# Patient Record
Sex: MALE | Race: BLACK OR AFRICAN AMERICAN | Employment: OTHER | URBAN - METROPOLITAN AREA
[De-identification: names, ages, dates, MRNs, and addresses within clinical notes are randomized per-mention and may not be internally consistent; named-entity substitution may affect disease eponyms.]

---

## 2017-04-11 ENCOUNTER — OP HISTORICAL/CONVERTED ENCOUNTER (OUTPATIENT)
Dept: OTHER | Age: 77
End: 2017-04-11

## 2020-01-01 ENCOUNTER — HOME CARE VISIT (OUTPATIENT)
Dept: HOSPICE | Facility: HOSPICE | Age: 80
End: 2020-01-01
Payer: MEDICARE

## 2020-01-01 ENCOUNTER — APPOINTMENT (OUTPATIENT)
Dept: GENERAL RADIOLOGY | Age: 80
DRG: 330 | End: 2020-01-01
Attending: COLON & RECTAL SURGERY
Payer: MEDICARE

## 2020-01-01 ENCOUNTER — APPOINTMENT (OUTPATIENT)
Dept: GENERAL RADIOLOGY | Age: 80
DRG: 330 | End: 2020-01-01
Attending: EMERGENCY MEDICINE
Payer: MEDICARE

## 2020-01-01 ENCOUNTER — ANESTHESIA EVENT (OUTPATIENT)
Dept: SURGERY | Age: 80
DRG: 330 | End: 2020-01-01
Payer: MEDICARE

## 2020-01-01 ENCOUNTER — HOSPITAL ENCOUNTER (INPATIENT)
Age: 80
LOS: 5 days | DRG: 375 | End: 2020-08-20
Attending: FAMILY MEDICINE | Admitting: FAMILY MEDICINE
Payer: OTHER MISCELLANEOUS

## 2020-01-01 ENCOUNTER — PATIENT OUTREACH (OUTPATIENT)
Dept: CASE MANAGEMENT | Age: 80
End: 2020-01-01

## 2020-01-01 ENCOUNTER — HOME CARE VISIT (OUTPATIENT)
Dept: SCHEDULING | Facility: HOME HEALTH | Age: 80
End: 2020-01-01
Payer: MEDICARE

## 2020-01-01 ENCOUNTER — HOSPITAL ENCOUNTER (INPATIENT)
Age: 80
LOS: 5 days | Discharge: HOSPICE | DRG: 951 | End: 2020-08-05
Attending: FAMILY MEDICINE | Admitting: FAMILY MEDICINE
Payer: OTHER MISCELLANEOUS

## 2020-01-01 ENCOUNTER — APPOINTMENT (OUTPATIENT)
Dept: GENERAL RADIOLOGY | Age: 80
DRG: 330 | End: 2020-01-01
Attending: FAMILY MEDICINE
Payer: MEDICARE

## 2020-01-01 ENCOUNTER — APPOINTMENT (OUTPATIENT)
Dept: GENERAL RADIOLOGY | Age: 80
DRG: 330 | End: 2020-01-01
Attending: ANESTHESIOLOGY
Payer: MEDICARE

## 2020-01-01 ENCOUNTER — HOSPITAL ENCOUNTER (OUTPATIENT)
Dept: REHABILITATION | Age: 80
End: 2020-05-28
Attending: PHYSICAL MEDICINE & REHABILITATION | Admitting: PHYSICAL MEDICINE & REHABILITATION

## 2020-01-01 ENCOUNTER — TELEPHONE (OUTPATIENT)
Dept: CARDIOLOGY CLINIC | Age: 80
End: 2020-01-01

## 2020-01-01 ENCOUNTER — ANESTHESIA (OUTPATIENT)
Dept: SURGERY | Age: 80
DRG: 330 | End: 2020-01-01
Payer: MEDICARE

## 2020-01-01 ENCOUNTER — ANESTHESIA EVENT (OUTPATIENT)
Dept: ENDOSCOPY | Age: 80
DRG: 330 | End: 2020-01-01
Payer: MEDICARE

## 2020-01-01 ENCOUNTER — HOSPITAL ENCOUNTER (EMERGENCY)
Age: 80
Discharge: HOME HEALTH | End: 2020-06-03
Attending: EMERGENCY MEDICINE | Admitting: EMERGENCY MEDICINE
Payer: MEDICARE

## 2020-01-01 ENCOUNTER — APPOINTMENT (OUTPATIENT)
Dept: GENERAL RADIOLOGY | Age: 80
DRG: 330 | End: 2020-01-01
Attending: INTERNAL MEDICINE
Payer: MEDICARE

## 2020-01-01 ENCOUNTER — APPOINTMENT (OUTPATIENT)
Dept: NON INVASIVE DIAGNOSTICS | Age: 80
DRG: 330 | End: 2020-01-01
Attending: INTERNAL MEDICINE
Payer: MEDICARE

## 2020-01-01 ENCOUNTER — HOSPICE ADMISSION (OUTPATIENT)
Dept: HOSPICE | Facility: HOSPICE | Age: 80
End: 2020-01-01
Payer: MEDICARE

## 2020-01-01 ENCOUNTER — APPOINTMENT (OUTPATIENT)
Dept: CT IMAGING | Age: 80
DRG: 330 | End: 2020-01-01
Attending: EMERGENCY MEDICINE
Payer: MEDICARE

## 2020-01-01 ENCOUNTER — APPOINTMENT (OUTPATIENT)
Dept: CT IMAGING | Age: 80
End: 2020-01-01
Attending: EMERGENCY MEDICINE
Payer: MEDICARE

## 2020-01-01 ENCOUNTER — APPOINTMENT (OUTPATIENT)
Dept: CT IMAGING | Age: 80
DRG: 330 | End: 2020-01-01
Attending: NURSE PRACTITIONER
Payer: MEDICARE

## 2020-01-01 ENCOUNTER — ANESTHESIA (OUTPATIENT)
Dept: ENDOSCOPY | Age: 80
DRG: 330 | End: 2020-01-01
Payer: MEDICARE

## 2020-01-01 ENCOUNTER — HOSPITAL ENCOUNTER (EMERGENCY)
Age: 80
Discharge: HOME | End: 2020-07-06
Attending: EMERGENCY MEDICINE | Admitting: EMERGENCY MEDICINE
Payer: MEDICARE

## 2020-01-01 ENCOUNTER — HOSPITAL ENCOUNTER (INPATIENT)
Age: 80
LOS: 13 days | Discharge: NURSING, REHAB OR LONG-TERM CARE | DRG: 330 | End: 2020-05-13
Attending: EMERGENCY MEDICINE | Admitting: FAMILY MEDICINE
Payer: MEDICARE

## 2020-01-01 VITALS
SYSTOLIC BLOOD PRESSURE: 160 MMHG | DIASTOLIC BLOOD PRESSURE: 100 MMHG | RESPIRATION RATE: 18 BRPM | OXYGEN SATURATION: 98 % | HEART RATE: 72 BPM

## 2020-01-01 VITALS
SYSTOLIC BLOOD PRESSURE: 148 MMHG | HEART RATE: 80 BPM | TEMPERATURE: 97.8 F | RESPIRATION RATE: 24 BRPM | DIASTOLIC BLOOD PRESSURE: 78 MMHG | OXYGEN SATURATION: 96 %

## 2020-01-01 VITALS
RESPIRATION RATE: 18 BRPM | DIASTOLIC BLOOD PRESSURE: 88 MMHG | SYSTOLIC BLOOD PRESSURE: 136 MMHG | TEMPERATURE: 99.3 F | HEART RATE: 72 BPM | OXYGEN SATURATION: 99 %

## 2020-01-01 VITALS
HEART RATE: 109 BPM | DIASTOLIC BLOOD PRESSURE: 39 MMHG | TEMPERATURE: 102.7 F | SYSTOLIC BLOOD PRESSURE: 56 MMHG | RESPIRATION RATE: 22 BRPM | OXYGEN SATURATION: 84 %

## 2020-01-01 VITALS
OXYGEN SATURATION: 99 % | HEART RATE: 64 BPM | DIASTOLIC BLOOD PRESSURE: 79 MMHG | BODY MASS INDEX: 28.79 KG/M2 | TEMPERATURE: 94.3 F | RESPIRATION RATE: 20 BRPM | SYSTOLIC BLOOD PRESSURE: 129 MMHG | WEIGHT: 190 LBS | HEIGHT: 68 IN

## 2020-01-01 VITALS
OXYGEN SATURATION: 96 % | HEART RATE: 79 BPM | SYSTOLIC BLOOD PRESSURE: 152 MMHG | TEMPERATURE: 97.9 F | RESPIRATION RATE: 22 BRPM | DIASTOLIC BLOOD PRESSURE: 80 MMHG

## 2020-01-01 VITALS
BODY MASS INDEX: 28.79 KG/M2 | TEMPERATURE: 99.4 F | SYSTOLIC BLOOD PRESSURE: 160 MMHG | WEIGHT: 190 LBS | RESPIRATION RATE: 18 BRPM | HEIGHT: 68 IN | OXYGEN SATURATION: 99 % | DIASTOLIC BLOOD PRESSURE: 98 MMHG | HEART RATE: 72 BPM

## 2020-01-01 VITALS
HEART RATE: 79 BPM | OXYGEN SATURATION: 99 % | SYSTOLIC BLOOD PRESSURE: 126 MMHG | TEMPERATURE: 98.8 F | WEIGHT: 190 LBS | RESPIRATION RATE: 16 BRPM | BODY MASS INDEX: 28.79 KG/M2 | DIASTOLIC BLOOD PRESSURE: 98 MMHG | HEIGHT: 68 IN

## 2020-01-01 VITALS
SYSTOLIC BLOOD PRESSURE: 128 MMHG | OXYGEN SATURATION: 98 % | HEIGHT: 68 IN | RESPIRATION RATE: 19 BRPM | DIASTOLIC BLOOD PRESSURE: 76 MMHG | BODY MASS INDEX: 27.89 KG/M2 | TEMPERATURE: 98.6 F | WEIGHT: 184 LBS | HEART RATE: 61 BPM

## 2020-01-01 VITALS
OXYGEN SATURATION: 98 % | SYSTOLIC BLOOD PRESSURE: 180 MMHG | DIASTOLIC BLOOD PRESSURE: 100 MMHG | HEART RATE: 82 BPM | TEMPERATURE: 96.2 F | RESPIRATION RATE: 18 BRPM

## 2020-01-01 VITALS — SYSTOLIC BLOOD PRESSURE: 162 MMHG | OXYGEN SATURATION: 98 % | DIASTOLIC BLOOD PRESSURE: 85 MMHG

## 2020-01-01 DIAGNOSIS — R52 GENERALIZED PAIN: ICD-10-CM

## 2020-01-01 DIAGNOSIS — D64.9 CHRONIC ANEMIA: ICD-10-CM

## 2020-01-01 DIAGNOSIS — N28.9 RENAL INSUFFICIENCY: ICD-10-CM

## 2020-01-01 DIAGNOSIS — E86.0 DEHYDRATION: ICD-10-CM

## 2020-01-01 DIAGNOSIS — M54.9 MID BACK PAIN: ICD-10-CM

## 2020-01-01 DIAGNOSIS — C20 RECTAL CANCER (HCC): ICD-10-CM
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
## 2020-01-01 DIAGNOSIS — R06.89 IRREGULAR BREATHING PATTERN: ICD-10-CM

## 2020-01-01 DIAGNOSIS — D72.829 LEUKOCYTOSIS, UNSPECIFIED TYPE: ICD-10-CM

## 2020-01-01 DIAGNOSIS — R06.82 TACHYPNEA: ICD-10-CM

## 2020-01-01 DIAGNOSIS — Z71.89 ADVANCED CARE PLANNING/COUNSELING DISCUSSION: ICD-10-CM

## 2020-01-01 DIAGNOSIS — Z51.5 HOSPICE CARE PATIENT: ICD-10-CM

## 2020-01-01 DIAGNOSIS — Z71.89 GOALS OF CARE, COUNSELING/DISCUSSION: ICD-10-CM

## 2020-01-01 DIAGNOSIS — F03.90 DEMENTIA WITHOUT BEHAVIORAL DISTURBANCE, UNSPECIFIED DEMENTIA TYPE: ICD-10-CM

## 2020-01-01 DIAGNOSIS — J39.8 INCREASED TRACHEAL SECRETIONS: ICD-10-CM

## 2020-01-01 DIAGNOSIS — K62.89 RECTAL MASS: ICD-10-CM

## 2020-01-01 DIAGNOSIS — R77.8 ELEVATED TROPONIN: ICD-10-CM

## 2020-01-01 DIAGNOSIS — N30.00 ACUTE CYSTITIS WITHOUT HEMATURIA: ICD-10-CM

## 2020-01-01 DIAGNOSIS — Z93.3 S/P COLOSTOMY (HCC): ICD-10-CM

## 2020-01-01 DIAGNOSIS — Z71.89 DNR (DO NOT RESUSCITATE) DISCUSSION: ICD-10-CM

## 2020-01-01 DIAGNOSIS — R06.4 LABORED BREATHING: ICD-10-CM

## 2020-01-01 DIAGNOSIS — K52.9 ENTERITIS: ICD-10-CM

## 2020-01-01 DIAGNOSIS — T83.9XXA FOLEY CATHETER PROBLEM, INITIAL ENCOUNTER (HCC): Primary | ICD-10-CM

## 2020-01-01 DIAGNOSIS — C20 RECTAL ADENOCARCINOMA (HCC): ICD-10-CM

## 2020-01-01 DIAGNOSIS — R41.89 DECREASED RESPONSIVENESS: ICD-10-CM

## 2020-01-01 DIAGNOSIS — N17.9 AKI (ACUTE KIDNEY INJURY) (HCC): Primary | ICD-10-CM

## 2020-01-01 LAB
25(OH)D3 SERPL-MCNC: 14.1 NG/ML (ref 30–100)
ABO + RH BLD: NORMAL
ALBUMIN SERPL-MCNC: 1.4 G/DL (ref 3.5–5)
ALBUMIN SERPL-MCNC: 1.5 G/DL (ref 3.5–5)
ALBUMIN SERPL-MCNC: 1.5 G/DL (ref 3.5–5)
ALBUMIN SERPL-MCNC: 1.6 G/DL (ref 3.5–5)
ALBUMIN SERPL-MCNC: 2 G/DL (ref 3.5–5)
ALBUMIN SERPL-MCNC: 2.5 G/DL (ref 3.5–5)
ALBUMIN SERPL-MCNC: 2.5 G/DL (ref 3.5–5)
ALBUMIN SERPL-MCNC: 2.6 G/DL (ref 3.5–5)
ALBUMIN SERPL-MCNC: 2.7 G/DL (ref 3.5–5)
ALBUMIN SERPL-MCNC: 2.8 G/DL (ref 3.5–5)
ALBUMIN SERPL-MCNC: 2.9 G/DL (ref 3.5–5)
ALBUMIN SERPL-MCNC: 2.9 G/DL (ref 3.5–5)
ALBUMIN/GLOB SERPL: 0.5 {RATIO} (ref 1.1–2.2)
ALBUMIN/GLOB SERPL: 0.6 {RATIO} (ref 1.1–2.2)
ALBUMIN/GLOB SERPL: 0.7 {RATIO} (ref 1.1–2.2)
ALBUMIN/GLOB SERPL: 0.7 {RATIO} (ref 1.1–2.2)
ALBUMIN/GLOB SERPL: 0.8 {RATIO} (ref 1.1–2.2)
ALP SERPL-CCNC: 37 U/L (ref 45–117)
ALP SERPL-CCNC: 40 U/L (ref 45–117)
ALP SERPL-CCNC: 43 U/L (ref 45–117)
ALP SERPL-CCNC: 50 U/L (ref 45–117)
ALP SERPL-CCNC: 56 U/L (ref 45–117)
ALP SERPL-CCNC: 63 U/L (ref 45–117)
ALP SERPL-CCNC: 66 U/L (ref 45–117)
ALP SERPL-CCNC: 68 U/L (ref 45–117)
ALP SERPL-CCNC: 69 U/L (ref 45–117)
ALT SERPL-CCNC: 10 U/L (ref 12–78)
ALT SERPL-CCNC: 12 U/L (ref 12–78)
ALT SERPL-CCNC: 13 U/L (ref 12–78)
ALT SERPL-CCNC: 16 U/L (ref 12–78)
ALT SERPL-CCNC: 18 U/L (ref 12–78)
ALT SERPL-CCNC: 30 U/L (ref 12–78)
ALT SERPL-CCNC: 7 U/L (ref 12–78)
ALT SERPL-CCNC: 9 U/L (ref 12–78)
ALT SERPL-CCNC: 9 U/L (ref 12–78)
ANION GAP SERPL CALC-SCNC: 10 MMOL/L (ref 5–15)
ANION GAP SERPL CALC-SCNC: 10 MMOL/L (ref 5–15)
ANION GAP SERPL CALC-SCNC: 11 MMOL/L (ref 5–15)
ANION GAP SERPL CALC-SCNC: 4 MMOL/L (ref 5–15)
ANION GAP SERPL CALC-SCNC: 5 MMOL/L (ref 5–15)
ANION GAP SERPL CALC-SCNC: 6 MMOL/L (ref 5–15)
ANION GAP SERPL CALC-SCNC: 7 MMOL/L (ref 5–15)
ANION GAP SERPL CALC-SCNC: 8 MMOL/L (ref 5–15)
ANION GAP SERPL CALC-SCNC: 9 MMOL/L (ref 5–15)
ANION GAP SERPL CALC-SCNC: 9 MMOL/L (ref 5–15)
APPEARANCE UR: ABNORMAL
APPEARANCE UR: ABNORMAL
AST SERPL-CCNC: 11 U/L (ref 15–37)
AST SERPL-CCNC: 12 U/L (ref 15–37)
AST SERPL-CCNC: 13 U/L (ref 15–37)
AST SERPL-CCNC: 13 U/L (ref 15–37)
AST SERPL-CCNC: 14 U/L (ref 15–37)
AST SERPL-CCNC: 14 U/L (ref 15–37)
AST SERPL-CCNC: 17 U/L (ref 15–37)
AST SERPL-CCNC: 18 U/L (ref 15–37)
AST SERPL-CCNC: 26 U/L (ref 15–37)
ATRIAL RATE: 82 BPM
ATRIAL RATE: 94 BPM
AV PEAK GRADIENT: 33.44 MMHG
AV VELOCITY RATIO: 0.94
BACTERIA SPEC CULT: ABNORMAL
BACTERIA SPEC CULT: ABNORMAL
BACTERIA SPEC CULT: NORMAL
BACTERIA URNS QL MICRO: ABNORMAL /HPF
BACTERIA URNS QL MICRO: ABNORMAL /HPF
BASOPHILS # BLD: 0 K/UL (ref 0–0.1)
BASOPHILS NFR BLD: 0 % (ref 0–1)
BILIRUB SERPL-MCNC: 0.2 MG/DL (ref 0.2–1)
BILIRUB SERPL-MCNC: 0.2 MG/DL (ref 0.2–1)
BILIRUB SERPL-MCNC: 0.3 MG/DL (ref 0.2–1)
BILIRUB SERPL-MCNC: 0.4 MG/DL (ref 0.2–1)
BILIRUB SERPL-MCNC: 0.5 MG/DL (ref 0.2–1)
BILIRUB SERPL-MCNC: 0.6 MG/DL (ref 0.2–1)
BILIRUB SERPL-MCNC: 0.8 MG/DL (ref 0.2–1)
BILIRUB SERPL-MCNC: 0.9 MG/DL (ref 0.2–1)
BILIRUB SERPL-MCNC: 1 MG/DL (ref 0.2–1)
BILIRUB UR QL CFM: NEGATIVE
BILIRUB UR QL: NEGATIVE
BLOOD GROUP ANTIBODIES SERPL: NORMAL
BUN SERPL-MCNC: 16 MG/DL (ref 6–20)
BUN SERPL-MCNC: 17 MG/DL (ref 6–20)
BUN SERPL-MCNC: 17 MG/DL (ref 6–20)
BUN SERPL-MCNC: 18 MG/DL (ref 6–20)
BUN SERPL-MCNC: 19 MG/DL (ref 6–20)
BUN SERPL-MCNC: 19 MG/DL (ref 6–20)
BUN SERPL-MCNC: 20 MG/DL (ref 6–20)
BUN SERPL-MCNC: 21 MG/DL (ref 6–20)
BUN SERPL-MCNC: 21 MG/DL (ref 6–20)
BUN SERPL-MCNC: 22 MG/DL (ref 6–20)
BUN SERPL-MCNC: 24 MG/DL (ref 6–20)
BUN SERPL-MCNC: 24 MG/DL (ref 6–20)
BUN SERPL-MCNC: 26 MG/DL (ref 6–20)
BUN SERPL-MCNC: 31 MG/DL (ref 6–20)
BUN SERPL-MCNC: 32 MG/DL (ref 6–20)
BUN SERPL-MCNC: 33 MG/DL (ref 6–20)
BUN SERPL-MCNC: 34 MG/DL (ref 6–20)
BUN SERPL-MCNC: 34 MG/DL (ref 6–20)
BUN/CREAT SERPL: 13 (ref 12–20)
BUN/CREAT SERPL: 13 (ref 12–20)
BUN/CREAT SERPL: 15 (ref 12–20)
BUN/CREAT SERPL: 16 (ref 12–20)
BUN/CREAT SERPL: 16 (ref 12–20)
BUN/CREAT SERPL: 17 (ref 12–20)
BUN/CREAT SERPL: 18 (ref 12–20)
BUN/CREAT SERPL: 20 (ref 12–20)
BUN/CREAT SERPL: 20 (ref 12–20)
BUN/CREAT SERPL: 21 (ref 12–20)
BUN/CREAT SERPL: 22 (ref 12–20)
BUN/CREAT SERPL: 26 (ref 12–20)
CALCIUM SERPL-MCNC: 6.6 MG/DL (ref 8.5–10.1)
CALCIUM SERPL-MCNC: 6.7 MG/DL (ref 8.5–10.1)
CALCIUM SERPL-MCNC: 6.8 MG/DL (ref 8.5–10.1)
CALCIUM SERPL-MCNC: 6.9 MG/DL (ref 8.5–10.1)
CALCIUM SERPL-MCNC: 7 MG/DL (ref 8.5–10.1)
CALCIUM SERPL-MCNC: 7 MG/DL (ref 8.5–10.1)
CALCIUM SERPL-MCNC: 7.3 MG/DL (ref 8.5–10.1)
CALCIUM SERPL-MCNC: 7.5 MG/DL (ref 8.5–10.1)
CALCIUM SERPL-MCNC: 7.6 MG/DL (ref 8.5–10.1)
CALCIUM SERPL-MCNC: 7.7 MG/DL (ref 8.5–10.1)
CALCIUM SERPL-MCNC: 7.8 MG/DL (ref 8.5–10.1)
CALCIUM SERPL-MCNC: 7.9 MG/DL (ref 8.5–10.1)
CALCIUM SERPL-MCNC: 8 MG/DL (ref 8.5–10.1)
CALCIUM SERPL-MCNC: 8 MG/DL (ref 8.5–10.1)
CALCIUM SERPL-MCNC: 8.1 MG/DL (ref 8.5–10.1)
CALCIUM SERPL-MCNC: 8.1 MG/DL (ref 8.5–10.1)
CALCIUM SERPL-MCNC: 8.5 MG/DL (ref 8.5–10.1)
CALCIUM SERPL-MCNC: 8.6 MG/DL (ref 8.5–10.1)
CALCIUM SERPL-MCNC: 8.6 MG/DL (ref 8.5–10.1)
CALCIUM SERPL-MCNC: 8.7 MG/DL (ref 8.5–10.1)
CALCIUM SERPL-MCNC: 8.8 MG/DL (ref 8.5–10.1)
CALCULATED P AXIS, ECG09: 33 DEGREES
CALCULATED P AXIS, ECG09: 45 DEGREES
CALCULATED R AXIS, ECG10: -19 DEGREES
CALCULATED R AXIS, ECG10: 2 DEGREES
CALCULATED T AXIS, ECG11: 60 DEGREES
CALCULATED T AXIS, ECG11: 91 DEGREES
CC UR VC: ABNORMAL
CC UR VC: ABNORMAL
CEA SERPL-MCNC: 2.9 NG/ML
CHLORIDE SERPL-SCNC: 107 MMOL/L (ref 97–108)
CHLORIDE SERPL-SCNC: 110 MMOL/L (ref 97–108)
CHLORIDE SERPL-SCNC: 111 MMOL/L (ref 97–108)
CHLORIDE SERPL-SCNC: 112 MMOL/L (ref 97–108)
CHLORIDE SERPL-SCNC: 113 MMOL/L (ref 97–108)
CHLORIDE SERPL-SCNC: 114 MMOL/L (ref 97–108)
CHLORIDE SERPL-SCNC: 115 MMOL/L (ref 97–108)
CHLORIDE SERPL-SCNC: 116 MMOL/L (ref 97–108)
CHLORIDE SERPL-SCNC: 116 MMOL/L (ref 97–108)
CHLORIDE SERPL-SCNC: 117 MMOL/L (ref 97–108)
CHLORIDE SERPL-SCNC: 117 MMOL/L (ref 97–108)
CHLORIDE SERPL-SCNC: 118 MMOL/L (ref 97–108)
CHOLEST SERPL-MCNC: <50 MG/DL
CO2 SERPL-SCNC: 17 MMOL/L (ref 21–32)
CO2 SERPL-SCNC: 18 MMOL/L (ref 21–32)
CO2 SERPL-SCNC: 18 MMOL/L (ref 21–32)
CO2 SERPL-SCNC: 19 MMOL/L (ref 21–32)
CO2 SERPL-SCNC: 20 MMOL/L (ref 21–32)
CO2 SERPL-SCNC: 21 MMOL/L (ref 21–32)
CO2 SERPL-SCNC: 21 MMOL/L (ref 21–32)
CO2 SERPL-SCNC: 22 MMOL/L (ref 21–32)
CO2 SERPL-SCNC: 23 MMOL/L (ref 21–32)
CO2 SERPL-SCNC: 25 MMOL/L (ref 21–32)
CO2 SERPL-SCNC: 26 MMOL/L (ref 21–32)
COLOR UR: ABNORMAL
COLOR UR: ABNORMAL
COMMENT, HOLDF: NORMAL
CREAT SERPL-MCNC: 1.01 MG/DL (ref 0.7–1.3)
CREAT SERPL-MCNC: 1.01 MG/DL (ref 0.7–1.3)
CREAT SERPL-MCNC: 1.05 MG/DL (ref 0.7–1.3)
CREAT SERPL-MCNC: 1.12 MG/DL (ref 0.7–1.3)
CREAT SERPL-MCNC: 1.13 MG/DL (ref 0.7–1.3)
CREAT SERPL-MCNC: 1.15 MG/DL (ref 0.7–1.3)
CREAT SERPL-MCNC: 1.16 MG/DL (ref 0.7–1.3)
CREAT SERPL-MCNC: 1.18 MG/DL (ref 0.7–1.3)
CREAT SERPL-MCNC: 1.18 MG/DL (ref 0.7–1.3)
CREAT SERPL-MCNC: 1.19 MG/DL (ref 0.7–1.3)
CREAT SERPL-MCNC: 1.23 MG/DL (ref 0.7–1.3)
CREAT SERPL-MCNC: 1.23 MG/DL (ref 0.7–1.3)
CREAT SERPL-MCNC: 1.28 MG/DL (ref 0.7–1.3)
CREAT SERPL-MCNC: 1.31 MG/DL (ref 0.7–1.3)
CREAT SERPL-MCNC: 1.32 MG/DL (ref 0.7–1.3)
CREAT SERPL-MCNC: 1.33 MG/DL (ref 0.7–1.3)
CREAT SERPL-MCNC: 1.41 MG/DL (ref 0.7–1.3)
CREAT SERPL-MCNC: 1.46 MG/DL (ref 0.7–1.3)
CREAT SERPL-MCNC: 1.56 MG/DL (ref 0.7–1.3)
CREAT SERPL-MCNC: 1.64 MG/DL (ref 0.7–1.3)
CREAT SERPL-MCNC: 1.72 MG/DL (ref 0.7–1.3)
CREAT SERPL-MCNC: 1.83 MG/DL (ref 0.7–1.3)
CREAT SERPL-MCNC: 2.24 MG/DL (ref 0.7–1.3)
CREAT UR-MCNC: 280 MG/DL
DIAGNOSIS, 93000: NORMAL
DIAGNOSIS, 93000: NORMAL
DIFFERENTIAL METHOD BLD: ABNORMAL
ECHO AO ROOT DIAM: 3.69 CM
ECHO AV AREA PEAK VELOCITY: 3 CM2
ECHO AV PEAK GRADIENT: 8 MMHG
ECHO AV PEAK VELOCITY: 141 CM/S
ECHO AV REGURGITANT PHT: 754.9 CM
ECHO LA MAJOR AXIS: 2.97 CM
ECHO LA TO AORTIC ROOT RATIO: 0.8
ECHO LA VOL 2C: 56.97 ML (ref 18–58)
ECHO LA VOL 4C: 62.38 ML (ref 18–58)
ECHO LV INTERNAL DIMENSION DIASTOLIC: 3.1 CM (ref 4.2–5.9)
ECHO LV INTERNAL DIMENSION SYSTOLIC: 2.26 CM
ECHO LV IVSD: 1.28 CM (ref 0.6–1)
ECHO LV MASS 2D: 125.7 G (ref 88–224)
ECHO LV POSTERIOR WALL DIASTOLIC: 1.09 CM (ref 0.6–1)
ECHO LVOT DIAM: 2.01 CM
ECHO LVOT PEAK GRADIENT: 7.1 MMHG
ECHO LVOT PEAK VELOCITY: 133.03 CM/S
ECHO MV A VELOCITY: 74.25 CM/S
ECHO MV E DECELERATION TIME (DT): 259.4 MS
ECHO MV E VELOCITY: 29.84 CM/S
ECHO MV E/A RATIO: 0.4
ECHO MV REGURGITANT PEAK GRADIENT: 26.3 MMHG
ECHO MV REGURGITANT PEAK VELOCITY: 256.57 CM/S
ECHO PV MAX VELOCITY: 113.73 CM/S
ECHO PV PEAK GRADIENT: 5.2 MMHG
ECHO RV INTERNAL DIMENSION: 3.23 CM
ECHO TV REGURGITANT MAX VELOCITY: 294.15 CM/S
ECHO TV REGURGITANT PEAK GRADIENT: 34.6 MMHG
EOSINOPHIL # BLD: 0 K/UL (ref 0–0.4)
EOSINOPHIL # BLD: 0.1 K/UL (ref 0–0.4)
EOSINOPHIL #/AREA URNS HPF: NEGATIVE /[HPF]
EOSINOPHIL NFR BLD: 0 % (ref 0–7)
EOSINOPHIL NFR BLD: 1 % (ref 0–7)
EPITH CASTS URNS QL MICRO: ABNORMAL /LPF
EPITH CASTS URNS QL MICRO: ABNORMAL /LPF
ERYTHROCYTE [DISTWIDTH] IN BLOOD BY AUTOMATED COUNT: 14.1 % (ref 11.5–14.5)
ERYTHROCYTE [DISTWIDTH] IN BLOOD BY AUTOMATED COUNT: 14.5 % (ref 11.5–14.5)
ERYTHROCYTE [DISTWIDTH] IN BLOOD BY AUTOMATED COUNT: 14.6 % (ref 11.5–14.5)
ERYTHROCYTE [DISTWIDTH] IN BLOOD BY AUTOMATED COUNT: 14.6 % (ref 11.5–14.5)
ERYTHROCYTE [DISTWIDTH] IN BLOOD BY AUTOMATED COUNT: 14.8 % (ref 11.5–14.5)
ERYTHROCYTE [DISTWIDTH] IN BLOOD BY AUTOMATED COUNT: 14.8 % (ref 11.5–14.5)
ERYTHROCYTE [DISTWIDTH] IN BLOOD BY AUTOMATED COUNT: 14.9 % (ref 11.5–14.5)
ERYTHROCYTE [DISTWIDTH] IN BLOOD BY AUTOMATED COUNT: 15.3 % (ref 11.5–14.5)
ERYTHROCYTE [DISTWIDTH] IN BLOOD BY AUTOMATED COUNT: 15.4 % (ref 11.5–14.5)
ERYTHROCYTE [DISTWIDTH] IN BLOOD BY AUTOMATED COUNT: 15.4 % (ref 11.5–14.5)
ERYTHROCYTE [DISTWIDTH] IN BLOOD BY AUTOMATED COUNT: 15.5 % (ref 11.5–14.5)
ERYTHROCYTE [DISTWIDTH] IN BLOOD BY AUTOMATED COUNT: 15.5 % (ref 11.5–14.5)
ERYTHROCYTE [DISTWIDTH] IN BLOOD BY AUTOMATED COUNT: 15.6 % (ref 11.5–14.5)
ERYTHROCYTE [DISTWIDTH] IN BLOOD BY AUTOMATED COUNT: 15.6 % (ref 11.5–14.5)
ERYTHROCYTE [DISTWIDTH] IN BLOOD BY AUTOMATED COUNT: 15.8 % (ref 11.5–14.5)
ERYTHROCYTE [DISTWIDTH] IN BLOOD BY AUTOMATED COUNT: 16 % (ref 11.5–14.5)
ERYTHROCYTE [DISTWIDTH] IN BLOOD BY AUTOMATED COUNT: 16 % (ref 11.5–14.5)
GLOBULIN SER CALC-MCNC: 2.5 G/DL (ref 2–4)
GLOBULIN SER CALC-MCNC: 3 G/DL (ref 2–4)
GLOBULIN SER CALC-MCNC: 3.1 G/DL (ref 2–4)
GLOBULIN SER CALC-MCNC: 3.3 G/DL (ref 2–4)
GLOBULIN SER CALC-MCNC: 3.3 G/DL (ref 2–4)
GLOBULIN SER CALC-MCNC: 3.5 G/DL (ref 2–4)
GLOBULIN SER CALC-MCNC: 3.8 G/DL (ref 2–4)
GLOBULIN SER CALC-MCNC: 4 G/DL (ref 2–4)
GLOBULIN SER CALC-MCNC: 4.3 G/DL (ref 2–4)
GLUCOSE BLD STRIP.AUTO-MCNC: 100 MG/DL (ref 65–100)
GLUCOSE BLD STRIP.AUTO-MCNC: 101 MG/DL (ref 65–100)
GLUCOSE BLD STRIP.AUTO-MCNC: 102 MG/DL (ref 65–100)
GLUCOSE BLD STRIP.AUTO-MCNC: 102 MG/DL (ref 65–100)
GLUCOSE BLD STRIP.AUTO-MCNC: 104 MG/DL (ref 65–100)
GLUCOSE BLD STRIP.AUTO-MCNC: 104 MG/DL (ref 65–100)
GLUCOSE BLD STRIP.AUTO-MCNC: 108 MG/DL (ref 65–100)
GLUCOSE BLD STRIP.AUTO-MCNC: 111 MG/DL (ref 65–100)
GLUCOSE BLD STRIP.AUTO-MCNC: 112 MG/DL (ref 65–100)
GLUCOSE BLD STRIP.AUTO-MCNC: 113 MG/DL (ref 65–100)
GLUCOSE BLD STRIP.AUTO-MCNC: 115 MG/DL (ref 65–100)
GLUCOSE BLD STRIP.AUTO-MCNC: 117 MG/DL (ref 65–100)
GLUCOSE BLD STRIP.AUTO-MCNC: 119 MG/DL (ref 65–100)
GLUCOSE BLD STRIP.AUTO-MCNC: 120 MG/DL (ref 65–100)
GLUCOSE BLD STRIP.AUTO-MCNC: 128 MG/DL (ref 65–100)
GLUCOSE BLD STRIP.AUTO-MCNC: 151 MG/DL (ref 65–100)
GLUCOSE BLD STRIP.AUTO-MCNC: 193 MG/DL (ref 65–100)
GLUCOSE BLD STRIP.AUTO-MCNC: 80 MG/DL (ref 65–100)
GLUCOSE BLD STRIP.AUTO-MCNC: 82 MG/DL (ref 65–100)
GLUCOSE BLD STRIP.AUTO-MCNC: 96 MG/DL (ref 65–100)
GLUCOSE BLD STRIP.AUTO-MCNC: 97 MG/DL (ref 65–100)
GLUCOSE BLD STRIP.AUTO-MCNC: 98 MG/DL (ref 65–100)
GLUCOSE BLD STRIP.AUTO-MCNC: 98 MG/DL (ref 65–100)
GLUCOSE BLD STRIP.AUTO-MCNC: 99 MG/DL (ref 65–100)
GLUCOSE SERPL-MCNC: 101 MG/DL (ref 65–100)
GLUCOSE SERPL-MCNC: 106 MG/DL (ref 65–100)
GLUCOSE SERPL-MCNC: 106 MG/DL (ref 65–100)
GLUCOSE SERPL-MCNC: 109 MG/DL (ref 65–100)
GLUCOSE SERPL-MCNC: 110 MG/DL (ref 65–100)
GLUCOSE SERPL-MCNC: 112 MG/DL (ref 65–100)
GLUCOSE SERPL-MCNC: 113 MG/DL (ref 65–100)
GLUCOSE SERPL-MCNC: 114 MG/DL (ref 65–100)
GLUCOSE SERPL-MCNC: 114 MG/DL (ref 65–100)
GLUCOSE SERPL-MCNC: 119 MG/DL (ref 65–100)
GLUCOSE SERPL-MCNC: 129 MG/DL (ref 65–100)
GLUCOSE SERPL-MCNC: 133 MG/DL (ref 65–100)
GLUCOSE SERPL-MCNC: 68 MG/DL (ref 65–100)
GLUCOSE SERPL-MCNC: 69 MG/DL (ref 65–100)
GLUCOSE SERPL-MCNC: 728 MG/DL (ref 65–100)
GLUCOSE SERPL-MCNC: 80 MG/DL (ref 65–100)
GLUCOSE SERPL-MCNC: 87 MG/DL (ref 65–100)
GLUCOSE SERPL-MCNC: 88 MG/DL (ref 65–100)
GLUCOSE SERPL-MCNC: 88 MG/DL (ref 65–100)
GLUCOSE SERPL-MCNC: 90 MG/DL (ref 65–100)
GLUCOSE SERPL-MCNC: 91 MG/DL (ref 65–100)
GLUCOSE SERPL-MCNC: 95 MG/DL (ref 65–100)
GLUCOSE SERPL-MCNC: 97 MG/DL (ref 65–100)
GLUCOSE UR STRIP.AUTO-MCNC: NEGATIVE MG/DL
GLUCOSE UR STRIP.AUTO-MCNC: NEGATIVE MG/DL
GRAN CASTS URNS QL MICRO: ABNORMAL /LPF
HCT VFR BLD AUTO: 30.3 % (ref 36.6–50.3)
HCT VFR BLD AUTO: 30.9 % (ref 36.6–50.3)
HCT VFR BLD AUTO: 31.9 % (ref 36.6–50.3)
HCT VFR BLD AUTO: 31.9 % (ref 36.6–50.3)
HCT VFR BLD AUTO: 32.1 % (ref 36.6–50.3)
HCT VFR BLD AUTO: 33.5 % (ref 36.6–50.3)
HCT VFR BLD AUTO: 34.1 % (ref 36.6–50.3)
HCT VFR BLD AUTO: 34.4 % (ref 36.6–50.3)
HCT VFR BLD AUTO: 35.1 % (ref 36.6–50.3)
HCT VFR BLD AUTO: 35.9 % (ref 36.6–50.3)
HCT VFR BLD AUTO: 37.2 % (ref 36.6–50.3)
HCT VFR BLD AUTO: 39.1 % (ref 36.6–50.3)
HCT VFR BLD AUTO: 40.7 % (ref 36.6–50.3)
HCT VFR BLD AUTO: 41.7 % (ref 36.6–50.3)
HCT VFR BLD AUTO: 42.6 % (ref 36.6–50.3)
HCT VFR BLD AUTO: 44.2 % (ref 36.6–50.3)
HCT VFR BLD AUTO: 46.9 % (ref 36.6–50.3)
HDLC SERPL-MCNC: 20 MG/DL
HDLC SERPL: NORMAL {RATIO} (ref 0–5)
HGB BLD-MCNC: 10.2 G/DL (ref 12.1–17)
HGB BLD-MCNC: 10.3 G/DL (ref 12.1–17)
HGB BLD-MCNC: 10.5 G/DL (ref 12.1–17)
HGB BLD-MCNC: 10.7 G/DL (ref 12.1–17)
HGB BLD-MCNC: 10.8 G/DL (ref 12.1–17)
HGB BLD-MCNC: 10.9 G/DL (ref 12.1–17)
HGB BLD-MCNC: 11.3 G/DL (ref 12.1–17)
HGB BLD-MCNC: 11.8 G/DL (ref 12.1–17)
HGB BLD-MCNC: 11.9 G/DL (ref 12.1–17)
HGB BLD-MCNC: 12.7 G/DL (ref 12.1–17)
HGB BLD-MCNC: 13.1 G/DL (ref 12.1–17)
HGB BLD-MCNC: 13.6 G/DL (ref 12.1–17)
HGB BLD-MCNC: 14 G/DL (ref 12.1–17)
HGB BLD-MCNC: 14.3 G/DL (ref 12.1–17)
HGB BLD-MCNC: 15.5 G/DL (ref 12.1–17)
HGB UR QL STRIP: ABNORMAL
HGB UR QL STRIP: ABNORMAL
IMM GRANULOCYTES # BLD AUTO: 0 K/UL
IMM GRANULOCYTES # BLD AUTO: 0.1 K/UL (ref 0–0.04)
IMM GRANULOCYTES # BLD AUTO: 0.1 K/UL (ref 0–0.04)
IMM GRANULOCYTES # BLD AUTO: 0.2 K/UL (ref 0–0.04)
IMM GRANULOCYTES NFR BLD AUTO: 0 %
IMM GRANULOCYTES NFR BLD AUTO: 1 % (ref 0–0.5)
IMM GRANULOCYTES NFR BLD AUTO: 2 % (ref 0–0.5)
IMM GRANULOCYTES NFR BLD AUTO: 2 % (ref 0–0.5)
KETONES UR QL STRIP.AUTO: ABNORMAL MG/DL
KETONES UR QL STRIP.AUTO: NEGATIVE MG/DL
LACTATE SERPL-SCNC: 1.1 MMOL/L (ref 0.4–2)
LACTATE SERPL-SCNC: 1.5 MMOL/L (ref 0.4–2)
LDLC SERPL CALC-MCNC: NORMAL MG/DL (ref 0–100)
LEUKOCYTE ESTERASE UR QL STRIP.AUTO: ABNORMAL
LEUKOCYTE ESTERASE UR QL STRIP.AUTO: ABNORMAL
LIPASE SERPL-CCNC: 156 U/L (ref 73–393)
LIPID PROFILE,FLP: NORMAL
LVFS 2D: 27.11 %
LYMPHOCYTES # BLD: 0.5 K/UL (ref 0.8–3.5)
LYMPHOCYTES # BLD: 0.6 K/UL (ref 0.8–3.5)
LYMPHOCYTES # BLD: 0.7 K/UL (ref 0.8–3.5)
LYMPHOCYTES # BLD: 0.7 K/UL (ref 0.8–3.5)
LYMPHOCYTES # BLD: 0.9 K/UL (ref 0.8–3.5)
LYMPHOCYTES # BLD: 1.4 K/UL (ref 0.8–3.5)
LYMPHOCYTES # BLD: 1.5 K/UL (ref 0.8–3.5)
LYMPHOCYTES # BLD: 1.8 K/UL (ref 0.8–3.5)
LYMPHOCYTES # BLD: 2 K/UL (ref 0.8–3.5)
LYMPHOCYTES NFR BLD: 10 % (ref 12–49)
LYMPHOCYTES NFR BLD: 11 % (ref 12–49)
LYMPHOCYTES NFR BLD: 12 % (ref 12–49)
LYMPHOCYTES NFR BLD: 13 % (ref 12–49)
LYMPHOCYTES NFR BLD: 3 % (ref 12–49)
LYMPHOCYTES NFR BLD: 6 % (ref 12–49)
LYMPHOCYTES NFR BLD: 7 % (ref 12–49)
LYMPHOCYTES NFR BLD: 8 % (ref 12–49)
MAGNESIUM SERPL-MCNC: 1.3 MG/DL (ref 1.6–2.4)
MAGNESIUM SERPL-MCNC: 1.6 MG/DL (ref 1.6–2.4)
MAGNESIUM SERPL-MCNC: 1.6 MG/DL (ref 1.6–2.4)
MAGNESIUM SERPL-MCNC: 1.7 MG/DL (ref 1.6–2.4)
MAGNESIUM SERPL-MCNC: 1.7 MG/DL (ref 1.6–2.4)
MAGNESIUM SERPL-MCNC: 1.8 MG/DL (ref 1.6–2.4)
MAGNESIUM SERPL-MCNC: 1.9 MG/DL (ref 1.6–2.4)
MAGNESIUM SERPL-MCNC: 2 MG/DL (ref 1.6–2.4)
MAGNESIUM SERPL-MCNC: 2.1 MG/DL (ref 1.6–2.4)
MAGNESIUM SERPL-MCNC: 2.1 MG/DL (ref 1.6–2.4)
MCH RBC QN AUTO: 29.7 PG (ref 26–34)
MCH RBC QN AUTO: 29.8 PG (ref 26–34)
MCH RBC QN AUTO: 29.9 PG (ref 26–34)
MCH RBC QN AUTO: 30 PG (ref 26–34)
MCH RBC QN AUTO: 30.1 PG (ref 26–34)
MCH RBC QN AUTO: 30.1 PG (ref 26–34)
MCH RBC QN AUTO: 30.2 PG (ref 26–34)
MCH RBC QN AUTO: 30.2 PG (ref 26–34)
MCH RBC QN AUTO: 30.3 PG (ref 26–34)
MCH RBC QN AUTO: 30.3 PG (ref 26–34)
MCH RBC QN AUTO: 30.4 PG (ref 26–34)
MCH RBC QN AUTO: 30.4 PG (ref 26–34)
MCH RBC QN AUTO: 30.5 PG (ref 26–34)
MCHC RBC AUTO-ENTMCNC: 31.3 G/DL (ref 30–36.5)
MCHC RBC AUTO-ENTMCNC: 31.7 G/DL (ref 30–36.5)
MCHC RBC AUTO-ENTMCNC: 32.2 G/DL (ref 30–36.5)
MCHC RBC AUTO-ENTMCNC: 32.2 G/DL (ref 30–36.5)
MCHC RBC AUTO-ENTMCNC: 32.3 G/DL (ref 30–36.5)
MCHC RBC AUTO-ENTMCNC: 32.4 G/DL (ref 30–36.5)
MCHC RBC AUTO-ENTMCNC: 32.5 G/DL (ref 30–36.5)
MCHC RBC AUTO-ENTMCNC: 32.6 G/DL (ref 30–36.5)
MCHC RBC AUTO-ENTMCNC: 32.7 G/DL (ref 30–36.5)
MCHC RBC AUTO-ENTMCNC: 32.9 G/DL (ref 30–36.5)
MCHC RBC AUTO-ENTMCNC: 33 G/DL (ref 30–36.5)
MCHC RBC AUTO-ENTMCNC: 33.1 G/DL (ref 30–36.5)
MCHC RBC AUTO-ENTMCNC: 33.5 G/DL (ref 30–36.5)
MCHC RBC AUTO-ENTMCNC: 33.7 G/DL (ref 30–36.5)
MCHC RBC AUTO-ENTMCNC: 34 G/DL (ref 30–36.5)
MCV RBC AUTO: 89.6 FL (ref 80–99)
MCV RBC AUTO: 89.6 FL (ref 80–99)
MCV RBC AUTO: 90.1 FL (ref 80–99)
MCV RBC AUTO: 91 FL (ref 80–99)
MCV RBC AUTO: 91.2 FL (ref 80–99)
MCV RBC AUTO: 91.3 FL (ref 80–99)
MCV RBC AUTO: 91.4 FL (ref 80–99)
MCV RBC AUTO: 92 FL (ref 80–99)
MCV RBC AUTO: 92.2 FL (ref 80–99)
MCV RBC AUTO: 92.9 FL (ref 80–99)
MCV RBC AUTO: 93.3 FL (ref 80–99)
MCV RBC AUTO: 93.6 FL (ref 80–99)
MCV RBC AUTO: 93.7 FL (ref 80–99)
MCV RBC AUTO: 94.2 FL (ref 80–99)
MCV RBC AUTO: 94.4 FL (ref 80–99)
MCV RBC AUTO: 95.2 FL (ref 80–99)
MCV RBC AUTO: 95.5 FL (ref 80–99)
METAMYELOCYTES NFR BLD MANUAL: 2 %
MONOCYTES # BLD: 0.4 K/UL (ref 0–1)
MONOCYTES # BLD: 0.5 K/UL (ref 0–1)
MONOCYTES # BLD: 0.6 K/UL (ref 0–1)
MONOCYTES # BLD: 0.6 K/UL (ref 0–1)
MONOCYTES # BLD: 0.7 K/UL (ref 0–1)
MONOCYTES # BLD: 0.8 K/UL (ref 0–1)
MONOCYTES NFR BLD: 3 % (ref 5–13)
MONOCYTES NFR BLD: 3 % (ref 5–13)
MONOCYTES NFR BLD: 4 % (ref 5–13)
MONOCYTES NFR BLD: 5 % (ref 5–13)
MONOCYTES NFR BLD: 6 % (ref 5–13)
MONOCYTES NFR BLD: 7 % (ref 5–13)
MONOCYTES NFR BLD: 7 % (ref 5–13)
MV DEC SLOPE: 1.15
NEUTS BAND NFR BLD MANUAL: 1 % (ref 0–6)
NEUTS BAND NFR BLD MANUAL: 4 % (ref 0–6)
NEUTS BAND NFR BLD MANUAL: 5 % (ref 0–6)
NEUTS BAND NFR BLD MANUAL: 6 % (ref 0–6)
NEUTS SEG # BLD: 10.1 K/UL (ref 1.8–8)
NEUTS SEG # BLD: 10.2 K/UL (ref 1.8–8)
NEUTS SEG # BLD: 10.9 K/UL (ref 1.8–8)
NEUTS SEG # BLD: 10.9 K/UL (ref 1.8–8)
NEUTS SEG # BLD: 13 K/UL (ref 1.8–8)
NEUTS SEG # BLD: 14.3 K/UL (ref 1.8–8)
NEUTS SEG # BLD: 15.6 K/UL (ref 1.8–8)
NEUTS SEG # BLD: 7.2 K/UL (ref 1.8–8)
NEUTS SEG # BLD: 8.6 K/UL (ref 1.8–8)
NEUTS SEG # BLD: 9.6 K/UL (ref 1.8–8)
NEUTS SEG # BLD: 9.6 K/UL (ref 1.8–8)
NEUTS SEG NFR BLD: 77 % (ref 32–75)
NEUTS SEG NFR BLD: 79 % (ref 32–75)
NEUTS SEG NFR BLD: 80 % (ref 32–75)
NEUTS SEG NFR BLD: 82 % (ref 32–75)
NEUTS SEG NFR BLD: 83 % (ref 32–75)
NEUTS SEG NFR BLD: 85 % (ref 32–75)
NEUTS SEG NFR BLD: 85 % (ref 32–75)
NEUTS SEG NFR BLD: 86 % (ref 32–75)
NEUTS SEG NFR BLD: 89 % (ref 32–75)
NEUTS SEG NFR BLD: 89 % (ref 32–75)
NEUTS SEG NFR BLD: 93 % (ref 32–75)
NITRITE UR QL STRIP.AUTO: NEGATIVE
NITRITE UR QL STRIP.AUTO: POSITIVE
NRBC # BLD: 0 K/UL (ref 0–0.01)
NRBC BLD-RTO: 0 PER 100 WBC
P-R INTERVAL, ECG05: 162 MS
P-R INTERVAL, ECG05: 186 MS
PH UR STRIP: 5.5 [PH] (ref 5–8)
PH UR STRIP: 6 [PH] (ref 5–8)
PHOSPHATE SERPL-MCNC: 1.7 MG/DL (ref 2.6–4.7)
PHOSPHATE SERPL-MCNC: 1.9 MG/DL (ref 2.6–4.7)
PHOSPHATE SERPL-MCNC: 2.3 MG/DL (ref 2.6–4.7)
PHOSPHATE SERPL-MCNC: 2.3 MG/DL (ref 2.6–4.7)
PHOSPHATE SERPL-MCNC: 2.4 MG/DL (ref 2.6–4.7)
PHOSPHATE SERPL-MCNC: 2.5 MG/DL (ref 2.6–4.7)
PHOSPHATE SERPL-MCNC: 2.6 MG/DL (ref 2.6–4.7)
PHOSPHATE SERPL-MCNC: 2.8 MG/DL (ref 2.6–4.7)
PHOSPHATE SERPL-MCNC: 2.8 MG/DL (ref 2.6–4.7)
PHOSPHATE SERPL-MCNC: 3 MG/DL (ref 2.6–4.7)
PHOSPHATE SERPL-MCNC: 3.4 MG/DL (ref 2.6–4.7)
PHOSPHATE SERPL-MCNC: 3.6 MG/DL (ref 2.6–4.7)
PHOSPHATE SERPL-MCNC: 3.7 MG/DL (ref 2.6–4.7)
PHOSPHATE SERPL-MCNC: 3.7 MG/DL (ref 2.6–4.7)
PHOSPHATE SERPL-MCNC: 4.1 MG/DL (ref 2.6–4.7)
PHOSPHATE SERPL-MCNC: 5.4 MG/DL (ref 2.6–4.7)
PISA AR MAX VEL: 289.15 CM/S
PLATELET # BLD AUTO: 140 K/UL (ref 150–400)
PLATELET # BLD AUTO: 140 K/UL (ref 150–400)
PLATELET # BLD AUTO: 144 K/UL (ref 150–400)
PLATELET # BLD AUTO: 155 K/UL (ref 150–400)
PLATELET # BLD AUTO: 168 K/UL (ref 150–400)
PLATELET # BLD AUTO: 170 K/UL (ref 150–400)
PLATELET # BLD AUTO: 183 K/UL (ref 150–400)
PLATELET # BLD AUTO: 183 K/UL (ref 150–400)
PLATELET # BLD AUTO: 186 K/UL (ref 150–400)
PLATELET # BLD AUTO: 187 K/UL (ref 150–400)
PLATELET # BLD AUTO: 191 K/UL (ref 150–400)
PLATELET # BLD AUTO: 217 K/UL (ref 150–400)
PLATELET # BLD AUTO: 217 K/UL (ref 150–400)
PLATELET # BLD AUTO: 230 K/UL (ref 150–400)
PLATELET # BLD AUTO: 280 K/UL (ref 150–400)
PLATELET # BLD AUTO: 310 K/UL (ref 150–400)
PLATELET # BLD AUTO: 325 K/UL (ref 150–400)
PMV BLD AUTO: 10 FL (ref 8.9–12.9)
PMV BLD AUTO: 10.1 FL (ref 8.9–12.9)
PMV BLD AUTO: 10.1 FL (ref 8.9–12.9)
PMV BLD AUTO: 10.3 FL (ref 8.9–12.9)
PMV BLD AUTO: 10.4 FL (ref 8.9–12.9)
PMV BLD AUTO: 10.6 FL (ref 8.9–12.9)
PMV BLD AUTO: 10.8 FL (ref 8.9–12.9)
PMV BLD AUTO: 10.8 FL (ref 8.9–12.9)
PMV BLD AUTO: 10.9 FL (ref 8.9–12.9)
PMV BLD AUTO: 11.1 FL (ref 8.9–12.9)
PMV BLD AUTO: 11.4 FL (ref 8.9–12.9)
PMV BLD AUTO: 11.5 FL (ref 8.9–12.9)
PMV BLD AUTO: 11.6 FL (ref 8.9–12.9)
PMV BLD AUTO: 11.7 FL (ref 8.9–12.9)
PMV BLD AUTO: 11.7 FL (ref 8.9–12.9)
POTASSIUM SERPL-SCNC: 2.4 MMOL/L (ref 3.5–5.1)
POTASSIUM SERPL-SCNC: 2.6 MMOL/L (ref 3.5–5.1)
POTASSIUM SERPL-SCNC: 2.7 MMOL/L (ref 3.5–5.1)
POTASSIUM SERPL-SCNC: 3 MMOL/L (ref 3.5–5.1)
POTASSIUM SERPL-SCNC: 3.2 MMOL/L (ref 3.5–5.1)
POTASSIUM SERPL-SCNC: 3.3 MMOL/L (ref 3.5–5.1)
POTASSIUM SERPL-SCNC: 3.4 MMOL/L (ref 3.5–5.1)
POTASSIUM SERPL-SCNC: 3.4 MMOL/L (ref 3.5–5.1)
POTASSIUM SERPL-SCNC: 3.5 MMOL/L (ref 3.5–5.1)
POTASSIUM SERPL-SCNC: 3.6 MMOL/L (ref 3.5–5.1)
POTASSIUM SERPL-SCNC: 3.8 MMOL/L (ref 3.5–5.1)
POTASSIUM SERPL-SCNC: 3.9 MMOL/L (ref 3.5–5.1)
POTASSIUM SERPL-SCNC: 4 MMOL/L (ref 3.5–5.1)
POTASSIUM SERPL-SCNC: 4.1 MMOL/L (ref 3.5–5.1)
POTASSIUM SERPL-SCNC: 4.1 MMOL/L (ref 3.5–5.1)
POTASSIUM SERPL-SCNC: 4.2 MMOL/L (ref 3.5–5.1)
POTASSIUM SERPL-SCNC: 4.2 MMOL/L (ref 3.5–5.1)
POTASSIUM SERPL-SCNC: 4.3 MMOL/L (ref 3.5–5.1)
POTASSIUM SERPL-SCNC: 4.4 MMOL/L (ref 3.5–5.1)
POTASSIUM SERPL-SCNC: 5.1 MMOL/L (ref 3.5–5.1)
POTASSIUM SERPL-SCNC: 5.1 MMOL/L (ref 3.5–5.1)
PREALB SERPL-MCNC: 15.3 MG/DL (ref 20–40)
PROT SERPL-MCNC: 4.1 G/DL (ref 6.4–8.2)
PROT SERPL-MCNC: 4.5 G/DL (ref 6.4–8.2)
PROT SERPL-MCNC: 4.8 G/DL (ref 6.4–8.2)
PROT SERPL-MCNC: 5.6 G/DL (ref 6.4–8.2)
PROT SERPL-MCNC: 5.8 G/DL (ref 6.4–8.2)
PROT SERPL-MCNC: 5.9 G/DL (ref 6.4–8.2)
PROT SERPL-MCNC: 6.2 G/DL (ref 6.4–8.2)
PROT SERPL-MCNC: 6.8 G/DL (ref 6.4–8.2)
PROT SERPL-MCNC: 7.2 G/DL (ref 6.4–8.2)
PROT UR STRIP-MCNC: 30 MG/DL
PROT UR STRIP-MCNC: ABNORMAL MG/DL
PROT UR-MCNC: 120 MG/DL (ref 0–11.9)
Q-T INTERVAL, ECG07: 380 MS
Q-T INTERVAL, ECG07: 422 MS
QRS DURATION, ECG06: 70 MS
QRS DURATION, ECG06: 70 MS
QTC CALCULATION (BEZET), ECG08: 475 MS
QTC CALCULATION (BEZET), ECG08: 493 MS
RBC # BLD AUTO: 3.38 M/UL (ref 4.1–5.7)
RBC # BLD AUTO: 3.42 M/UL (ref 4.1–5.7)
RBC # BLD AUTO: 3.45 M/UL (ref 4.1–5.7)
RBC # BLD AUTO: 3.52 M/UL (ref 4.1–5.7)
RBC # BLD AUTO: 3.52 M/UL (ref 4.1–5.7)
RBC # BLD AUTO: 3.54 M/UL (ref 4.1–5.7)
RBC # BLD AUTO: 3.57 M/UL (ref 4.1–5.7)
RBC # BLD AUTO: 3.67 M/UL (ref 4.1–5.7)
RBC # BLD AUTO: 3.78 M/UL (ref 4.1–5.7)
RBC # BLD AUTO: 3.93 M/UL (ref 4.1–5.7)
RBC # BLD AUTO: 3.94 M/UL (ref 4.1–5.7)
RBC # BLD AUTO: 4.24 M/UL (ref 4.1–5.7)
RBC # BLD AUTO: 4.35 M/UL (ref 4.1–5.7)
RBC # BLD AUTO: 4.56 M/UL (ref 4.1–5.7)
RBC # BLD AUTO: 4.68 M/UL (ref 4.1–5.7)
RBC # BLD AUTO: 4.69 M/UL (ref 4.1–5.7)
RBC # BLD AUTO: 5.1 M/UL (ref 4.1–5.7)
RBC #/AREA URNS HPF: ABNORMAL /HPF (ref 0–5)
RBC #/AREA URNS HPF: ABNORMAL /HPF (ref 0–5)
RBC MORPH BLD: ABNORMAL
SAMPLES BEING HELD,HOLD: NORMAL
SARS-COV-2, COV2: NOT DETECTED
SERVICE CMNT-IMP: ABNORMAL
SERVICE CMNT-IMP: NORMAL
SODIUM SERPL-SCNC: 137 MMOL/L (ref 136–145)
SODIUM SERPL-SCNC: 138 MMOL/L (ref 136–145)
SODIUM SERPL-SCNC: 140 MMOL/L (ref 136–145)
SODIUM SERPL-SCNC: 140 MMOL/L (ref 136–145)
SODIUM SERPL-SCNC: 141 MMOL/L (ref 136–145)
SODIUM SERPL-SCNC: 142 MMOL/L (ref 136–145)
SODIUM SERPL-SCNC: 143 MMOL/L (ref 136–145)
SODIUM SERPL-SCNC: 144 MMOL/L (ref 136–145)
SODIUM SERPL-SCNC: 145 MMOL/L (ref 136–145)
SODIUM SERPL-SCNC: 146 MMOL/L (ref 136–145)
SODIUM UR-SCNC: 22 MMOL/L
SP GR UR REFRACTOMETRY: 1.02 (ref 1–1.03)
SP GR UR REFRACTOMETRY: 1.03 (ref 1–1.03)
SPECIMEN EXP DATE BLD: NORMAL
SPECIMEN SOURCE, FCOV2M: NORMAL
TRIGL SERPL-MCNC: 29 MG/DL (ref ?–150)
TROPONIN I SERPL-MCNC: 0.05 NG/ML
TROPONIN I SERPL-MCNC: 0.1 NG/ML
TROPONIN I SERPL-MCNC: 0.11 NG/ML
TSH SERPL DL<=0.05 MIU/L-ACNC: 1.8 UIU/ML (ref 0.36–3.74)
UA: UC IF INDICATED,UAUC: ABNORMAL
UROBILINOGEN UR QL STRIP.AUTO: 0.2 EU/DL (ref 0.2–1)
UROBILINOGEN UR QL STRIP.AUTO: 1 EU/DL (ref 0.2–1)
VENTRICULAR RATE, ECG03: 82 BPM
VENTRICULAR RATE, ECG03: 94 BPM
VLDLC SERPL CALC-MCNC: NORMAL MG/DL
WBC # BLD AUTO: 10.1 K/UL (ref 4.1–11.1)
WBC # BLD AUTO: 10.5 K/UL (ref 4.1–11.1)
WBC # BLD AUTO: 10.8 K/UL (ref 4.1–11.1)
WBC # BLD AUTO: 11.4 K/UL (ref 4.1–11.1)
WBC # BLD AUTO: 11.5 K/UL (ref 4.1–11.1)
WBC # BLD AUTO: 11.9 K/UL (ref 4.1–11.1)
WBC # BLD AUTO: 12.2 K/UL (ref 4.1–11.1)
WBC # BLD AUTO: 12.3 K/UL (ref 4.1–11.1)
WBC # BLD AUTO: 13.5 K/UL (ref 4.1–11.1)
WBC # BLD AUTO: 15 K/UL (ref 4.1–11.1)
WBC # BLD AUTO: 16.8 K/UL (ref 4.1–11.1)
WBC # BLD AUTO: 17 K/UL (ref 4.1–11.1)
WBC # BLD AUTO: 7.4 K/UL (ref 4.1–11.1)
WBC # BLD AUTO: 8 K/UL (ref 4.1–11.1)
WBC # BLD AUTO: 8.5 K/UL (ref 4.1–11.1)
WBC # BLD AUTO: 8.7 K/UL (ref 4.1–11.1)
WBC # BLD AUTO: 8.9 K/UL (ref 4.1–11.1)
WBC MORPH BLD: ABNORMAL
WBC MORPH BLD: ABNORMAL
WBC URNS QL MICRO: >100 /HPF (ref 0–4)
WBC URNS QL MICRO: ABNORMAL /HPF (ref 0–4)

## 2020-01-01 PROCEDURE — 0651 HSPC ROUTINE HOME CARE

## 2020-01-01 PROCEDURE — 65270000029 HC RM PRIVATE

## 2020-01-01 PROCEDURE — 94760 N-INVAS EAR/PLS OXIMETRY 1: CPT

## 2020-01-01 PROCEDURE — 74018 RADEX ABDOMEN 1 VIEW: CPT

## 2020-01-01 PROCEDURE — C9113 INJ PANTOPRAZOLE SODIUM, VIA: HCPCS | Performed by: SPECIALIST

## 2020-01-01 PROCEDURE — 97116 GAIT TRAINING THERAPY: CPT

## 2020-01-01 PROCEDURE — 36415 COLL VENOUS BLD VENIPUNCTURE: CPT

## 2020-01-01 PROCEDURE — 83735 ASSAY OF MAGNESIUM: CPT

## 2020-01-01 PROCEDURE — 80053 COMPREHEN METABOLIC PANEL: CPT

## 2020-01-01 PROCEDURE — 97530 THERAPEUTIC ACTIVITIES: CPT

## 2020-01-01 PROCEDURE — 93306 TTE W/DOPPLER COMPLETE: CPT

## 2020-01-01 PROCEDURE — 74011000250 HC RX REV CODE- 250: Performed by: COLON & RECTAL SURGERY

## 2020-01-01 PROCEDURE — 74011250636 HC RX REV CODE- 250/636: Performed by: COLON & RECTAL SURGERY

## 2020-01-01 PROCEDURE — 74011250636 HC RX REV CODE- 250/636: Performed by: EMERGENCY MEDICINE

## 2020-01-01 PROCEDURE — 74011250637 HC RX REV CODE- 250/637: Performed by: COLON & RECTAL SURGERY

## 2020-01-01 PROCEDURE — 99285 EMERGENCY DEPT VISIT HI MDM: CPT

## 2020-01-01 PROCEDURE — G0156 HHCP-SVS OF AIDE,EA 15 MIN: HCPCS

## 2020-01-01 PROCEDURE — 74011000258 HC RX REV CODE- 258: Performed by: COLON & RECTAL SURGERY

## 2020-01-01 PROCEDURE — 77030005513 HC CATH URETH FOL11 MDII -B

## 2020-01-01 PROCEDURE — 82306 VITAMIN D 25 HYDROXY: CPT

## 2020-01-01 PROCEDURE — 74011250636 HC RX REV CODE- 250/636: Performed by: INTERNAL MEDICINE

## 2020-01-01 PROCEDURE — 88342 IMHCHEM/IMCYTCHM 1ST ANTB: CPT

## 2020-01-01 PROCEDURE — C9113 INJ PANTOPRAZOLE SODIUM, VIA: HCPCS | Performed by: COLON & RECTAL SURGERY

## 2020-01-01 PROCEDURE — 74011250637 HC RX REV CODE- 250/637: Performed by: SPECIALIST

## 2020-01-01 PROCEDURE — 82962 GLUCOSE BLOOD TEST: CPT

## 2020-01-01 PROCEDURE — 0655 HSPC INPATIENT RESPITE

## 2020-01-01 PROCEDURE — 74011250636 HC RX REV CODE- 250/636: Performed by: FAMILY MEDICINE

## 2020-01-01 PROCEDURE — 97535 SELF CARE MNGMENT TRAINING: CPT

## 2020-01-01 PROCEDURE — G0300 HHS/HOSPICE OF LPN EA 15 MIN: HCPCS

## 2020-01-01 PROCEDURE — 77030002996 HC SUT SLK J&J -A: Performed by: COLON & RECTAL SURGERY

## 2020-01-01 PROCEDURE — 80048 BASIC METABOLIC PNL TOTAL CA: CPT

## 2020-01-01 PROCEDURE — 74011250637 HC RX REV CODE- 250/637: Performed by: INTERNAL MEDICINE

## 2020-01-01 PROCEDURE — 85025 COMPLETE CBC W/AUTO DIFF WBC: CPT

## 2020-01-01 PROCEDURE — 87086 URINE CULTURE/COLONY COUNT: CPT

## 2020-01-01 PROCEDURE — 82570 ASSAY OF URINE CREATININE: CPT

## 2020-01-01 PROCEDURE — 84100 ASSAY OF PHOSPHORUS: CPT

## 2020-01-01 PROCEDURE — 77030011264 HC ELECTRD BLD EXT COVD -A: Performed by: COLON & RECTAL SURGERY

## 2020-01-01 PROCEDURE — G0299 HHS/HOSPICE OF RN EA 15 MIN: HCPCS

## 2020-01-01 PROCEDURE — 74011000258 HC RX REV CODE- 258: Performed by: INTERNAL MEDICINE

## 2020-01-01 PROCEDURE — 85027 COMPLETE CBC AUTOMATED: CPT

## 2020-01-01 PROCEDURE — 97110 THERAPEUTIC EXERCISES: CPT

## 2020-01-01 PROCEDURE — 80061 LIPID PANEL: CPT

## 2020-01-01 PROCEDURE — HOSPICE MEDICATION HC HH HOSPICE MEDICATION

## 2020-01-01 PROCEDURE — 88341 IMHCHEM/IMCYTCHM EA ADD ANTB: CPT

## 2020-01-01 PROCEDURE — HHS10554 SHAMPOO/BODY WASH 8 OZ ALOE VESTA

## 2020-01-01 PROCEDURE — 99232 SBSQ HOSP IP/OBS MODERATE 35: CPT | Performed by: FAMILY MEDICINE

## 2020-01-01 PROCEDURE — A6250 SKIN SEAL PROTECT MOISTURIZR: HCPCS

## 2020-01-01 PROCEDURE — 74011000250 HC RX REV CODE- 250: Performed by: FAMILY MEDICINE

## 2020-01-01 PROCEDURE — 3336500001 HSPC ELECTION

## 2020-01-01 PROCEDURE — 77030034696 HC CATH URETH FOL 2W BARD -A: Performed by: COLON & RECTAL SURGERY

## 2020-01-01 PROCEDURE — C1751 CATH, INF, PER/CENT/MIDLINE: HCPCS

## 2020-01-01 PROCEDURE — 87077 CULTURE AEROBIC IDENTIFY: CPT

## 2020-01-01 PROCEDURE — 77030002966 HC SUT PDS J&J -A: Performed by: COLON & RECTAL SURGERY

## 2020-01-01 PROCEDURE — A9270 NON-COVERED ITEM OR SERVICE: HCPCS

## 2020-01-01 PROCEDURE — 77030026438 HC STYL ET INTUB CARD -A: Performed by: ANESTHESIOLOGY

## 2020-01-01 PROCEDURE — 65660000000 HC RM CCU STEPDOWN

## 2020-01-01 PROCEDURE — 74011000250 HC RX REV CODE- 250: Performed by: NURSE ANESTHETIST, CERTIFIED REGISTERED

## 2020-01-01 PROCEDURE — E0325 URINAL MALE JUG-TYPE: HCPCS

## 2020-01-01 PROCEDURE — 74011250636 HC RX REV CODE- 250/636: Performed by: SPECIALIST

## 2020-01-01 PROCEDURE — 77030020847 HC STATLOK BARD -A

## 2020-01-01 PROCEDURE — 74011250636 HC RX REV CODE- 250/636: Performed by: NURSE ANESTHETIST, CERTIFIED REGISTERED

## 2020-01-01 PROCEDURE — 77030037255 HC PCH OST FLX SENSURA COLO -A

## 2020-01-01 PROCEDURE — 83605 ASSAY OF LACTIC ACID: CPT

## 2020-01-01 PROCEDURE — 0656 HSPC GENERAL INPATIENT

## 2020-01-01 PROCEDURE — 83690 ASSAY OF LIPASE: CPT

## 2020-01-01 PROCEDURE — 74011250636 HC RX REV CODE- 250/636: Performed by: ANESTHESIOLOGY

## 2020-01-01 PROCEDURE — 80069 RENAL FUNCTION PANEL: CPT

## 2020-01-01 PROCEDURE — 74011250637 HC RX REV CODE- 250/637: Performed by: FAMILY MEDICINE

## 2020-01-01 PROCEDURE — 74011250637 HC RX REV CODE- 250/637: Performed by: EMERGENCY MEDICINE

## 2020-01-01 PROCEDURE — 77030036731 HC STPLR ENDOSC J&J -F: Performed by: COLON & RECTAL SURGERY

## 2020-01-01 PROCEDURE — 74011000250 HC RX REV CODE- 250: Performed by: EMERGENCY MEDICINE

## 2020-01-01 PROCEDURE — 76210000016 HC OR PH I REC 1 TO 1.5 HR: Performed by: COLON & RECTAL SURGERY

## 2020-01-01 PROCEDURE — T4541 LARGE DISPOSABLE UNDERPAD: HCPCS

## 2020-01-01 PROCEDURE — 51798 US URINE CAPACITY MEASURE: CPT

## 2020-01-01 PROCEDURE — 76010000137 HC OR TIME 5 TO 5.5 HR: Performed by: COLON & RECTAL SURGERY

## 2020-01-01 PROCEDURE — 0DBN8ZX EXCISION OF SIGMOID COLON, VIA NATURAL OR ARTIFICIAL OPENING ENDOSCOPIC, DIAGNOSTIC: ICD-10-PCS | Performed by: SPECIALIST

## 2020-01-01 PROCEDURE — 77030021593 HC FCPS BIOP ENDOSC BSC -A: Performed by: SPECIALIST

## 2020-01-01 PROCEDURE — 77030002916 HC SUT ETHLN J&J -A: Performed by: COLON & RECTAL SURGERY

## 2020-01-01 PROCEDURE — 97165 OT EVAL LOW COMPLEX 30 MIN: CPT

## 2020-01-01 PROCEDURE — 77030011640 HC PAD GRND REM COVD -A: Performed by: COLON & RECTAL SURGERY

## 2020-01-01 PROCEDURE — 87040 BLOOD CULTURE FOR BACTERIA: CPT

## 2020-01-01 PROCEDURE — 84484 ASSAY OF TROPONIN QUANT: CPT

## 2020-01-01 PROCEDURE — 93005 ELECTROCARDIOGRAM TRACING: CPT

## 2020-01-01 PROCEDURE — 77030034696 HC CATH URETH FOL 2W BARD -A

## 2020-01-01 PROCEDURE — C1769 GUIDE WIRE: HCPCS | Performed by: COLON & RECTAL SURGERY

## 2020-01-01 PROCEDURE — 77030018786 HC NDL GD F/USND BARD -B

## 2020-01-01 PROCEDURE — 76060000041 HC ANESTHESIA 5 TO 5.5 HR: Performed by: COLON & RECTAL SURGERY

## 2020-01-01 PROCEDURE — 77030010507 HC ADH SKN DERMBND J&J -B: Performed by: COLON & RECTAL SURGERY

## 2020-01-01 PROCEDURE — 77030019905 HC CATH URETH INTMIT MDII -A

## 2020-01-01 PROCEDURE — 02HV33Z INSERTION OF INFUSION DEVICE INTO SUPERIOR VENA CAVA, PERCUTANEOUS APPROACH: ICD-10-PCS | Performed by: COLON & RECTAL SURGERY

## 2020-01-01 PROCEDURE — 74011000250 HC RX REV CODE- 250: Performed by: INTERNAL MEDICINE

## 2020-01-01 PROCEDURE — 96374 THER/PROPH/DIAG INJ IV PUSH: CPT

## 2020-01-01 PROCEDURE — 99233 SBSQ HOSP IP/OBS HIGH 50: CPT | Performed by: FAMILY MEDICINE

## 2020-01-01 PROCEDURE — 36573 INSJ PICC RS&I 5 YR+: CPT | Performed by: COLON & RECTAL SURGERY

## 2020-01-01 PROCEDURE — 74011250636 HC RX REV CODE- 250/636: Performed by: NURSE PRACTITIONER

## 2020-01-01 PROCEDURE — 77030020061 HC IV BLD WRMR ADMIN SET 3M -B: Performed by: ANESTHESIOLOGY

## 2020-01-01 PROCEDURE — 88305 TISSUE EXAM BY PATHOLOGIST: CPT

## 2020-01-01 PROCEDURE — 96361 HYDRATE IV INFUSION ADD-ON: CPT

## 2020-01-01 PROCEDURE — 81001 URINALYSIS AUTO W/SCOPE: CPT

## 2020-01-01 PROCEDURE — 86900 BLOOD TYPING SEROLOGIC ABO: CPT

## 2020-01-01 PROCEDURE — 77030008684 HC TU ET CUF COVD -B: Performed by: ANESTHESIOLOGY

## 2020-01-01 PROCEDURE — 77030031139 HC SUT VCRL2 J&J -A: Performed by: COLON & RECTAL SURGERY

## 2020-01-01 PROCEDURE — 0T7D8ZZ DILATION OF URETHRA, VIA NATURAL OR ARTIFICIAL OPENING ENDOSCOPIC: ICD-10-PCS | Performed by: UROLOGY

## 2020-01-01 PROCEDURE — A4927 NON-STERILE GLOVES: HCPCS

## 2020-01-01 PROCEDURE — 74011250637 HC RX REV CODE- 250/637: Performed by: NURSE PRACTITIONER

## 2020-01-01 PROCEDURE — 77030013079 HC BLNKT BAIR HGGR 3M -A: Performed by: ANESTHESIOLOGY

## 2020-01-01 PROCEDURE — 77030008771 HC TU NG SALEM SUMP -A: Performed by: ANESTHESIOLOGY

## 2020-01-01 PROCEDURE — 77030018831 HC SOL IRR H20 BAXT -A: Performed by: COLON & RECTAL SURGERY

## 2020-01-01 PROCEDURE — 87635 SARS-COV-2 COVID-19 AMP PRB: CPT

## 2020-01-01 PROCEDURE — 77030040506 HC DRN WND MDII -A: Performed by: COLON & RECTAL SURGERY

## 2020-01-01 PROCEDURE — 74011000250 HC RX REV CODE- 250: Performed by: SPECIALIST

## 2020-01-01 PROCEDURE — 76040000019: Performed by: SPECIALIST

## 2020-01-01 PROCEDURE — 77030027138 HC INCENT SPIROMETER -A

## 2020-01-01 PROCEDURE — 77030018795 HC PASTE OST COLO -B

## 2020-01-01 PROCEDURE — 97161 PT EVAL LOW COMPLEX 20 MIN: CPT

## 2020-01-01 PROCEDURE — 97535 SELF CARE MNGMENT TRAINING: CPT | Performed by: OCCUPATIONAL THERAPIST

## 2020-01-01 PROCEDURE — 77030018836 HC SOL IRR NACL ICUM -A: Performed by: COLON & RECTAL SURGERY

## 2020-01-01 PROCEDURE — 87205 SMEAR GRAM STAIN: CPT

## 2020-01-01 PROCEDURE — 84156 ASSAY OF PROTEIN URINE: CPT

## 2020-01-01 PROCEDURE — 76937 US GUIDE VASCULAR ACCESS: CPT

## 2020-01-01 PROCEDURE — 74176 CT ABD & PELVIS W/O CONTRAST: CPT

## 2020-01-01 PROCEDURE — 76060000031 HC ANESTHESIA FIRST 0.5 HR: Performed by: SPECIALIST

## 2020-01-01 PROCEDURE — G0155 HHCP-SVS OF CSW,EA 15 MIN: HCPCS

## 2020-01-01 PROCEDURE — 84300 ASSAY OF URINE SODIUM: CPT

## 2020-01-01 PROCEDURE — 77030019927 HC TBNG IRR CYSTO BAXT -A: Performed by: COLON & RECTAL SURGERY

## 2020-01-01 PROCEDURE — 82378 CARCINOEMBRYONIC ANTIGEN: CPT

## 2020-01-01 PROCEDURE — 77030040830 HC CATH URETH FOL MDII -A: Performed by: COLON & RECTAL SURGERY

## 2020-01-01 PROCEDURE — 0DNN0ZZ RELEASE SIGMOID COLON, OPEN APPROACH: ICD-10-PCS | Performed by: COLON & RECTAL SURGERY

## 2020-01-01 PROCEDURE — 71045 X-RAY EXAM CHEST 1 VIEW: CPT

## 2020-01-01 PROCEDURE — 84443 ASSAY THYROID STIM HORMONE: CPT

## 2020-01-01 PROCEDURE — 94761 N-INVAS EAR/PLS OXIMETRY MLT: CPT

## 2020-01-01 PROCEDURE — 77030003029 HC SUT VCRL J&J -B: Performed by: COLON & RECTAL SURGERY

## 2020-01-01 PROCEDURE — 71260 CT THORAX DX C+: CPT

## 2020-01-01 PROCEDURE — 84134 ASSAY OF PREALBUMIN: CPT

## 2020-01-01 PROCEDURE — 77030015926 HC DEV TISS SEAL J&J -E: Performed by: COLON & RECTAL SURGERY

## 2020-01-01 PROCEDURE — 77030012865 HC BG URIN LEG MDII -A

## 2020-01-01 PROCEDURE — 51702 INSERT TEMP BLADDER CATH: CPT

## 2020-01-01 PROCEDURE — 77030009979 HC RELD STPLR TCR J&J -C: Performed by: COLON & RECTAL SURGERY

## 2020-01-01 PROCEDURE — 77030002933 HC SUT MCRYL J&J -A: Performed by: COLON & RECTAL SURGERY

## 2020-01-01 PROCEDURE — C9290 INJ, BUPIVACAINE LIPOSOME: HCPCS | Performed by: COLON & RECTAL SURGERY

## 2020-01-01 PROCEDURE — 74011636320 HC RX REV CODE- 636/320: Performed by: STUDENT IN AN ORGANIZED HEALTH CARE EDUCATION/TRAINING PROGRAM

## 2020-01-01 PROCEDURE — C1765 ADHESION BARRIER: HCPCS | Performed by: COLON & RECTAL SURGERY

## 2020-01-01 PROCEDURE — T4527 ADULT SIZE PULL-ON LG: HCPCS

## 2020-01-01 PROCEDURE — 77030019563 HC DEV ATTCH FEED HOLL -A

## 2020-01-01 PROCEDURE — 70450 CT HEAD/BRAIN W/O DYE: CPT

## 2020-01-01 PROCEDURE — 0D1N0Z4 BYPASS SIGMOID COLON TO CUTANEOUS, OPEN APPROACH: ICD-10-PCS | Performed by: COLON & RECTAL SURGERY

## 2020-01-01 PROCEDURE — 87186 SC STD MICRODIL/AGAR DIL: CPT

## 2020-01-01 PROCEDURE — 74011636637 HC RX REV CODE- 636/637: Performed by: INTERNAL MEDICINE

## 2020-01-01 RX ORDER — POTASSIUM CHLORIDE 7.45 MG/ML
10 INJECTION INTRAVENOUS
Status: COMPLETED | OUTPATIENT
Start: 2020-01-01 | End: 2020-01-01

## 2020-01-01 RX ORDER — MORPHINE SULFATE 2 MG/ML
4 INJECTION, SOLUTION INTRAMUSCULAR; INTRAVENOUS
Status: DISCONTINUED | OUTPATIENT
Start: 2020-01-01 | End: 2020-01-01

## 2020-01-01 RX ORDER — LORAZEPAM 2 MG/ML
0.5 CONCENTRATE ORAL
Status: DISCONTINUED | OUTPATIENT
Start: 2020-01-01 | End: 2020-01-01

## 2020-01-01 RX ORDER — LABETALOL HCL 20 MG/4 ML
10 SYRINGE (ML) INTRAVENOUS
Status: DISCONTINUED | OUTPATIENT
Start: 2020-01-01 | End: 2020-01-01

## 2020-01-01 RX ORDER — PROPOFOL 10 MG/ML
INJECTION, EMULSION INTRAVENOUS AS NEEDED
Status: DISCONTINUED | OUTPATIENT
Start: 2020-01-01 | End: 2020-01-01 | Stop reason: HOSPADM

## 2020-01-01 RX ORDER — MAGNESIUM SULFATE 100 %
4 CRYSTALS MISCELLANEOUS AS NEEDED
Status: DISCONTINUED | OUTPATIENT
Start: 2020-01-01 | End: 2020-01-01 | Stop reason: HOSPADM

## 2020-01-01 RX ORDER — SODIUM BICARBONATE 650 MG/1
650 TABLET ORAL 2 TIMES DAILY
Qty: 60 TAB | Refills: 0 | Status: SHIPPED
Start: 2020-01-01

## 2020-01-01 RX ORDER — HALOPERIDOL 2 MG/ML
1 SOLUTION ORAL
Status: DISCONTINUED | OUTPATIENT
Start: 2020-01-01 | End: 2020-01-01 | Stop reason: HOSPADM

## 2020-01-01 RX ORDER — MORPHINE SULFATE 2 MG/ML
1 INJECTION, SOLUTION INTRAMUSCULAR; INTRAVENOUS
Status: DISCONTINUED | OUTPATIENT
Start: 2020-01-01 | End: 2020-01-01

## 2020-01-01 RX ORDER — SODIUM BICARBONATE 650 MG/1
650 TABLET ORAL 2 TIMES DAILY
Status: DISCONTINUED | OUTPATIENT
Start: 2020-01-01 | End: 2020-01-01 | Stop reason: HOSPADM

## 2020-01-01 RX ORDER — ONDANSETRON 2 MG/ML
4 INJECTION INTRAMUSCULAR; INTRAVENOUS
Status: DISCONTINUED | OUTPATIENT
Start: 2020-01-01 | End: 2020-01-01

## 2020-01-01 RX ORDER — MAGNESIUM SULFATE 1 G/100ML
1 INJECTION INTRAVENOUS ONCE
Status: COMPLETED | OUTPATIENT
Start: 2020-01-01 | End: 2020-01-01

## 2020-01-01 RX ORDER — QUETIAPINE FUMARATE 25 MG/1
25 TABLET, FILM COATED ORAL
Status: DISCONTINUED | OUTPATIENT
Start: 2020-01-01 | End: 2020-01-01

## 2020-01-01 RX ORDER — HYOSCYAMINE SULFATE 0.12 MG/ML
125 LIQUID ORAL
COMMUNITY
End: 2020-01-01

## 2020-01-01 RX ORDER — LABETALOL 100 MG/1
100 TABLET, FILM COATED ORAL EVERY 12 HOURS
Status: DISCONTINUED | OUTPATIENT
Start: 2020-01-01 | End: 2020-01-01 | Stop reason: HOSPADM

## 2020-01-01 RX ORDER — ACETAMINOPHEN 325 MG/1
650 TABLET ORAL
Qty: 30 TAB | Refills: 0 | Status: SHIPPED
Start: 2020-01-01 | End: 2020-01-01

## 2020-01-01 RX ORDER — VECURONIUM BROMIDE FOR INJECTION 1 MG/ML
INJECTION, POWDER, LYOPHILIZED, FOR SOLUTION INTRAVENOUS AS NEEDED
Status: DISCONTINUED | OUTPATIENT
Start: 2020-01-01 | End: 2020-01-01 | Stop reason: HOSPADM

## 2020-01-01 RX ORDER — ONDANSETRON 4 MG/1
4 TABLET, ORALLY DISINTEGRATING ORAL
Status: DISCONTINUED | OUTPATIENT
Start: 2020-01-01 | End: 2020-01-01 | Stop reason: HOSPADM

## 2020-01-01 RX ORDER — NALOXONE HYDROCHLORIDE 0.4 MG/ML
0.4 INJECTION, SOLUTION INTRAMUSCULAR; INTRAVENOUS; SUBCUTANEOUS
Status: DISCONTINUED | OUTPATIENT
Start: 2020-01-01 | End: 2020-01-01 | Stop reason: HOSPADM

## 2020-01-01 RX ORDER — HALOPERIDOL 5 MG/ML
2 INJECTION INTRAMUSCULAR
Status: DISCONTINUED | OUTPATIENT
Start: 2020-01-01 | End: 2020-01-01 | Stop reason: HOSPADM

## 2020-01-01 RX ORDER — FENTANYL CITRATE 50 UG/ML
25 INJECTION, SOLUTION INTRAMUSCULAR; INTRAVENOUS AS NEEDED
Status: DISCONTINUED | OUTPATIENT
Start: 2020-01-01 | End: 2020-01-01 | Stop reason: HOSPADM

## 2020-01-01 RX ORDER — OXYCODONE HCL 20 MG/1
40 TABLET, FILM COATED, EXTENDED RELEASE ORAL EVERY 12 HOURS
Status: DISCONTINUED | OUTPATIENT
Start: 2020-01-01 | End: 2020-01-01

## 2020-01-01 RX ORDER — LIDOCAINE HYDROCHLORIDE 10 MG/ML
0.1 INJECTION, SOLUTION EPIDURAL; INFILTRATION; INTRACAUDAL; PERINEURAL AS NEEDED
Status: DISCONTINUED | OUTPATIENT
Start: 2020-01-01 | End: 2020-01-01 | Stop reason: HOSPADM

## 2020-01-01 RX ORDER — DEXTROSE MONOHYDRATE 100 MG/ML
0-250 INJECTION, SOLUTION INTRAVENOUS AS NEEDED
Status: DISCONTINUED | OUTPATIENT
Start: 2020-01-01 | End: 2020-01-01 | Stop reason: HOSPADM

## 2020-01-01 RX ORDER — LEVOFLOXACIN 5 MG/ML
750 INJECTION, SOLUTION INTRAVENOUS
Status: DISCONTINUED | OUTPATIENT
Start: 2020-01-01 | End: 2020-01-01

## 2020-01-01 RX ORDER — LIDOCAINE HYDROCHLORIDE 20 MG/ML
INJECTION, SOLUTION EPIDURAL; INFILTRATION; INTRACAUDAL; PERINEURAL AS NEEDED
Status: DISCONTINUED | OUTPATIENT
Start: 2020-01-01 | End: 2020-01-01 | Stop reason: HOSPADM

## 2020-01-01 RX ORDER — SODIUM CHLORIDE 9 MG/ML
100 INJECTION, SOLUTION INTRAVENOUS ONCE
Status: COMPLETED | OUTPATIENT
Start: 2020-01-01 | End: 2020-01-01

## 2020-01-01 RX ORDER — SUCCINYLCHOLINE CHLORIDE 20 MG/ML
INJECTION INTRAMUSCULAR; INTRAVENOUS AS NEEDED
Status: DISCONTINUED | OUTPATIENT
Start: 2020-01-01 | End: 2020-01-01 | Stop reason: HOSPADM

## 2020-01-01 RX ORDER — QUETIAPINE FUMARATE 25 MG/1
25 TABLET, FILM COATED ORAL
Status: DISCONTINUED | OUTPATIENT
Start: 2020-01-01 | End: 2020-01-01 | Stop reason: HOSPADM

## 2020-01-01 RX ORDER — POTASSIUM CHLORIDE 750 MG/1
40 TABLET, FILM COATED, EXTENDED RELEASE ORAL DAILY
Status: DISCONTINUED | OUTPATIENT
Start: 2020-01-01 | End: 2020-01-01 | Stop reason: HOSPADM

## 2020-01-01 RX ORDER — SODIUM CHLORIDE 0.9 % (FLUSH) 0.9 %
5-40 SYRINGE (ML) INJECTION EVERY 8 HOURS
Status: DISCONTINUED | OUTPATIENT
Start: 2020-01-01 | End: 2020-01-01 | Stop reason: HOSPADM

## 2020-01-01 RX ORDER — SODIUM CHLORIDE 9 MG/ML
100 INJECTION, SOLUTION INTRAVENOUS CONTINUOUS
Status: DISCONTINUED | OUTPATIENT
Start: 2020-01-01 | End: 2020-01-01

## 2020-01-01 RX ORDER — POTASSIUM CHLORIDE 750 MG/1
10 TABLET, FILM COATED, EXTENDED RELEASE ORAL 2 TIMES DAILY
COMMUNITY
Start: 2020-01-01

## 2020-01-01 RX ORDER — ASPIRIN 325 MG
325 TABLET ORAL
Status: COMPLETED | OUTPATIENT
Start: 2020-01-01 | End: 2020-01-01

## 2020-01-01 RX ORDER — OXYCODONE HCL 20 MG/ML
20 CONCENTRATE, ORAL ORAL EVERY 4 HOURS
Status: DISCONTINUED | OUTPATIENT
Start: 2020-01-01 | End: 2020-01-01

## 2020-01-01 RX ORDER — HYDROMORPHONE HYDROCHLORIDE 1 MG/ML
1 INJECTION, SOLUTION INTRAMUSCULAR; INTRAVENOUS; SUBCUTANEOUS
Status: DISCONTINUED | OUTPATIENT
Start: 2020-01-01 | End: 2020-01-01

## 2020-01-01 RX ORDER — HEPARIN SODIUM 5000 [USP'U]/ML
5000 INJECTION, SOLUTION INTRAVENOUS; SUBCUTANEOUS EVERY 8 HOURS
Status: DISCONTINUED | OUTPATIENT
Start: 2020-01-01 | End: 2020-01-01

## 2020-01-01 RX ORDER — HYDROMORPHONE HYDROCHLORIDE 1 MG/ML
1 INJECTION, SOLUTION INTRAMUSCULAR; INTRAVENOUS; SUBCUTANEOUS EVERY 4 HOURS
Status: DISCONTINUED | OUTPATIENT
Start: 2020-01-01 | End: 2020-01-01

## 2020-01-01 RX ORDER — HYDROMORPHONE HCL/0.9% NACL/PF 0.5 MG/ML
PLASTIC BAG, INJECTION (ML) INTRAVENOUS
Status: DISCONTINUED | OUTPATIENT
Start: 2020-01-01 | End: 2020-01-01

## 2020-01-01 RX ORDER — GLYCOPYRROLATE 0.2 MG/ML
INJECTION INTRAMUSCULAR; INTRAVENOUS AS NEEDED
Status: DISCONTINUED | OUTPATIENT
Start: 2020-01-01 | End: 2020-01-01 | Stop reason: HOSPADM

## 2020-01-01 RX ORDER — SODIUM CHLORIDE 9 MG/ML
50 INJECTION, SOLUTION INTRAVENOUS CONTINUOUS
Status: DISCONTINUED | OUTPATIENT
Start: 2020-01-01 | End: 2020-01-01

## 2020-01-01 RX ORDER — POTASSIUM CHLORIDE 750 MG/1
40 TABLET, FILM COATED, EXTENDED RELEASE ORAL ONCE
Status: COMPLETED | OUTPATIENT
Start: 2020-01-01 | End: 2020-01-01

## 2020-01-01 RX ORDER — HALOPERIDOL 2 MG/ML
2 SOLUTION ORAL
Status: DISCONTINUED | OUTPATIENT
Start: 2020-01-01 | End: 2020-01-01

## 2020-01-01 RX ORDER — EPHEDRINE SULFATE/0.9% NACL/PF 50 MG/5 ML
SYRINGE (ML) INTRAVENOUS AS NEEDED
Status: DISCONTINUED | OUTPATIENT
Start: 2020-01-01 | End: 2020-01-01 | Stop reason: HOSPADM

## 2020-01-01 RX ORDER — HYOSCYAMINE SULFATE 0.12 MG/1
0.12 TABLET SUBLINGUAL
Status: DISCONTINUED | OUTPATIENT
Start: 2020-01-01 | End: 2020-01-01 | Stop reason: HOSPADM

## 2020-01-01 RX ORDER — GLYCOPYRROLATE 0.2 MG/ML
0.2 INJECTION INTRAMUSCULAR; INTRAVENOUS EVERY 4 HOURS
Status: DISCONTINUED | OUTPATIENT
Start: 2020-01-01 | End: 2020-01-01 | Stop reason: HOSPADM

## 2020-01-01 RX ORDER — HYDRALAZINE HYDROCHLORIDE 20 MG/ML
20 INJECTION INTRAMUSCULAR; INTRAVENOUS
Status: DISCONTINUED | OUTPATIENT
Start: 2020-01-01 | End: 2020-01-01 | Stop reason: HOSPADM

## 2020-01-01 RX ORDER — LABETALOL 100 MG/1
100 TABLET, FILM COATED ORAL EVERY 12 HOURS
Qty: 60 TAB | Refills: 0 | Status: SHIPPED
Start: 2020-01-01

## 2020-01-01 RX ORDER — LIDOCAINE HYDROCHLORIDE 20 MG/ML
JELLY TOPICAL
Status: DISPENSED | OUTPATIENT
Start: 2020-01-01 | End: 2020-01-01

## 2020-01-01 RX ORDER — METRONIDAZOLE 500 MG/100ML
500 INJECTION, SOLUTION INTRAVENOUS EVERY 8 HOURS
Status: DISCONTINUED | OUTPATIENT
Start: 2020-01-01 | End: 2020-01-01

## 2020-01-01 RX ORDER — ACETAMINOPHEN 650 MG/1
650 SUPPOSITORY RECTAL
Status: DISCONTINUED | OUTPATIENT
Start: 2020-01-01 | End: 2020-01-01 | Stop reason: HOSPADM

## 2020-01-01 RX ORDER — CHOLECALCIFEROL (VITAMIN D3) 125 MCG
1000 CAPSULE ORAL DAILY
COMMUNITY
End: 2020-01-01

## 2020-01-01 RX ORDER — SODIUM CHLORIDE 0.9 % (FLUSH) 0.9 %
5-10 SYRINGE (ML) INJECTION AS NEEDED
Status: DISCONTINUED | OUTPATIENT
Start: 2020-01-01 | End: 2020-01-01 | Stop reason: HOSPADM

## 2020-01-01 RX ORDER — CEFAZOLIN SODIUM 1 G/3ML
INJECTION, POWDER, FOR SOLUTION INTRAMUSCULAR; INTRAVENOUS AS NEEDED
Status: DISCONTINUED | OUTPATIENT
Start: 2020-01-01 | End: 2020-01-01 | Stop reason: HOSPADM

## 2020-01-01 RX ORDER — FLUMAZENIL 0.1 MG/ML
0.2 INJECTION INTRAVENOUS
Status: DISCONTINUED | OUTPATIENT
Start: 2020-01-01 | End: 2020-01-01 | Stop reason: HOSPADM

## 2020-01-01 RX ORDER — PROCHLORPERAZINE MALEATE 5 MG
10 TABLET ORAL
Status: DISCONTINUED | OUTPATIENT
Start: 2020-01-01 | End: 2020-01-01 | Stop reason: HOSPADM

## 2020-01-01 RX ORDER — LORAZEPAM 2 MG/ML
0.5 INJECTION INTRAMUSCULAR
Status: DISCONTINUED | OUTPATIENT
Start: 2020-01-01 | End: 2020-01-01 | Stop reason: HOSPADM

## 2020-01-01 RX ORDER — HYOSCYAMINE SULFATE 0.12 MG/1
0.12 TABLET SUBLINGUAL
Status: DISCONTINUED | OUTPATIENT
Start: 2020-01-01 | End: 2020-01-01

## 2020-01-01 RX ORDER — ACETAMINOPHEN 325 MG/1
650 TABLET ORAL
Status: DISCONTINUED | OUTPATIENT
Start: 2020-01-01 | End: 2020-01-01 | Stop reason: HOSPADM

## 2020-01-01 RX ORDER — HYDROMORPHONE HYDROCHLORIDE 1 MG/ML
0.5 INJECTION, SOLUTION INTRAMUSCULAR; INTRAVENOUS; SUBCUTANEOUS
Status: DISCONTINUED | OUTPATIENT
Start: 2020-01-01 | End: 2020-01-01 | Stop reason: HOSPADM

## 2020-01-01 RX ORDER — AMOXICILLIN 250 MG
1 CAPSULE ORAL DAILY
COMMUNITY
Start: 2020-01-01

## 2020-01-01 RX ORDER — AMLODIPINE BESYLATE 5 MG/1
5 TABLET ORAL DAILY
Status: DISCONTINUED | OUTPATIENT
Start: 2020-01-01 | End: 2020-01-01

## 2020-01-01 RX ORDER — ONDANSETRON 4 MG/1
4 TABLET, ORALLY DISINTEGRATING ORAL
Qty: 20 TAB | Refills: 0 | Status: SHIPPED | OUTPATIENT
Start: 2020-01-01 | End: 2020-01-01

## 2020-01-01 RX ORDER — METRONIDAZOLE 500 MG/100ML
500 INJECTION, SOLUTION INTRAVENOUS EVERY 12 HOURS
Status: DISCONTINUED | OUTPATIENT
Start: 2020-01-01 | End: 2020-01-01

## 2020-01-01 RX ORDER — FAMOTIDINE 10 MG/ML
INJECTION INTRAVENOUS AS NEEDED
Status: DISCONTINUED | OUTPATIENT
Start: 2020-01-01 | End: 2020-01-01 | Stop reason: HOSPADM

## 2020-01-01 RX ORDER — ASPIRIN 81 MG/1
81 TABLET ORAL DAILY
Status: DISCONTINUED | OUTPATIENT
Start: 2020-01-01 | End: 2020-01-01

## 2020-01-01 RX ORDER — OXYCODONE HCL 20 MG/ML
20 CONCENTRATE, ORAL ORAL
Status: DISCONTINUED | OUTPATIENT
Start: 2020-01-01 | End: 2020-01-01

## 2020-01-01 RX ORDER — SODIUM CHLORIDE, SODIUM LACTATE, POTASSIUM CHLORIDE, CALCIUM CHLORIDE 600; 310; 30; 20 MG/100ML; MG/100ML; MG/100ML; MG/100ML
INJECTION, SOLUTION INTRAVENOUS
Status: DISCONTINUED | OUTPATIENT
Start: 2020-01-01 | End: 2020-01-01 | Stop reason: HOSPADM

## 2020-01-01 RX ORDER — HYDROMORPHONE HYDROCHLORIDE 2 MG/ML
2 INJECTION, SOLUTION INTRAMUSCULAR; INTRAVENOUS; SUBCUTANEOUS
Status: DISCONTINUED | OUTPATIENT
Start: 2020-01-01 | End: 2020-01-01 | Stop reason: HOSPADM

## 2020-01-01 RX ORDER — NEOSTIGMINE METHYLSULFATE 1 MG/ML
INJECTION, SOLUTION INTRAVENOUS AS NEEDED
Status: DISCONTINUED | OUTPATIENT
Start: 2020-01-01 | End: 2020-01-01 | Stop reason: HOSPADM

## 2020-01-01 RX ORDER — SODIUM CHLORIDE 0.9 % (FLUSH) 0.9 %
5-40 SYRINGE (ML) INJECTION AS NEEDED
Status: DISCONTINUED | OUTPATIENT
Start: 2020-01-01 | End: 2020-01-01 | Stop reason: HOSPADM

## 2020-01-01 RX ORDER — ROCURONIUM BROMIDE 10 MG/ML
INJECTION, SOLUTION INTRAVENOUS AS NEEDED
Status: DISCONTINUED | OUTPATIENT
Start: 2020-01-01 | End: 2020-01-01 | Stop reason: HOSPADM

## 2020-01-01 RX ORDER — TRAMADOL HYDROCHLORIDE 50 MG/1
50 TABLET ORAL
Qty: 20 TAB | Refills: 0 | Status: SHIPPED | OUTPATIENT
Start: 2020-01-01 | End: 2020-01-01

## 2020-01-01 RX ORDER — AMOXICILLIN 250 MG
2 CAPSULE ORAL
Status: DISCONTINUED | OUTPATIENT
Start: 2020-01-01 | End: 2020-01-01

## 2020-01-01 RX ORDER — OXYCODONE HYDROCHLORIDE 5 MG/1
20 TABLET ORAL
Status: DISCONTINUED | OUTPATIENT
Start: 2020-01-01 | End: 2020-01-01 | Stop reason: HOSPADM

## 2020-01-01 RX ORDER — POTASSIUM CHLORIDE 14.9 MG/ML
10 INJECTION INTRAVENOUS
Status: COMPLETED | OUTPATIENT
Start: 2020-01-01 | End: 2020-01-01

## 2020-01-01 RX ORDER — SODIUM CHLORIDE 9 MG/ML
INJECTION, SOLUTION INTRAVENOUS
Status: DISCONTINUED | OUTPATIENT
Start: 2020-01-01 | End: 2020-01-01 | Stop reason: HOSPADM

## 2020-01-01 RX ORDER — LORAZEPAM 2 MG/ML
0.5 CONCENTRATE ORAL ONCE
Status: COMPLETED | OUTPATIENT
Start: 2020-01-01 | End: 2020-01-01

## 2020-01-01 RX ORDER — HYDROMORPHONE HYDROCHLORIDE 2 MG/ML
2 INJECTION, SOLUTION INTRAMUSCULAR; INTRAVENOUS; SUBCUTANEOUS EVERY 4 HOURS
Status: DISCONTINUED | OUTPATIENT
Start: 2020-01-01 | End: 2020-01-01 | Stop reason: HOSPADM

## 2020-01-01 RX ORDER — AMLODIPINE BESYLATE 5 MG/1
5 TABLET ORAL ONCE
Status: COMPLETED | OUTPATIENT
Start: 2020-01-01 | End: 2020-01-01

## 2020-01-01 RX ORDER — HYDROMORPHONE HYDROCHLORIDE 1 MG/ML
.25-1 INJECTION, SOLUTION INTRAMUSCULAR; INTRAVENOUS; SUBCUTANEOUS
Status: DISCONTINUED | OUTPATIENT
Start: 2020-01-01 | End: 2020-01-01 | Stop reason: HOSPADM

## 2020-01-01 RX ORDER — MORPHINE SULFATE 2 MG/ML
4 INJECTION, SOLUTION INTRAMUSCULAR; INTRAVENOUS EVERY 4 HOURS
Status: DISCONTINUED | OUTPATIENT
Start: 2020-01-01 | End: 2020-01-01

## 2020-01-01 RX ORDER — SODIUM CHLORIDE, SODIUM LACTATE, POTASSIUM CHLORIDE, CALCIUM CHLORIDE 600; 310; 30; 20 MG/100ML; MG/100ML; MG/100ML; MG/100ML
100 INJECTION, SOLUTION INTRAVENOUS CONTINUOUS
Status: DISCONTINUED | OUTPATIENT
Start: 2020-01-01 | End: 2020-01-01 | Stop reason: HOSPADM

## 2020-01-01 RX ORDER — OXYCODONE HYDROCHLORIDE 10 MG/1
20 TABLET ORAL
COMMUNITY
Start: 2020-01-01 | End: 2020-01-01 | Stop reason: CLARIF

## 2020-01-01 RX ORDER — LEVOFLOXACIN 5 MG/ML
500 INJECTION, SOLUTION INTRAVENOUS
Status: DISCONTINUED | OUTPATIENT
Start: 2020-01-01 | End: 2020-01-01

## 2020-01-01 RX ORDER — LORAZEPAM 2 MG/ML
0.5 CONCENTRATE ORAL
Status: DISCONTINUED | OUTPATIENT
Start: 2020-01-01 | End: 2020-01-01 | Stop reason: HOSPADM

## 2020-01-01 RX ORDER — FACIAL-BODY WIPES
10 EACH TOPICAL DAILY PRN
Status: DISCONTINUED | OUTPATIENT
Start: 2020-01-01 | End: 2020-01-01 | Stop reason: HOSPADM

## 2020-01-01 RX ORDER — INSULIN LISPRO 100 [IU]/ML
INJECTION, SOLUTION INTRAVENOUS; SUBCUTANEOUS EVERY 6 HOURS
Status: DISCONTINUED | OUTPATIENT
Start: 2020-01-01 | End: 2020-01-01 | Stop reason: HOSPADM

## 2020-01-01 RX ORDER — LABETALOL 100 MG/1
100 TABLET, FILM COATED ORAL 2 TIMES DAILY
Status: DISCONTINUED | OUTPATIENT
Start: 2020-01-01 | End: 2020-01-01

## 2020-01-01 RX ORDER — ACETAMINOPHEN 325 MG/1
650 TABLET ORAL
Status: DISCONTINUED | OUTPATIENT
Start: 2020-01-01 | End: 2020-01-01

## 2020-01-01 RX ORDER — CEPHALEXIN 500 MG/1
500 CAPSULE ORAL 3 TIMES DAILY
Qty: 21 CAP | Refills: 0 | Status: SHIPPED | OUTPATIENT
Start: 2020-01-01 | End: 2020-01-01

## 2020-01-01 RX ORDER — SUCRALFATE 1 G/1
1 TABLET ORAL
Status: DISCONTINUED | OUTPATIENT
Start: 2020-01-01 | End: 2020-01-01

## 2020-01-01 RX ORDER — DEXAMETHASONE SODIUM PHOSPHATE 4 MG/ML
INJECTION, SOLUTION INTRA-ARTICULAR; INTRALESIONAL; INTRAMUSCULAR; INTRAVENOUS; SOFT TISSUE AS NEEDED
Status: DISCONTINUED | OUTPATIENT
Start: 2020-01-01 | End: 2020-01-01 | Stop reason: HOSPADM

## 2020-01-01 RX ORDER — HEPARIN SODIUM 5000 [USP'U]/ML
5000 INJECTION, SOLUTION INTRAVENOUS; SUBCUTANEOUS EVERY 8 HOURS
Status: DISCONTINUED | OUTPATIENT
Start: 2020-01-01 | End: 2020-01-01 | Stop reason: HOSPADM

## 2020-01-01 RX ORDER — POTASSIUM CHLORIDE 1500 MG/1
40 TABLET, FILM COATED, EXTENDED RELEASE ORAL DAILY
Qty: 30 TAB | Refills: 0 | Status: SHIPPED
Start: 2020-01-01 | End: 2020-01-01

## 2020-01-01 RX ORDER — HALOPERIDOL 2 MG/ML
1 SOLUTION ORAL
COMMUNITY
Start: 2020-01-01

## 2020-01-01 RX ORDER — AMOXICILLIN 250 MG
2 CAPSULE ORAL
Status: DISCONTINUED | OUTPATIENT
Start: 2020-01-01 | End: 2020-01-01 | Stop reason: HOSPADM

## 2020-01-01 RX ORDER — PROPOFOL 10 MG/ML
INJECTION, EMULSION INTRAVENOUS
Status: DISCONTINUED | OUTPATIENT
Start: 2020-01-01 | End: 2020-01-01 | Stop reason: HOSPADM

## 2020-01-01 RX ORDER — PROCHLORPERAZINE MALEATE 10 MG
10 TABLET ORAL
Status: DISCONTINUED | OUTPATIENT
Start: 2020-01-01 | End: 2020-01-01

## 2020-01-01 RX ORDER — MAGNESIUM SULFATE HEPTAHYDRATE 40 MG/ML
2 INJECTION, SOLUTION INTRAVENOUS ONCE
Status: COMPLETED | OUTPATIENT
Start: 2020-01-01 | End: 2020-01-01

## 2020-01-01 RX ORDER — DEXTROMETHORPHAN/PSEUDOEPHED 2.5-7.5/.8
1.2 DROPS ORAL
Status: DISCONTINUED | OUTPATIENT
Start: 2020-01-01 | End: 2020-01-01 | Stop reason: HOSPADM

## 2020-01-01 RX ORDER — MORPHINE SULFATE 2 MG/ML
2 INJECTION, SOLUTION INTRAMUSCULAR; INTRAVENOUS
Status: DISCONTINUED | OUTPATIENT
Start: 2020-01-01 | End: 2020-01-01

## 2020-01-01 RX ORDER — ONDANSETRON 2 MG/ML
4 INJECTION INTRAMUSCULAR; INTRAVENOUS
Status: DISCONTINUED | OUTPATIENT
Start: 2020-01-01 | End: 2020-01-01 | Stop reason: HOSPADM

## 2020-01-01 RX ORDER — MIDAZOLAM HYDROCHLORIDE 1 MG/ML
.25-5 INJECTION, SOLUTION INTRAMUSCULAR; INTRAVENOUS AS NEEDED
Status: DISCONTINUED | OUTPATIENT
Start: 2020-01-01 | End: 2020-01-01 | Stop reason: HOSPADM

## 2020-01-01 RX ORDER — GLYCOPYRROLATE 0.2 MG/ML
0.2 INJECTION INTRAMUSCULAR; INTRAVENOUS
Status: DISCONTINUED | OUTPATIENT
Start: 2020-01-01 | End: 2020-01-01

## 2020-01-01 RX ORDER — OXYCODONE HYDROCHLORIDE 5 MG/1
20 TABLET ORAL
Status: DISCONTINUED | OUTPATIENT
Start: 2020-01-01 | End: 2020-01-01 | Stop reason: SDUPTHER

## 2020-01-01 RX ORDER — FENTANYL CITRATE 50 UG/ML
INJECTION, SOLUTION INTRAMUSCULAR; INTRAVENOUS AS NEEDED
Status: DISCONTINUED | OUTPATIENT
Start: 2020-01-01 | End: 2020-01-01 | Stop reason: HOSPADM

## 2020-01-01 RX ORDER — TRAMADOL HYDROCHLORIDE 50 MG/1
50 TABLET ORAL
Status: DISCONTINUED | OUTPATIENT
Start: 2020-01-01 | End: 2020-01-01 | Stop reason: HOSPADM

## 2020-01-01 RX ORDER — AMLODIPINE BESYLATE 5 MG/1
10 TABLET ORAL DAILY
Status: DISCONTINUED | OUTPATIENT
Start: 2020-01-01 | End: 2020-01-01

## 2020-01-01 RX ORDER — PANTOPRAZOLE SODIUM 40 MG/1
40 TABLET, DELAYED RELEASE ORAL DAILY
Qty: 30 TAB | Refills: 0 | Status: SHIPPED
Start: 2020-01-01

## 2020-01-01 RX ORDER — POTASSIUM CHLORIDE 750 MG/1
40 TABLET, FILM COATED, EXTENDED RELEASE ORAL 2 TIMES DAILY
Status: COMPLETED | OUTPATIENT
Start: 2020-01-01 | End: 2020-01-01

## 2020-01-01 RX ORDER — POTASSIUM CHLORIDE 750 MG/1
10 TABLET, FILM COATED, EXTENDED RELEASE ORAL 2 TIMES DAILY
Status: DISCONTINUED | OUTPATIENT
Start: 2020-01-01 | End: 2020-01-01 | Stop reason: HOSPADM

## 2020-01-01 RX ORDER — MORPHINE SULFATE 2 MG/ML
2 INJECTION, SOLUTION INTRAMUSCULAR; INTRAVENOUS EVERY 4 HOURS
Status: DISCONTINUED | OUTPATIENT
Start: 2020-01-01 | End: 2020-01-01

## 2020-01-01 RX ORDER — SODIUM CHLORIDE, SODIUM LACTATE, POTASSIUM CHLORIDE, CALCIUM CHLORIDE 600; 310; 30; 20 MG/100ML; MG/100ML; MG/100ML; MG/100ML
100 INJECTION, SOLUTION INTRAVENOUS CONTINUOUS
Status: DISCONTINUED | OUTPATIENT
Start: 2020-01-01 | End: 2020-01-01

## 2020-01-01 RX ORDER — DEXTROSE, SODIUM CHLORIDE, AND POTASSIUM CHLORIDE 5; .45; .3 G/100ML; G/100ML; G/100ML
INJECTION INTRAVENOUS CONTINUOUS
Status: DISCONTINUED | OUTPATIENT
Start: 2020-01-01 | End: 2020-01-01

## 2020-01-01 RX ORDER — LORAZEPAM 2 MG/ML
0.5 CONCENTRATE ORAL
COMMUNITY
Start: 2020-01-01

## 2020-01-01 RX ORDER — PANTOPRAZOLE SODIUM 40 MG/1
40 TABLET, DELAYED RELEASE ORAL DAILY
Status: DISCONTINUED | OUTPATIENT
Start: 2020-01-01 | End: 2020-01-01 | Stop reason: HOSPADM

## 2020-01-01 RX ORDER — OXYCODONE HYDROCHLORIDE 5 MG/1
20 TABLET ORAL ONCE
Status: COMPLETED | OUTPATIENT
Start: 2020-01-01 | End: 2020-01-01

## 2020-01-01 RX ORDER — SODIUM CHLORIDE 9 MG/ML
150 INJECTION, SOLUTION INTRAVENOUS ONCE
Status: COMPLETED | OUTPATIENT
Start: 2020-01-01 | End: 2020-01-01

## 2020-01-01 RX ORDER — POTASSIUM CHLORIDE 750 MG/1
40 TABLET, FILM COATED, EXTENDED RELEASE ORAL
Status: COMPLETED | OUTPATIENT
Start: 2020-01-01 | End: 2020-01-01

## 2020-01-01 RX ADMIN — PHENYLEPHRINE HYDROCHLORIDE 40 MCG: 10 INJECTION INTRAVENOUS at 12:57

## 2020-01-01 RX ADMIN — MORPHINE SULFATE 2 MG: 2 INJECTION, SOLUTION INTRAMUSCULAR; INTRAVENOUS at 22:04

## 2020-01-01 RX ADMIN — QUETIAPINE FUMARATE 25 MG: 25 TABLET ORAL at 21:08

## 2020-01-01 RX ADMIN — SODIUM CHLORIDE 40 MG: 9 INJECTION INTRAMUSCULAR; INTRAVENOUS; SUBCUTANEOUS at 08:50

## 2020-01-01 RX ADMIN — FENTANYL CITRATE 50 MCG: 50 INJECTION INTRAMUSCULAR; INTRAVENOUS at 09:16

## 2020-01-01 RX ADMIN — SODIUM CHLORIDE 150 ML/HR: 900 INJECTION, SOLUTION INTRAVENOUS at 11:49

## 2020-01-01 RX ADMIN — SODIUM BICARBONATE 650 MG: 650 TABLET ORAL at 08:21

## 2020-01-01 RX ADMIN — POTASSIUM CHLORIDE 10 MEQ: 750 TABLET, FILM COATED, EXTENDED RELEASE ORAL at 09:12

## 2020-01-01 RX ADMIN — DEXTROSE MONOHYDRATE, SODIUM CHLORIDE, AND POTASSIUM CHLORIDE: 50; 4.5; 2.98 INJECTION, SOLUTION INTRAVENOUS at 08:46

## 2020-01-01 RX ADMIN — MORPHINE SULFATE 4 MG: 2 INJECTION, SOLUTION INTRAMUSCULAR; INTRAVENOUS at 10:19

## 2020-01-01 RX ADMIN — MORPHINE SULFATE 4 MG: 2 INJECTION, SOLUTION INTRAMUSCULAR; INTRAVENOUS at 07:10

## 2020-01-01 RX ADMIN — MORPHINE SULFATE 4 MG: 2 INJECTION, SOLUTION INTRAMUSCULAR; INTRAVENOUS at 19:52

## 2020-01-01 RX ADMIN — LABETALOL HYDROCHLORIDE 100 MG: 100 TABLET, FILM COATED ORAL at 08:17

## 2020-01-01 RX ADMIN — SODIUM BICARBONATE 650 MG: 650 TABLET ORAL at 09:52

## 2020-01-01 RX ADMIN — SODIUM BICARBONATE 650 MG: 650 TABLET ORAL at 09:30

## 2020-01-01 RX ADMIN — SODIUM BICARBONATE 650 MG: 650 TABLET ORAL at 18:32

## 2020-01-01 RX ADMIN — SUCRALFATE 1 G: 1 TABLET ORAL at 11:40

## 2020-01-01 RX ADMIN — INSULIN LISPRO 2 UNITS: 100 INJECTION, SOLUTION INTRAVENOUS; SUBCUTANEOUS at 12:30

## 2020-01-01 RX ADMIN — OXYCODONE 20 MG: 5 TABLET ORAL at 15:46

## 2020-01-01 RX ADMIN — PROPOFOL 150 MCG/KG/MIN: 10 INJECTION, EMULSION INTRAVENOUS at 15:20

## 2020-01-01 RX ADMIN — METRONIDAZOLE 500 MG: 500 INJECTION, SOLUTION INTRAVENOUS at 23:43

## 2020-01-01 RX ADMIN — MORPHINE SULFATE 2 MG: 2 INJECTION, SOLUTION INTRAMUSCULAR; INTRAVENOUS at 05:07

## 2020-01-01 RX ADMIN — HEPARIN SODIUM 5000 UNITS: 5000 INJECTION INTRAVENOUS; SUBCUTANEOUS at 01:17

## 2020-01-01 RX ADMIN — POTASSIUM CHLORIDE 10 MEQ: 750 TABLET, FILM COATED, EXTENDED RELEASE ORAL at 09:52

## 2020-01-01 RX ADMIN — ONDANSETRON 4 MG: 2 INJECTION INTRAMUSCULAR; INTRAVENOUS at 22:14

## 2020-01-01 RX ADMIN — SODIUM CHLORIDE 40 MG: 9 INJECTION INTRAMUSCULAR; INTRAVENOUS; SUBCUTANEOUS at 09:21

## 2020-01-01 RX ADMIN — Medication 10 ML: at 14:31

## 2020-01-01 RX ADMIN — SODIUM BICARBONATE 650 MG: 650 TABLET ORAL at 18:09

## 2020-01-01 RX ADMIN — Medication 10 ML: at 21:39

## 2020-01-01 RX ADMIN — HEPARIN SODIUM 5000 UNITS: 5000 INJECTION INTRAVENOUS; SUBCUTANEOUS at 14:47

## 2020-01-01 RX ADMIN — SODIUM BICARBONATE 650 MG: 650 TABLET ORAL at 17:37

## 2020-01-01 RX ADMIN — OXYCODONE 20 MG: 5 TABLET ORAL at 10:44

## 2020-01-01 RX ADMIN — MORPHINE SULFATE 1 MG: 2 INJECTION, SOLUTION INTRAMUSCULAR; INTRAVENOUS at 23:13

## 2020-01-01 RX ADMIN — SODIUM BICARBONATE 650 MG: 650 TABLET ORAL at 09:21

## 2020-01-01 RX ADMIN — DEXTROSE MONOHYDRATE, SODIUM CHLORIDE, AND POTASSIUM CHLORIDE: 50; 4.5; 2.98 INJECTION, SOLUTION INTRAVENOUS at 10:58

## 2020-01-01 RX ADMIN — HEPARIN SODIUM 5000 UNITS: 5000 INJECTION INTRAVENOUS; SUBCUTANEOUS at 05:21

## 2020-01-01 RX ADMIN — SODIUM BICARBONATE 650 MG: 650 TABLET ORAL at 09:06

## 2020-01-01 RX ADMIN — Medication 10 ML: at 22:00

## 2020-01-01 RX ADMIN — HYDROMORPHONE HYDROCHLORIDE 2 MG: 2 INJECTION INTRAMUSCULAR; INTRAVENOUS; SUBCUTANEOUS at 05:52

## 2020-01-01 RX ADMIN — Medication 10 ML: at 14:41

## 2020-01-01 RX ADMIN — GLYCOPYRROLATE 0.2 MG: 0.2 INJECTION, SOLUTION INTRAMUSCULAR; INTRAVENOUS at 23:47

## 2020-01-01 RX ADMIN — ASCORBIC ACID, VITAMIN A PALMITATE, CHOLECALCIFEROL, THIAMINE HYDROCHLORIDE, RIBOFLAVIN-5 PHOSPHATE SODIUM, PYRIDOXINE HYDROCHLORIDE, NIACINAMIDE, DEXPANTHENOL, ALPHA-TOCOPHEROL ACETATE, VITAMIN K1, FOLIC ACID, BIOTIN, CYANOCOBALAMIN: 200; 3300; 200; 6; 3.6; 6; 40; 15; 10; 150; 600; 60; 5 INJECTION, SOLUTION INTRAVENOUS at 17:48

## 2020-01-01 RX ADMIN — OXYCODONE HYDROCHLORIDE 20 MG: 100 SOLUTION ORAL at 16:32

## 2020-01-01 RX ADMIN — CEFAZOLIN SODIUM 2 G: 1 INJECTION, POWDER, FOR SOLUTION INTRAMUSCULAR; INTRAVENOUS at 09:20

## 2020-01-01 RX ADMIN — MORPHINE SULFATE 2 MG: 2 INJECTION, SOLUTION INTRAMUSCULAR; INTRAVENOUS at 21:32

## 2020-01-01 RX ADMIN — LABETALOL HYDROCHLORIDE 100 MG: 100 TABLET, FILM COATED ORAL at 22:12

## 2020-01-01 RX ADMIN — QUETIAPINE FUMARATE 25 MG: 25 TABLET ORAL at 21:17

## 2020-01-01 RX ADMIN — PHENYLEPHRINE HYDROCHLORIDE 40 MCG: 10 INJECTION INTRAVENOUS at 09:21

## 2020-01-01 RX ADMIN — HEPARIN SODIUM 5000 UNITS: 5000 INJECTION INTRAVENOUS; SUBCUTANEOUS at 05:38

## 2020-01-01 RX ADMIN — SODIUM BICARBONATE 650 MG: 650 TABLET ORAL at 19:05

## 2020-01-01 RX ADMIN — MORPHINE SULFATE 4 MG: 2 INJECTION, SOLUTION INTRAMUSCULAR; INTRAVENOUS at 08:37

## 2020-01-01 RX ADMIN — TRAMADOL HYDROCHLORIDE 50 MG: 50 TABLET, FILM COATED ORAL at 14:50

## 2020-01-01 RX ADMIN — LABETALOL HYDROCHLORIDE 100 MG: 100 TABLET, FILM COATED ORAL at 08:34

## 2020-01-01 RX ADMIN — POTASSIUM CHLORIDE 40 MEQ: 750 TABLET, FILM COATED, EXTENDED RELEASE ORAL at 09:21

## 2020-01-01 RX ADMIN — POTASSIUM CHLORIDE 10 MEQ: 750 TABLET, FILM COATED, EXTENDED RELEASE ORAL at 16:56

## 2020-01-01 RX ADMIN — SODIUM CHLORIDE 50 ML/HR: 900 INJECTION, SOLUTION INTRAVENOUS at 15:03

## 2020-01-01 RX ADMIN — MORPHINE SULFATE 2 MG: 2 INJECTION, SOLUTION INTRAMUSCULAR; INTRAVENOUS at 01:24

## 2020-01-01 RX ADMIN — GLYCOPYRROLATE 0.2 MG: 0.2 INJECTION, SOLUTION INTRAMUSCULAR; INTRAVENOUS at 23:17

## 2020-01-01 RX ADMIN — ASCORBIC ACID, VITAMIN A PALMITATE, CHOLECALCIFEROL, THIAMINE HYDROCHLORIDE, RIBOFLAVIN-5 PHOSPHATE SODIUM, PYRIDOXINE HYDROCHLORIDE, NIACINAMIDE, DEXPANTHENOL, ALPHA-TOCOPHEROL ACETATE, VITAMIN K1, FOLIC ACID, BIOTIN, CYANOCOBALAMIN: 200; 3300; 200; 6; 3.6; 6; 40; 15; 10; 150; 600; 60; 5 INJECTION, SOLUTION INTRAVENOUS at 18:25

## 2020-01-01 RX ADMIN — HEPARIN SODIUM 5000 UNITS: 5000 INJECTION INTRAVENOUS; SUBCUTANEOUS at 21:07

## 2020-01-01 RX ADMIN — Medication 10 ML: at 06:00

## 2020-01-01 RX ADMIN — POTASSIUM PHOSPHATE, MONOBASIC AND POTASSIUM PHOSPHATE, DIBASIC: 224; 236 INJECTION, SOLUTION, CONCENTRATE INTRAVENOUS at 10:29

## 2020-01-01 RX ADMIN — LORAZEPAM 0.5 MG: 2 SOLUTION, CONCENTRATE ORAL at 13:55

## 2020-01-01 RX ADMIN — SODIUM BICARBONATE 650 MG: 650 TABLET ORAL at 17:58

## 2020-01-01 RX ADMIN — SODIUM BICARBONATE 650 MG: 650 TABLET ORAL at 08:42

## 2020-01-01 RX ADMIN — HEPARIN SODIUM 5000 UNITS: 5000 INJECTION INTRAVENOUS; SUBCUTANEOUS at 06:33

## 2020-01-01 RX ADMIN — CEFEPIME 2 G: 20 INJECTION, POWDER, FOR SOLUTION INTRAVENOUS at 01:55

## 2020-01-01 RX ADMIN — METRONIDAZOLE 500 MG: 500 INJECTION, SOLUTION INTRAVENOUS at 10:58

## 2020-01-01 RX ADMIN — ACETAMINOPHEN 650 MG: 325 TABLET ORAL at 04:34

## 2020-01-01 RX ADMIN — SODIUM BICARBONATE 650 MG: 650 TABLET ORAL at 18:47

## 2020-01-01 RX ADMIN — HEPARIN SODIUM 5000 UNITS: 5000 INJECTION INTRAVENOUS; SUBCUTANEOUS at 14:24

## 2020-01-01 RX ADMIN — SODIUM BICARBONATE 650 MG: 650 TABLET ORAL at 16:56

## 2020-01-01 RX ADMIN — SODIUM CHLORIDE: 900 INJECTION, SOLUTION INTRAVENOUS at 09:09

## 2020-01-01 RX ADMIN — SODIUM BICARBONATE 650 MG: 650 TABLET ORAL at 17:05

## 2020-01-01 RX ADMIN — PROPOFOL 100 MG: 10 INJECTION, EMULSION INTRAVENOUS at 15:20

## 2020-01-01 RX ADMIN — SUCRALFATE 1 G: 1 TABLET ORAL at 18:04

## 2020-01-01 RX ADMIN — PHENYLEPHRINE HYDROCHLORIDE 80 MCG: 10 INJECTION INTRAVENOUS at 07:49

## 2020-01-01 RX ADMIN — Medication 10 ML: at 05:12

## 2020-01-01 RX ADMIN — QUETIAPINE FUMARATE 25 MG: 25 TABLET ORAL at 21:00

## 2020-01-01 RX ADMIN — ONDANSETRON 4 MG: 2 INJECTION INTRAMUSCULAR; INTRAVENOUS at 02:13

## 2020-01-01 RX ADMIN — CEFEPIME 2 G: 20 INJECTION, POWDER, FOR SOLUTION INTRAVENOUS at 15:31

## 2020-01-01 RX ADMIN — HEPARIN SODIUM 5000 UNITS: 5000 INJECTION INTRAVENOUS; SUBCUTANEOUS at 05:58

## 2020-01-01 RX ADMIN — HYDROMORPHONE HYDROCHLORIDE 0.5 MG: 1 INJECTION, SOLUTION INTRAMUSCULAR; INTRAVENOUS; SUBCUTANEOUS at 10:54

## 2020-01-01 RX ADMIN — SODIUM BICARBONATE 650 MG: 650 TABLET ORAL at 08:08

## 2020-01-01 RX ADMIN — TRAMADOL HYDROCHLORIDE 50 MG: 50 TABLET, FILM COATED ORAL at 08:08

## 2020-01-01 RX ADMIN — SODIUM BICARBONATE 650 MG: 650 TABLET ORAL at 09:11

## 2020-01-01 RX ADMIN — OXYCODONE 20 MG: 5 TABLET ORAL at 03:03

## 2020-01-01 RX ADMIN — POTASSIUM CHLORIDE 10 MEQ: 10 INJECTION, SOLUTION INTRAVENOUS at 10:50

## 2020-01-01 RX ADMIN — LABETALOL HYDROCHLORIDE 100 MG: 100 TABLET, FILM COATED ORAL at 21:07

## 2020-01-01 RX ADMIN — ACETAMINOPHEN 650 MG: 325 TABLET ORAL at 03:45

## 2020-01-01 RX ADMIN — SODIUM CHLORIDE 40 MG: 9 INJECTION INTRAMUSCULAR; INTRAVENOUS; SUBCUTANEOUS at 08:21

## 2020-01-01 RX ADMIN — GLYCOPYRROLATE 0.3 MG: 0.2 INJECTION, SOLUTION INTRAMUSCULAR; INTRAVENOUS at 12:46

## 2020-01-01 RX ADMIN — Medication 10 ML: at 22:54

## 2020-01-01 RX ADMIN — HEPARIN SODIUM 5000 UNITS: 5000 INJECTION INTRAVENOUS; SUBCUTANEOUS at 22:12

## 2020-01-01 RX ADMIN — SUCRALFATE 1 G: 1 TABLET ORAL at 16:58

## 2020-01-01 RX ADMIN — HYDROMORPHONE HYDROCHLORIDE 2 MG: 2 INJECTION INTRAMUSCULAR; INTRAVENOUS; SUBCUTANEOUS at 18:45

## 2020-01-01 RX ADMIN — POTASSIUM CHLORIDE 40 MEQ: 750 TABLET, FILM COATED, EXTENDED RELEASE ORAL at 09:11

## 2020-01-01 RX ADMIN — MORPHINE SULFATE 2 MG: 2 INJECTION, SOLUTION INTRAMUSCULAR; INTRAVENOUS at 17:45

## 2020-01-01 RX ADMIN — LABETALOL HYDROCHLORIDE 100 MG: 100 TABLET, FILM COATED ORAL at 21:06

## 2020-01-01 RX ADMIN — LABETALOL HYDROCHLORIDE 100 MG: 100 TABLET, FILM COATED ORAL at 21:03

## 2020-01-01 RX ADMIN — METRONIDAZOLE 500 MG: 500 INJECTION, SOLUTION INTRAVENOUS at 08:30

## 2020-01-01 RX ADMIN — DEXTROSE MONOHYDRATE, SODIUM CHLORIDE, AND POTASSIUM CHLORIDE: 50; 4.5; 2.98 INJECTION, SOLUTION INTRAVENOUS at 18:53

## 2020-01-01 RX ADMIN — HYDROMORPHONE HYDROCHLORIDE 0.5 MG: 1 INJECTION, SOLUTION INTRAMUSCULAR; INTRAVENOUS; SUBCUTANEOUS at 18:43

## 2020-01-01 RX ADMIN — PROPOFOL 150 MG: 10 INJECTION, EMULSION INTRAVENOUS at 08:09

## 2020-01-01 RX ADMIN — METRONIDAZOLE 500 MG: 500 INJECTION, SOLUTION INTRAVENOUS at 22:12

## 2020-01-01 RX ADMIN — LABETALOL HYDROCHLORIDE 100 MG: 100 TABLET, FILM COATED ORAL at 08:08

## 2020-01-01 RX ADMIN — PHENYLEPHRINE HYDROCHLORIDE 40 MCG: 10 INJECTION INTRAVENOUS at 09:38

## 2020-01-01 RX ADMIN — POTASSIUM CHLORIDE 10 MEQ: 10 INJECTION, SOLUTION INTRAVENOUS at 13:37

## 2020-01-01 RX ADMIN — HYDROMORPHONE HYDROCHLORIDE 2 MG: 2 INJECTION INTRAMUSCULAR; INTRAVENOUS; SUBCUTANEOUS at 23:24

## 2020-01-01 RX ADMIN — LABETALOL HYDROCHLORIDE 100 MG: 100 TABLET, FILM COATED ORAL at 21:53

## 2020-01-01 RX ADMIN — GLYCOPYRROLATE 0.2 MG: 0.2 INJECTION, SOLUTION INTRAMUSCULAR; INTRAVENOUS at 07:27

## 2020-01-01 RX ADMIN — PHENYLEPHRINE HYDROCHLORIDE 80 MCG: 10 INJECTION INTRAVENOUS at 09:30

## 2020-01-01 RX ADMIN — CEFEPIME 2 G: 20 INJECTION, POWDER, FOR SOLUTION INTRAVENOUS at 17:58

## 2020-01-01 RX ADMIN — LABETALOL HYDROCHLORIDE 100 MG: 100 TABLET, FILM COATED ORAL at 21:38

## 2020-01-01 RX ADMIN — METRONIDAZOLE 500 MG: 500 INJECTION, SOLUTION INTRAVENOUS at 00:52

## 2020-01-01 RX ADMIN — ONDANSETRON 4 MG: 2 INJECTION INTRAMUSCULAR; INTRAVENOUS at 11:36

## 2020-01-01 RX ADMIN — FENTANYL CITRATE 25 MCG: 50 INJECTION INTRAMUSCULAR; INTRAVENOUS at 12:50

## 2020-01-01 RX ADMIN — HALOPERIDOL LACTATE 2 MG: 5 INJECTION, SOLUTION INTRAMUSCULAR at 07:24

## 2020-01-01 RX ADMIN — METRONIDAZOLE 500 MG: 500 INJECTION, SOLUTION INTRAVENOUS at 18:21

## 2020-01-01 RX ADMIN — ASPIRIN 81 MG: 81 TABLET, COATED ORAL at 10:14

## 2020-01-01 RX ADMIN — HEPARIN SODIUM 5000 UNITS: 5000 INJECTION INTRAVENOUS; SUBCUTANEOUS at 13:36

## 2020-01-01 RX ADMIN — LABETALOL HYDROCHLORIDE 100 MG: 100 TABLET, FILM COATED ORAL at 21:00

## 2020-01-01 RX ADMIN — AMLODIPINE BESYLATE 10 MG: 5 TABLET ORAL at 08:28

## 2020-01-01 RX ADMIN — DEXAMETHASONE SODIUM PHOSPHATE 4 MG: 4 INJECTION, SOLUTION INTRAMUSCULAR; INTRAVENOUS at 08:50

## 2020-01-01 RX ADMIN — SODIUM CHLORIDE 40 MG: 9 INJECTION INTRAMUSCULAR; INTRAVENOUS; SUBCUTANEOUS at 08:49

## 2020-01-01 RX ADMIN — SODIUM BICARBONATE 650 MG: 650 TABLET ORAL at 17:16

## 2020-01-01 RX ADMIN — SUCRALFATE 1 G: 1 TABLET ORAL at 22:12

## 2020-01-01 RX ADMIN — HEPARIN SODIUM 5000 UNITS: 5000 INJECTION INTRAVENOUS; SUBCUTANEOUS at 00:44

## 2020-01-01 RX ADMIN — I.V. FAT EMULSION 500 ML: 20 EMULSION INTRAVENOUS at 19:09

## 2020-01-01 RX ADMIN — ONDANSETRON 4 MG: 2 INJECTION INTRAMUSCULAR; INTRAVENOUS at 12:50

## 2020-01-01 RX ADMIN — SUCRALFATE 1 G: 1 TABLET ORAL at 21:43

## 2020-01-01 RX ADMIN — HEPARIN SODIUM 5000 UNITS: 5000 INJECTION INTRAVENOUS; SUBCUTANEOUS at 05:27

## 2020-01-01 RX ADMIN — LABETALOL HYDROCHLORIDE 100 MG: 100 TABLET, FILM COATED ORAL at 21:14

## 2020-01-01 RX ADMIN — Medication 10 ML: at 13:45

## 2020-01-01 RX ADMIN — Medication 10 ML: at 14:04

## 2020-01-01 RX ADMIN — HYDROMORPHONE HYDROCHLORIDE 1 MG: 1 INJECTION, SOLUTION INTRAMUSCULAR; INTRAVENOUS; SUBCUTANEOUS at 13:47

## 2020-01-01 RX ADMIN — METRONIDAZOLE 500 MG: 500 INJECTION, SOLUTION INTRAVENOUS at 23:55

## 2020-01-01 RX ADMIN — HEPARIN SODIUM 5000 UNITS: 5000 INJECTION INTRAVENOUS; SUBCUTANEOUS at 21:36

## 2020-01-01 RX ADMIN — VECURONIUM BROMIDE 2 MG: 1 INJECTION, POWDER, LYOPHILIZED, FOR SOLUTION INTRAVENOUS at 09:58

## 2020-01-01 RX ADMIN — HEPARIN SODIUM 5000 UNITS: 5000 INJECTION INTRAVENOUS; SUBCUTANEOUS at 21:39

## 2020-01-01 RX ADMIN — MORPHINE SULFATE 2 MG: 2 INJECTION, SOLUTION INTRAMUSCULAR; INTRAVENOUS at 04:10

## 2020-01-01 RX ADMIN — Medication 10 ML: at 13:40

## 2020-01-01 RX ADMIN — ONDANSETRON 4 MG: 2 INJECTION INTRAMUSCULAR; INTRAVENOUS at 16:57

## 2020-01-01 RX ADMIN — ASPIRIN 81 MG: 81 TABLET, COATED ORAL at 09:45

## 2020-01-01 RX ADMIN — LABETALOL HYDROCHLORIDE 100 MG: 100 TABLET, FILM COATED ORAL at 22:54

## 2020-01-01 RX ADMIN — SUCRALFATE 1 G: 1 TABLET ORAL at 16:57

## 2020-01-01 RX ADMIN — LABETALOL HYDROCHLORIDE 100 MG: 100 TABLET, FILM COATED ORAL at 09:12

## 2020-01-01 RX ADMIN — LABETALOL HYDROCHLORIDE 100 MG: 100 TABLET, FILM COATED ORAL at 08:49

## 2020-01-01 RX ADMIN — CEFEPIME 2 G: 20 INJECTION, POWDER, FOR SOLUTION INTRAVENOUS at 03:27

## 2020-01-01 RX ADMIN — HEPARIN SODIUM 5000 UNITS: 5000 INJECTION INTRAVENOUS; SUBCUTANEOUS at 22:04

## 2020-01-01 RX ADMIN — ASCORBIC ACID, VITAMIN A PALMITATE, CHOLECALCIFEROL, THIAMINE HYDROCHLORIDE, RIBOFLAVIN-5 PHOSPHATE SODIUM, PYRIDOXINE HYDROCHLORIDE, NIACINAMIDE, DEXPANTHENOL, ALPHA-TOCOPHEROL ACETATE, VITAMIN K1, FOLIC ACID, BIOTIN, CYANOCOBALAMIN: 200; 3300; 200; 6; 3.6; 6; 40; 15; 10; 150; 600; 60; 5 INJECTION, SOLUTION INTRAVENOUS at 18:05

## 2020-01-01 RX ADMIN — SODIUM BICARBONATE 650 MG: 650 TABLET ORAL at 08:46

## 2020-01-01 RX ADMIN — GLYCOPYRROLATE 0.2 MG: 0.2 INJECTION, SOLUTION INTRAMUSCULAR; INTRAVENOUS at 07:07

## 2020-01-01 RX ADMIN — DEXTROSE MONOHYDRATE, SODIUM CHLORIDE, AND POTASSIUM CHLORIDE: 50; 4.5; 2.98 INJECTION, SOLUTION INTRAVENOUS at 06:32

## 2020-01-01 RX ADMIN — MORPHINE SULFATE 2 MG: 2 INJECTION, SOLUTION INTRAMUSCULAR; INTRAVENOUS at 13:12

## 2020-01-01 RX ADMIN — POTASSIUM CHLORIDE 40 MEQ: 750 TABLET, FILM COATED, EXTENDED RELEASE ORAL at 18:29

## 2020-01-01 RX ADMIN — OXYCODONE 20 MG: 5 TABLET ORAL at 13:56

## 2020-01-01 RX ADMIN — POTASSIUM CHLORIDE 10 MEQ: 200 INJECTION, SOLUTION INTRAVENOUS at 08:55

## 2020-01-01 RX ADMIN — AMLODIPINE BESYLATE 5 MG: 5 TABLET ORAL at 11:55

## 2020-01-01 RX ADMIN — Medication 10 ML: at 14:46

## 2020-01-01 RX ADMIN — I.V. FAT EMULSION 500 ML: 20 EMULSION INTRAVENOUS at 18:13

## 2020-01-01 RX ADMIN — GLYCOPYRROLATE 0.2 MG: 0.2 INJECTION, SOLUTION INTRAMUSCULAR; INTRAVENOUS at 04:00

## 2020-01-01 RX ADMIN — GLYCOPYRROLATE 0.2 MG: 0.2 INJECTION, SOLUTION INTRAMUSCULAR; INTRAVENOUS at 23:24

## 2020-01-01 RX ADMIN — SODIUM BICARBONATE 650 MG: 650 TABLET ORAL at 11:02

## 2020-01-01 RX ADMIN — HYDROMORPHONE HYDROCHLORIDE 0.5 MG: 1 INJECTION, SOLUTION INTRAMUSCULAR; INTRAVENOUS; SUBCUTANEOUS at 13:30

## 2020-01-01 RX ADMIN — GLYCOPYRROLATE 0.2 MG: 0.2 INJECTION, SOLUTION INTRAMUSCULAR; INTRAVENOUS at 12:47

## 2020-01-01 RX ADMIN — ONDANSETRON 4 MG: 2 INJECTION INTRAMUSCULAR; INTRAVENOUS at 15:31

## 2020-01-01 RX ADMIN — LIDOCAINE HYDROCHLORIDE 40 MG: 20 INJECTION, SOLUTION INTRAVENOUS at 15:20

## 2020-01-01 RX ADMIN — Medication 10 ML: at 05:58

## 2020-01-01 RX ADMIN — HEPARIN SODIUM 5000 UNITS: 5000 INJECTION INTRAVENOUS; SUBCUTANEOUS at 05:07

## 2020-01-01 RX ADMIN — FENTANYL CITRATE 50 MCG: 50 INJECTION INTRAMUSCULAR; INTRAVENOUS at 09:59

## 2020-01-01 RX ADMIN — Medication 10 ML: at 00:52

## 2020-01-01 RX ADMIN — MORPHINE SULFATE 2 MG: 2 INJECTION, SOLUTION INTRAMUSCULAR; INTRAVENOUS at 08:49

## 2020-01-01 RX ADMIN — OXYCODONE HYDROCHLORIDE 40 MG: 20 TABLET, FILM COATED, EXTENDED RELEASE ORAL at 10:57

## 2020-01-01 RX ADMIN — HEPARIN SODIUM 5000 UNITS: 5000 INJECTION INTRAVENOUS; SUBCUTANEOUS at 13:57

## 2020-01-01 RX ADMIN — POTASSIUM CHLORIDE 10 MEQ: 750 TABLET, FILM COATED, EXTENDED RELEASE ORAL at 08:33

## 2020-01-01 RX ADMIN — FENTANYL CITRATE 25 MCG: 50 INJECTION INTRAMUSCULAR; INTRAVENOUS at 08:04

## 2020-01-01 RX ADMIN — HYDROMORPHONE HYDROCHLORIDE 2 MG: 2 INJECTION INTRAMUSCULAR; INTRAVENOUS; SUBCUTANEOUS at 19:46

## 2020-01-01 RX ADMIN — SODIUM BICARBONATE 650 MG: 650 TABLET ORAL at 08:33

## 2020-01-01 RX ADMIN — QUETIAPINE FUMARATE 25 MG: 25 TABLET ORAL at 21:53

## 2020-01-01 RX ADMIN — LORAZEPAM 0.5 MG: 2 SOLUTION, CONCENTRATE ORAL at 10:42

## 2020-01-01 RX ADMIN — MORPHINE SULFATE 2 MG: 2 INJECTION, SOLUTION INTRAMUSCULAR; INTRAVENOUS at 05:21

## 2020-01-01 RX ADMIN — VECURONIUM BROMIDE 3 MG: 1 INJECTION, POWDER, LYOPHILIZED, FOR SOLUTION INTRAVENOUS at 08:53

## 2020-01-01 RX ADMIN — MORPHINE SULFATE 4 MG: 2 INJECTION, SOLUTION INTRAMUSCULAR; INTRAVENOUS at 16:44

## 2020-01-01 RX ADMIN — MORPHINE SULFATE 2 MG: 2 INJECTION, SOLUTION INTRAMUSCULAR; INTRAVENOUS at 23:56

## 2020-01-01 RX ADMIN — CEFEPIME 2 G: 20 INJECTION, POWDER, FOR SOLUTION INTRAVENOUS at 16:30

## 2020-01-01 RX ADMIN — SODIUM CHLORIDE, POTASSIUM CHLORIDE, SODIUM LACTATE AND CALCIUM CHLORIDE: 600; 310; 30; 20 INJECTION, SOLUTION INTRAVENOUS at 08:45

## 2020-01-01 RX ADMIN — POTASSIUM PHOSPHATE, MONOBASIC AND POTASSIUM PHOSPHATE, DIBASIC: 224; 236 INJECTION, SOLUTION, CONCENTRATE INTRAVENOUS at 10:08

## 2020-01-01 RX ADMIN — TRAMADOL HYDROCHLORIDE 50 MG: 50 TABLET, FILM COATED ORAL at 12:38

## 2020-01-01 RX ADMIN — PHENYLEPHRINE HYDROCHLORIDE 40 MCG: 10 INJECTION INTRAVENOUS at 08:33

## 2020-01-01 RX ADMIN — MORPHINE SULFATE 2 MG: 2 INJECTION, SOLUTION INTRAMUSCULAR; INTRAVENOUS at 20:55

## 2020-01-01 RX ADMIN — FAMOTIDINE 20 MG: 10 INJECTION, SOLUTION INTRAVENOUS at 09:00

## 2020-01-01 RX ADMIN — LIDOCAINE HYDROCHLORIDE 60 MG: 20 INJECTION, SOLUTION INTRAVENOUS at 08:08

## 2020-01-01 RX ADMIN — Medication 10 ML: at 21:06

## 2020-01-01 RX ADMIN — GLYCOPYRROLATE 0.2 MG: 0.2 INJECTION, SOLUTION INTRAMUSCULAR; INTRAVENOUS at 16:45

## 2020-01-01 RX ADMIN — MORPHINE SULFATE 2 MG: 2 INJECTION, SOLUTION INTRAMUSCULAR; INTRAVENOUS at 23:17

## 2020-01-01 RX ADMIN — GLYCOPYRROLATE 0.2 MG: 0.2 INJECTION, SOLUTION INTRAMUSCULAR; INTRAVENOUS at 17:45

## 2020-01-01 RX ADMIN — HYDROMORPHONE HYDROCHLORIDE 0.5 MG: 1 INJECTION, SOLUTION INTRAMUSCULAR; INTRAVENOUS; SUBCUTANEOUS at 09:05

## 2020-01-01 RX ADMIN — Medication 10 ML: at 22:12

## 2020-01-01 RX ADMIN — CEFEPIME 2 G: 20 INJECTION, POWDER, FOR SOLUTION INTRAVENOUS at 03:28

## 2020-01-01 RX ADMIN — SODIUM CHLORIDE 40 MG: 9 INJECTION INTRAMUSCULAR; INTRAVENOUS; SUBCUTANEOUS at 08:07

## 2020-01-01 RX ADMIN — SODIUM BICARBONATE 650 MG: 650 TABLET ORAL at 18:29

## 2020-01-01 RX ADMIN — SUCRALFATE 1 G: 1 TABLET ORAL at 10:16

## 2020-01-01 RX ADMIN — MORPHINE SULFATE 2 MG: 2 INJECTION, SOLUTION INTRAMUSCULAR; INTRAVENOUS at 07:01

## 2020-01-01 RX ADMIN — POTASSIUM CHLORIDE 10 MEQ: 10 INJECTION, SOLUTION INTRAVENOUS at 06:03

## 2020-01-01 RX ADMIN — IOPAMIDOL 100 ML: 755 INJECTION, SOLUTION INTRAVENOUS at 11:00

## 2020-01-01 RX ADMIN — ROCURONIUM BROMIDE 5 MG: 50 INJECTION, SOLUTION INTRAVENOUS at 08:08

## 2020-01-01 RX ADMIN — HEPARIN SODIUM 5000 UNITS: 5000 INJECTION INTRAVENOUS; SUBCUTANEOUS at 06:48

## 2020-01-01 RX ADMIN — POTASSIUM CHLORIDE 40 MEQ: 750 TABLET, FILM COATED, EXTENDED RELEASE ORAL at 13:08

## 2020-01-01 RX ADMIN — HEPARIN SODIUM 5000 UNITS: 5000 INJECTION INTRAVENOUS; SUBCUTANEOUS at 21:41

## 2020-01-01 RX ADMIN — SUCRALFATE 1 G: 1 TABLET ORAL at 21:38

## 2020-01-01 RX ADMIN — CEFEPIME 2 G: 20 INJECTION, POWDER, FOR SOLUTION INTRAVENOUS at 03:51

## 2020-01-01 RX ADMIN — SODIUM CHLORIDE, SODIUM LACTATE, POTASSIUM CHLORIDE, AND CALCIUM CHLORIDE 100 ML/HR: 600; 310; 30; 20 INJECTION, SOLUTION INTRAVENOUS at 01:56

## 2020-01-01 RX ADMIN — WATER 1 G: 1 INJECTION INTRAMUSCULAR; INTRAVENOUS; SUBCUTANEOUS at 13:10

## 2020-01-01 RX ADMIN — SUCRALFATE 1 G: 1 TABLET ORAL at 05:57

## 2020-01-01 RX ADMIN — SODIUM CHLORIDE: 9 INJECTION, SOLUTION INTRAVENOUS at 15:16

## 2020-01-01 RX ADMIN — PROPOFOL 30 MG: 10 INJECTION, EMULSION INTRAVENOUS at 08:10

## 2020-01-01 RX ADMIN — SUCRALFATE 1 G: 1 TABLET ORAL at 23:03

## 2020-01-01 RX ADMIN — HEPARIN SODIUM 5000 UNITS: 5000 INJECTION INTRAVENOUS; SUBCUTANEOUS at 06:00

## 2020-01-01 RX ADMIN — SUCRALFATE 1 G: 1 TABLET ORAL at 06:31

## 2020-01-01 RX ADMIN — Medication 20 ML: at 14:00

## 2020-01-01 RX ADMIN — SODIUM CHLORIDE 100 ML/HR: 900 INJECTION, SOLUTION INTRAVENOUS at 01:09

## 2020-01-01 RX ADMIN — LABETALOL 20 MG/4 ML (5 MG/ML) INTRAVENOUS SYRINGE 10 MG: at 10:49

## 2020-01-01 RX ADMIN — Medication 10 ML: at 05:27

## 2020-01-01 RX ADMIN — MORPHINE SULFATE 2 MG: 2 INJECTION, SOLUTION INTRAMUSCULAR; INTRAVENOUS at 08:17

## 2020-01-01 RX ADMIN — HYDROMORPHONE HYDROCHLORIDE 2 MG: 2 INJECTION INTRAMUSCULAR; INTRAVENOUS; SUBCUTANEOUS at 04:00

## 2020-01-01 RX ADMIN — SUCCINYLCHOLINE CHLORIDE 100 MG: 20 INJECTION, SOLUTION INTRAMUSCULAR; INTRAVENOUS; PARENTERAL at 08:09

## 2020-01-01 RX ADMIN — OXYCODONE 20 MG: 5 TABLET ORAL at 10:43

## 2020-01-01 RX ADMIN — Medication 10 ML: at 23:55

## 2020-01-01 RX ADMIN — LABETALOL 20 MG/4 ML (5 MG/ML) INTRAVENOUS SYRINGE 10 MG: at 17:09

## 2020-01-01 RX ADMIN — MORPHINE SULFATE 1 MG: 2 INJECTION, SOLUTION INTRAMUSCULAR; INTRAVENOUS at 11:36

## 2020-01-01 RX ADMIN — MORPHINE SULFATE 2 MG: 2 INJECTION, SOLUTION INTRAMUSCULAR; INTRAVENOUS at 19:17

## 2020-01-01 RX ADMIN — Medication 2.5 MG: at 12:46

## 2020-01-01 RX ADMIN — SODIUM CHLORIDE 40 MG: 9 INJECTION INTRAMUSCULAR; INTRAVENOUS; SUBCUTANEOUS at 08:28

## 2020-01-01 RX ADMIN — LABETALOL HYDROCHLORIDE 100 MG: 100 TABLET, FILM COATED ORAL at 10:57

## 2020-01-01 RX ADMIN — AMLODIPINE BESYLATE 10 MG: 5 TABLET ORAL at 09:45

## 2020-01-01 RX ADMIN — ASCORBIC ACID, VITAMIN A PALMITATE, CHOLECALCIFEROL, THIAMINE HYDROCHLORIDE, RIBOFLAVIN-5 PHOSPHATE SODIUM, PYRIDOXINE HYDROCHLORIDE, NIACINAMIDE, DEXPANTHENOL, ALPHA-TOCOPHEROL ACETATE, VITAMIN K1, FOLIC ACID, BIOTIN, CYANOCOBALAMIN: 200; 3300; 200; 6; 3.6; 6; 40; 15; 10; 150; 600; 60; 5 INJECTION, SOLUTION INTRAVENOUS at 17:40

## 2020-01-01 RX ADMIN — POTASSIUM CHLORIDE 10 MEQ: 200 INJECTION, SOLUTION INTRAVENOUS at 10:08

## 2020-01-01 RX ADMIN — SODIUM CHLORIDE 40 MG: 9 INJECTION INTRAMUSCULAR; INTRAVENOUS; SUBCUTANEOUS at 10:15

## 2020-01-01 RX ADMIN — FENTANYL CITRATE 50 MCG: 50 INJECTION INTRAMUSCULAR; INTRAVENOUS at 08:09

## 2020-01-01 RX ADMIN — POTASSIUM CHLORIDE 10 MEQ: 200 INJECTION, SOLUTION INTRAVENOUS at 04:48

## 2020-01-01 RX ADMIN — POTASSIUM CHLORIDE 10 MEQ: 750 TABLET, FILM COATED, EXTENDED RELEASE ORAL at 17:05

## 2020-01-01 RX ADMIN — LABETALOL HYDROCHLORIDE 100 MG: 100 TABLET, FILM COATED ORAL at 21:41

## 2020-01-01 RX ADMIN — HEPARIN SODIUM 5000 UNITS: 5000 INJECTION INTRAVENOUS; SUBCUTANEOUS at 22:53

## 2020-01-01 RX ADMIN — VECURONIUM BROMIDE 2 MG: 1 INJECTION, POWDER, LYOPHILIZED, FOR SOLUTION INTRAVENOUS at 09:13

## 2020-01-01 RX ADMIN — LABETALOL HYDROCHLORIDE 100 MG: 100 TABLET, FILM COATED ORAL at 09:45

## 2020-01-01 RX ADMIN — HEPARIN SODIUM 5000 UNITS: 5000 INJECTION INTRAVENOUS; SUBCUTANEOUS at 13:30

## 2020-01-01 RX ADMIN — POTASSIUM CHLORIDE 10 MEQ: 200 INJECTION, SOLUTION INTRAVENOUS at 07:50

## 2020-01-01 RX ADMIN — SODIUM BICARBONATE 650 MG: 650 TABLET ORAL at 17:07

## 2020-01-01 RX ADMIN — POTASSIUM CHLORIDE 10 MEQ: 750 TABLET, FILM COATED, EXTENDED RELEASE ORAL at 18:08

## 2020-01-01 RX ADMIN — ASCORBIC ACID, VITAMIN A PALMITATE, CHOLECALCIFEROL, THIAMINE HYDROCHLORIDE, RIBOFLAVIN-5 PHOSPHATE SODIUM, PYRIDOXINE HYDROCHLORIDE, NIACINAMIDE, DEXPANTHENOL, ALPHA-TOCOPHEROL ACETATE, VITAMIN K1, FOLIC ACID, BIOTIN, CYANOCOBALAMIN: 200; 3300; 200; 6; 3.6; 6; 40; 15; 10; 150; 600; 60; 5 INJECTION, SOLUTION INTRAVENOUS at 18:00

## 2020-01-01 RX ADMIN — POTASSIUM CHLORIDE 40 MEQ: 750 TABLET, FILM COATED, EXTENDED RELEASE ORAL at 17:37

## 2020-01-01 RX ADMIN — SODIUM CHLORIDE, SODIUM LACTATE, POTASSIUM CHLORIDE, AND CALCIUM CHLORIDE 100 ML/HR: 600; 310; 30; 20 INJECTION, SOLUTION INTRAVENOUS at 09:01

## 2020-01-01 RX ADMIN — PANTOPRAZOLE SODIUM 40 MG: 40 TABLET, DELAYED RELEASE ORAL at 09:30

## 2020-01-01 RX ADMIN — PHENYLEPHRINE HYDROCHLORIDE 80 MCG: 10 INJECTION INTRAVENOUS at 08:41

## 2020-01-01 RX ADMIN — TRAMADOL HYDROCHLORIDE 50 MG: 50 TABLET, FILM COATED ORAL at 16:06

## 2020-01-01 RX ADMIN — LABETALOL HYDROCHLORIDE 100 MG: 100 TABLET, FILM COATED ORAL at 21:17

## 2020-01-01 RX ADMIN — HEPARIN SODIUM 5000 UNITS: 5000 INJECTION INTRAVENOUS; SUBCUTANEOUS at 14:31

## 2020-01-01 RX ADMIN — QUETIAPINE FUMARATE 25 MG: 25 TABLET ORAL at 21:06

## 2020-01-01 RX ADMIN — MORPHINE SULFATE 1 MG: 2 INJECTION, SOLUTION INTRAMUSCULAR; INTRAVENOUS at 14:09

## 2020-01-01 RX ADMIN — LABETALOL HYDROCHLORIDE 100 MG: 100 TABLET, FILM COATED ORAL at 08:46

## 2020-01-01 RX ADMIN — METRONIDAZOLE 500 MG: 500 INJECTION, SOLUTION INTRAVENOUS at 04:38

## 2020-01-01 RX ADMIN — Medication 10 ML: at 05:35

## 2020-01-01 RX ADMIN — AMLODIPINE BESYLATE 5 MG: 5 TABLET ORAL at 08:58

## 2020-01-01 RX ADMIN — VECURONIUM BROMIDE 3 MG: 1 INJECTION, POWDER, LYOPHILIZED, FOR SOLUTION INTRAVENOUS at 08:26

## 2020-01-01 RX ADMIN — HYDROMORPHONE HYDROCHLORIDE 1 MG: 1 INJECTION, SOLUTION INTRAMUSCULAR; INTRAVENOUS; SUBCUTANEOUS at 16:22

## 2020-01-01 RX ADMIN — ASPIRIN 81 MG: 81 TABLET, COATED ORAL at 08:29

## 2020-01-01 RX ADMIN — Medication 10 ML: at 06:33

## 2020-01-01 RX ADMIN — SODIUM PHOSPHATE, MONOBASIC, MONOHYDRATE 20 MMOL: 276; 142 INJECTION, SOLUTION INTRAVENOUS at 15:12

## 2020-01-01 RX ADMIN — LABETALOL HYDROCHLORIDE 100 MG: 100 TABLET, FILM COATED ORAL at 00:43

## 2020-01-01 RX ADMIN — OXYCODONE 20 MG: 5 TABLET ORAL at 09:45

## 2020-01-01 RX ADMIN — HEPARIN SODIUM 5000 UNITS: 5000 INJECTION INTRAVENOUS; SUBCUTANEOUS at 08:58

## 2020-01-01 RX ADMIN — SODIUM BICARBONATE 650 MG: 650 TABLET ORAL at 08:49

## 2020-01-01 RX ADMIN — CEFEPIME 2 G: 20 INJECTION, POWDER, FOR SOLUTION INTRAVENOUS at 02:42

## 2020-01-01 RX ADMIN — SODIUM CHLORIDE 1000 ML: 900 INJECTION, SOLUTION INTRAVENOUS at 23:26

## 2020-01-01 RX ADMIN — SODIUM CHLORIDE 40 MG: 9 INJECTION INTRAMUSCULAR; INTRAVENOUS; SUBCUTANEOUS at 08:42

## 2020-01-01 RX ADMIN — TRAMADOL HYDROCHLORIDE 50 MG: 50 TABLET, FILM COATED ORAL at 08:53

## 2020-01-01 RX ADMIN — HYDROMORPHONE HYDROCHLORIDE 0.5 MG: 1 INJECTION, SOLUTION INTRAMUSCULAR; INTRAVENOUS; SUBCUTANEOUS at 13:50

## 2020-01-01 RX ADMIN — DEXTROSE MONOHYDRATE, SODIUM CHLORIDE, AND POTASSIUM CHLORIDE: 50; 4.5; 2.98 INJECTION, SOLUTION INTRAVENOUS at 10:01

## 2020-01-01 RX ADMIN — SODIUM CHLORIDE 40 MG: 9 INJECTION INTRAMUSCULAR; INTRAVENOUS; SUBCUTANEOUS at 09:11

## 2020-01-01 RX ADMIN — GLYCOPYRROLATE 0.2 MG: 0.2 INJECTION, SOLUTION INTRAMUSCULAR; INTRAVENOUS at 19:46

## 2020-01-01 RX ADMIN — HEPARIN SODIUM 5000 UNITS: 5000 INJECTION INTRAVENOUS; SUBCUTANEOUS at 21:44

## 2020-01-01 RX ADMIN — SUCRALFATE 1 G: 1 TABLET ORAL at 06:33

## 2020-01-01 RX ADMIN — DEXTROSE MONOHYDRATE, SODIUM CHLORIDE, AND POTASSIUM CHLORIDE: 50; 4.5; 2.98 INJECTION, SOLUTION INTRAVENOUS at 05:21

## 2020-01-01 RX ADMIN — MORPHINE SULFATE 2 MG: 2 INJECTION, SOLUTION INTRAMUSCULAR; INTRAVENOUS at 23:49

## 2020-01-01 RX ADMIN — MORPHINE SULFATE 1 MG: 2 INJECTION, SOLUTION INTRAMUSCULAR; INTRAVENOUS at 21:44

## 2020-01-01 RX ADMIN — LABETALOL HYDROCHLORIDE 100 MG: 100 TABLET, FILM COATED ORAL at 21:36

## 2020-01-01 RX ADMIN — Medication 10 ML: at 06:32

## 2020-01-01 RX ADMIN — Medication 10 ML: at 14:00

## 2020-01-01 RX ADMIN — METRONIDAZOLE 500 MG: 500 INJECTION, SOLUTION INTRAVENOUS at 02:45

## 2020-01-01 RX ADMIN — PHENYLEPHRINE HYDROCHLORIDE 80 MCG: 10 INJECTION INTRAVENOUS at 09:44

## 2020-01-01 RX ADMIN — MORPHINE SULFATE 4 MG: 2 INJECTION, SOLUTION INTRAMUSCULAR; INTRAVENOUS at 14:48

## 2020-01-01 RX ADMIN — GLYCOPYRROLATE 0.2 MG: 0.2 INJECTION, SOLUTION INTRAMUSCULAR; INTRAVENOUS at 03:20

## 2020-01-01 RX ADMIN — POTASSIUM CHLORIDE 10 MEQ: 10 INJECTION, SOLUTION INTRAVENOUS at 08:54

## 2020-01-01 RX ADMIN — METRONIDAZOLE 500 MG: 500 INJECTION, SOLUTION INTRAVENOUS at 21:03

## 2020-01-01 RX ADMIN — GLYCOPYRROLATE 0.2 MG: 0.2 INJECTION, SOLUTION INTRAMUSCULAR; INTRAVENOUS at 07:01

## 2020-01-01 RX ADMIN — HEPARIN SODIUM 5000 UNITS: 5000 INJECTION INTRAVENOUS; SUBCUTANEOUS at 13:40

## 2020-01-01 RX ADMIN — MORPHINE SULFATE 4 MG: 2 INJECTION, SOLUTION INTRAMUSCULAR; INTRAVENOUS at 03:42

## 2020-01-01 RX ADMIN — SODIUM BICARBONATE 650 MG: 650 TABLET ORAL at 18:00

## 2020-01-01 RX ADMIN — PANTOPRAZOLE SODIUM 40 MG: 40 TABLET, DELAYED RELEASE ORAL at 08:33

## 2020-01-01 RX ADMIN — SODIUM CHLORIDE 40 MG: 9 INJECTION INTRAMUSCULAR; INTRAVENOUS; SUBCUTANEOUS at 09:45

## 2020-01-01 RX ADMIN — MORPHINE SULFATE 2 MG: 2 INJECTION, SOLUTION INTRAMUSCULAR; INTRAVENOUS at 19:25

## 2020-01-01 RX ADMIN — ASPIRIN 81 MG: 81 TABLET, COATED ORAL at 08:50

## 2020-01-01 RX ADMIN — PHENYLEPHRINE HYDROCHLORIDE 40 MCG: 10 INJECTION INTRAVENOUS at 08:56

## 2020-01-01 RX ADMIN — Medication 10 ML: at 21:11

## 2020-01-01 RX ADMIN — QUETIAPINE FUMARATE 25 MG: 25 TABLET ORAL at 21:14

## 2020-01-01 RX ADMIN — CEFEPIME 2 G: 20 INJECTION, POWDER, FOR SOLUTION INTRAVENOUS at 13:37

## 2020-01-01 RX ADMIN — CEFEPIME 2 G: 20 INJECTION, POWDER, FOR SOLUTION INTRAVENOUS at 03:09

## 2020-01-01 RX ADMIN — Medication 10 ML: at 01:08

## 2020-01-01 RX ADMIN — HEPARIN SODIUM 5000 UNITS: 5000 INJECTION INTRAVENOUS; SUBCUTANEOUS at 06:04

## 2020-01-01 RX ADMIN — HYDROMORPHONE HYDROCHLORIDE 0.5 MG: 1 INJECTION, SOLUTION INTRAMUSCULAR; INTRAVENOUS; SUBCUTANEOUS at 11:24

## 2020-01-01 RX ADMIN — VECURONIUM BROMIDE 2 MG: 1 INJECTION, POWDER, LYOPHILIZED, FOR SOLUTION INTRAVENOUS at 08:14

## 2020-01-01 RX ADMIN — Medication 10 ML: at 06:28

## 2020-01-01 RX ADMIN — GLYCOPYRROLATE 0.2 MG: 0.2 INJECTION, SOLUTION INTRAMUSCULAR; INTRAVENOUS at 13:12

## 2020-01-01 RX ADMIN — HYDROMORPHONE HYDROCHLORIDE 2 MG: 2 INJECTION INTRAMUSCULAR; INTRAVENOUS; SUBCUTANEOUS at 07:27

## 2020-01-01 RX ADMIN — HALOPERIDOL LACTATE 2 MG: 5 INJECTION, SOLUTION INTRAMUSCULAR at 16:45

## 2020-01-01 RX ADMIN — SODIUM BICARBONATE 650 MG: 650 TABLET ORAL at 18:08

## 2020-01-01 RX ADMIN — PANTOPRAZOLE SODIUM 40 MG: 40 TABLET, DELAYED RELEASE ORAL at 09:12

## 2020-01-01 RX ADMIN — LABETALOL HYDROCHLORIDE 100 MG: 100 TABLET, FILM COATED ORAL at 11:46

## 2020-01-01 RX ADMIN — PHENYLEPHRINE HYDROCHLORIDE 80 MCG: 10 INJECTION INTRAVENOUS at 10:06

## 2020-01-01 RX ADMIN — LABETALOL HYDROCHLORIDE 100 MG: 100 TABLET, FILM COATED ORAL at 21:43

## 2020-01-01 RX ADMIN — Medication 5 MG: at 09:44

## 2020-01-01 RX ADMIN — POTASSIUM CHLORIDE 10 MEQ: 200 INJECTION, SOLUTION INTRAVENOUS at 06:36

## 2020-01-01 RX ADMIN — OXYCODONE HYDROCHLORIDE 20 MG: 100 SOLUTION ORAL at 14:11

## 2020-01-01 RX ADMIN — HYDROMORPHONE HYDROCHLORIDE 1 MG: 1 INJECTION, SOLUTION INTRAMUSCULAR; INTRAVENOUS; SUBCUTANEOUS at 17:41

## 2020-01-01 RX ADMIN — OXYCODONE HYDROCHLORIDE 40 MG: 20 TABLET, FILM COATED, EXTENDED RELEASE ORAL at 21:08

## 2020-01-01 RX ADMIN — SUCRALFATE 1 G: 1 TABLET ORAL at 10:50

## 2020-01-01 RX ADMIN — AMLODIPINE BESYLATE 10 MG: 5 TABLET ORAL at 10:14

## 2020-01-01 RX ADMIN — SODIUM BICARBONATE 650 MG: 650 TABLET ORAL at 09:12

## 2020-01-01 RX ADMIN — METRONIDAZOLE 500 MG: 500 INJECTION, SOLUTION INTRAVENOUS at 11:55

## 2020-01-01 RX ADMIN — METRONIDAZOLE 500 MG: 500 INJECTION, SOLUTION INTRAVENOUS at 20:54

## 2020-01-01 RX ADMIN — METRONIDAZOLE 500 MG: 500 INJECTION, SOLUTION INTRAVENOUS at 10:52

## 2020-01-01 RX ADMIN — SODIUM CHLORIDE 40 MG: 9 INJECTION INTRAMUSCULAR; INTRAVENOUS; SUBCUTANEOUS at 08:17

## 2020-01-01 RX ADMIN — METRONIDAZOLE 500 MG: 500 INJECTION, SOLUTION INTRAVENOUS at 08:18

## 2020-01-01 RX ADMIN — PANTOPRAZOLE SODIUM 40 MG: 40 TABLET, DELAYED RELEASE ORAL at 09:07

## 2020-01-01 RX ADMIN — CEFEPIME 2 G: 20 INJECTION, POWDER, FOR SOLUTION INTRAVENOUS at 15:12

## 2020-01-01 RX ADMIN — LEVOFLOXACIN 500 MG: 5 INJECTION, SOLUTION INTRAVENOUS at 01:35

## 2020-01-01 RX ADMIN — POTASSIUM CHLORIDE 10 MEQ: 200 INJECTION, SOLUTION INTRAVENOUS at 07:21

## 2020-01-01 RX ADMIN — POTASSIUM CHLORIDE 40 MEQ: 750 TABLET, FILM COATED, EXTENDED RELEASE ORAL at 08:21

## 2020-01-01 RX ADMIN — ONDANSETRON 4 MG: 2 INJECTION INTRAMUSCULAR; INTRAVENOUS at 10:50

## 2020-01-01 RX ADMIN — LABETALOL HYDROCHLORIDE 100 MG: 100 TABLET, FILM COATED ORAL at 08:42

## 2020-01-01 RX ADMIN — Medication 10 MG: at 08:17

## 2020-01-01 RX ADMIN — HEPARIN SODIUM 5000 UNITS: 5000 INJECTION INTRAVENOUS; SUBCUTANEOUS at 14:04

## 2020-01-01 RX ADMIN — GLYCOPYRROLATE 0.2 MG: 0.2 INJECTION, SOLUTION INTRAMUSCULAR; INTRAVENOUS at 03:42

## 2020-01-01 RX ADMIN — HEPARIN SODIUM 5000 UNITS: 5000 INJECTION INTRAVENOUS; SUBCUTANEOUS at 14:09

## 2020-01-01 RX ADMIN — SODIUM CHLORIDE 100 ML/HR: 900 INJECTION, SOLUTION INTRAVENOUS at 01:10

## 2020-01-01 RX ADMIN — MORPHINE SULFATE 1 MG: 2 INJECTION, SOLUTION INTRAMUSCULAR; INTRAVENOUS at 15:21

## 2020-01-01 RX ADMIN — LABETALOL HYDROCHLORIDE 100 MG: 100 TABLET, FILM COATED ORAL at 09:52

## 2020-01-01 RX ADMIN — SUCRALFATE 1 G: 1 TABLET ORAL at 10:58

## 2020-01-01 RX ADMIN — POTASSIUM CHLORIDE 10 MEQ: 750 TABLET, FILM COATED, EXTENDED RELEASE ORAL at 18:47

## 2020-01-01 RX ADMIN — PANTOPRAZOLE SODIUM 40 MG: 40 TABLET, DELAYED RELEASE ORAL at 09:52

## 2020-01-01 RX ADMIN — SODIUM BICARBONATE 650 MG: 650 TABLET ORAL at 08:17

## 2020-01-01 RX ADMIN — SODIUM CHLORIDE, POTASSIUM CHLORIDE, SODIUM LACTATE AND CALCIUM CHLORIDE: 600; 310; 30; 20 INJECTION, SOLUTION INTRAVENOUS at 08:00

## 2020-01-01 RX ADMIN — LABETALOL HYDROCHLORIDE 100 MG: 100 TABLET, FILM COATED ORAL at 08:29

## 2020-01-01 RX ADMIN — Medication 10 ML: at 22:03

## 2020-01-01 RX ADMIN — CEFEPIME 2 G: 20 INJECTION, POWDER, FOR SOLUTION INTRAVENOUS at 03:04

## 2020-01-01 RX ADMIN — HEPARIN SODIUM 5000 UNITS: 5000 INJECTION INTRAVENOUS; SUBCUTANEOUS at 13:45

## 2020-01-01 RX ADMIN — DEXTROSE MONOHYDRATE, SODIUM CHLORIDE, AND POTASSIUM CHLORIDE: 50; 4.5; 2.98 INJECTION, SOLUTION INTRAVENOUS at 15:12

## 2020-01-01 RX ADMIN — GLYCOPYRROLATE 0.2 MG: 0.2 INJECTION, SOLUTION INTRAMUSCULAR; INTRAVENOUS at 11:22

## 2020-01-01 RX ADMIN — ASPIRIN 325 MG: 325 TABLET ORAL at 23:26

## 2020-01-01 RX ADMIN — I.V. FAT EMULSION 500 ML: 20 EMULSION INTRAVENOUS at 17:08

## 2020-01-01 RX ADMIN — SUCRALFATE 1 G: 1 TABLET ORAL at 17:05

## 2020-01-01 RX ADMIN — HYDROMORPHONE HYDROCHLORIDE 0.5 MG: 1 INJECTION, SOLUTION INTRAMUSCULAR; INTRAVENOUS; SUBCUTANEOUS at 12:08

## 2020-01-01 RX ADMIN — POTASSIUM CHLORIDE 10 MEQ: 750 TABLET, FILM COATED, EXTENDED RELEASE ORAL at 18:09

## 2020-01-01 RX ADMIN — HYDROMORPHONE HYDROCHLORIDE 0.5 MG: 1 INJECTION, SOLUTION INTRAMUSCULAR; INTRAVENOUS; SUBCUTANEOUS at 17:34

## 2020-01-01 RX ADMIN — SODIUM CHLORIDE 40 MG: 9 INJECTION INTRAMUSCULAR; INTRAVENOUS; SUBCUTANEOUS at 08:47

## 2020-01-01 RX ADMIN — MORPHINE SULFATE 2 MG: 2 INJECTION, SOLUTION INTRAMUSCULAR; INTRAVENOUS at 12:48

## 2020-01-01 RX ADMIN — FENTANYL CITRATE 25 MCG: 50 INJECTION INTRAMUSCULAR; INTRAVENOUS at 13:12

## 2020-01-01 RX ADMIN — CEFEPIME 2 G: 20 INJECTION, POWDER, FOR SOLUTION INTRAVENOUS at 03:48

## 2020-01-01 RX ADMIN — FENTANYL CITRATE 25 MCG: 50 INJECTION INTRAMUSCULAR; INTRAVENOUS at 08:00

## 2020-01-01 RX ADMIN — Medication 10 ML: at 06:03

## 2020-01-01 RX ADMIN — CEFEPIME 2 G: 20 INJECTION, POWDER, FOR SOLUTION INTRAVENOUS at 15:21

## 2020-01-01 RX ADMIN — Medication 10 ML: at 06:48

## 2020-01-01 RX ADMIN — PHENYLEPHRINE HYDROCHLORIDE 80 MCG: 10 INJECTION INTRAVENOUS at 08:17

## 2020-01-01 RX ADMIN — VECURONIUM BROMIDE 2 MG: 1 INJECTION, POWDER, LYOPHILIZED, FOR SOLUTION INTRAVENOUS at 10:56

## 2020-01-01 RX ADMIN — LORAZEPAM 0.5 MG: 2 INJECTION INTRAMUSCULAR; INTRAVENOUS at 18:45

## 2020-01-01 RX ADMIN — Medication 10 ML: at 13:30

## 2020-01-01 RX ADMIN — Medication 10 ML: at 13:13

## 2020-01-01 RX ADMIN — POTASSIUM CHLORIDE 10 MEQ: 10 INJECTION, SOLUTION INTRAVENOUS at 12:19

## 2020-01-01 RX ADMIN — SODIUM CHLORIDE 1000 ML: 900 INJECTION, SOLUTION INTRAVENOUS at 22:11

## 2020-01-01 RX ADMIN — HYDROMORPHONE HYDROCHLORIDE 1 MG: 1 INJECTION, SOLUTION INTRAMUSCULAR; INTRAVENOUS; SUBCUTANEOUS at 14:46

## 2020-01-01 RX ADMIN — POTASSIUM CHLORIDE 10 MEQ: 750 TABLET, FILM COATED, EXTENDED RELEASE ORAL at 09:07

## 2020-01-01 RX ADMIN — SODIUM CHLORIDE 100 ML/HR: 900 INJECTION, SOLUTION INTRAVENOUS at 02:44

## 2020-01-01 RX ADMIN — ASCORBIC ACID, VITAMIN A PALMITATE, CHOLECALCIFEROL, THIAMINE HYDROCHLORIDE, RIBOFLAVIN-5 PHOSPHATE SODIUM, PYRIDOXINE HYDROCHLORIDE, NIACINAMIDE, DEXPANTHENOL, ALPHA-TOCOPHEROL ACETATE, VITAMIN K1, FOLIC ACID, BIOTIN, CYANOCOBALAMIN: 200; 3300; 200; 6; 3.6; 6; 40; 15; 10; 150; 600; 60; 5 INJECTION, SOLUTION INTRAVENOUS at 17:07

## 2020-01-01 RX ADMIN — LABETALOL HYDROCHLORIDE 100 MG: 100 TABLET, FILM COATED ORAL at 09:22

## 2020-01-01 RX ADMIN — METRONIDAZOLE 500 MG: 500 INJECTION, SOLUTION INTRAVENOUS at 08:44

## 2020-01-01 RX ADMIN — ACETAMINOPHEN 650 MG: 325 TABLET ORAL at 05:27

## 2020-01-01 RX ADMIN — Medication 10 ML: at 23:07

## 2020-01-01 RX ADMIN — GLYCOPYRROLATE 0.2 MG: 0.2 INJECTION, SOLUTION INTRAMUSCULAR; INTRAVENOUS at 16:19

## 2020-01-01 RX ADMIN — HYDROMORPHONE HYDROCHLORIDE 1 MG: 1 INJECTION, SOLUTION INTRAMUSCULAR; INTRAVENOUS; SUBCUTANEOUS at 11:22

## 2020-01-01 RX ADMIN — Medication 10 ML: at 13:57

## 2020-01-01 RX ADMIN — METRONIDAZOLE 500 MG: 500 INJECTION, SOLUTION INTRAVENOUS at 11:37

## 2020-01-01 RX ADMIN — ASCORBIC ACID, VITAMIN A PALMITATE, CHOLECALCIFEROL, THIAMINE HYDROCHLORIDE, RIBOFLAVIN-5 PHOSPHATE SODIUM, PYRIDOXINE HYDROCHLORIDE, NIACINAMIDE, DEXPANTHENOL, ALPHA-TOCOPHEROL ACETATE, VITAMIN K1, FOLIC ACID, BIOTIN, CYANOCOBALAMIN: 200; 3300; 200; 6; 3.6; 6; 40; 15; 10; 150; 600; 60; 5 INJECTION, SOLUTION INTRAVENOUS at 19:20

## 2020-01-01 RX ADMIN — LABETALOL HYDROCHLORIDE 100 MG: 100 TABLET, FILM COATED ORAL at 09:30

## 2020-01-01 RX ADMIN — MAGNESIUM SULFATE IN WATER 2 G: 40 INJECTION, SOLUTION INTRAVENOUS at 08:49

## 2020-01-01 RX ADMIN — GLYCOPYRROLATE 0.2 MG: 0.2 INJECTION, SOLUTION INTRAMUSCULAR; INTRAVENOUS at 19:49

## 2020-01-01 RX ADMIN — Medication 10 ML: at 21:36

## 2020-01-01 RX ADMIN — CEFEPIME 2 G: 20 INJECTION, POWDER, FOR SOLUTION INTRAVENOUS at 14:09

## 2020-01-01 RX ADMIN — LABETALOL HYDROCHLORIDE 100 MG: 100 TABLET, FILM COATED ORAL at 09:06

## 2020-01-01 RX ADMIN — POTASSIUM CHLORIDE 10 MEQ: 10 INJECTION, SOLUTION INTRAVENOUS at 06:58

## 2020-01-01 RX ADMIN — BENZOCAINE 1 SPRAY: 200 SPRAY DENTAL; ORAL; PERIODONTAL at 01:06

## 2020-01-01 RX ADMIN — MAGNESIUM SULFATE HEPTAHYDRATE 1 G: 1 INJECTION, SOLUTION INTRAVENOUS at 04:48

## 2020-01-01 RX ADMIN — HEPARIN SODIUM 5000 UNITS: 5000 INJECTION INTRAVENOUS; SUBCUTANEOUS at 14:43

## 2020-01-01 RX ADMIN — SUCRALFATE 1 G: 1 TABLET ORAL at 16:48

## 2020-01-01 RX ADMIN — MORPHINE SULFATE 2 MG: 2 INJECTION, SOLUTION INTRAMUSCULAR; INTRAVENOUS at 18:29

## 2020-01-01 RX ADMIN — POTASSIUM CHLORIDE 10 MEQ: 10 INJECTION, SOLUTION INTRAVENOUS at 04:27

## 2020-01-01 RX ADMIN — MORPHINE SULFATE 2 MG: 2 INJECTION, SOLUTION INTRAMUSCULAR; INTRAVENOUS at 19:49

## 2020-01-01 RX ADMIN — METRONIDAZOLE 500 MG: 500 INJECTION, SOLUTION INTRAVENOUS at 10:15

## 2020-01-01 RX ADMIN — HEPARIN SODIUM 5000 UNITS: 5000 INJECTION INTRAVENOUS; SUBCUTANEOUS at 21:06

## 2020-01-01 RX ADMIN — HEPARIN SODIUM 5000 UNITS: 5000 INJECTION INTRAVENOUS; SUBCUTANEOUS at 06:31

## 2020-01-01 RX ADMIN — LABETALOL HYDROCHLORIDE 100 MG: 100 TABLET, FILM COATED ORAL at 22:03

## 2020-01-01 RX ADMIN — SODIUM CHLORIDE 1000 ML: 900 INJECTION, SOLUTION INTRAVENOUS at 13:08

## 2020-01-01 RX ADMIN — AMLODIPINE BESYLATE 10 MG: 5 TABLET ORAL at 08:50

## 2020-01-01 RX ADMIN — HEPARIN SODIUM 5000 UNITS: 5000 INJECTION INTRAVENOUS; SUBCUTANEOUS at 21:10

## 2020-01-01 RX ADMIN — ASCORBIC ACID, VITAMIN A PALMITATE, CHOLECALCIFEROL, THIAMINE HYDROCHLORIDE, RIBOFLAVIN-5 PHOSPHATE SODIUM, PYRIDOXINE HYDROCHLORIDE, NIACINAMIDE, DEXPANTHENOL, ALPHA-TOCOPHEROL ACETATE, VITAMIN K1, FOLIC ACID, BIOTIN, CYANOCOBALAMIN: 200; 3300; 200; 6; 3.6; 6; 40; 15; 10; 150; 600; 60; 5 INJECTION, SOLUTION INTRAVENOUS at 18:31

## 2020-01-01 RX ADMIN — MORPHINE SULFATE 4 MG: 2 INJECTION, SOLUTION INTRAMUSCULAR; INTRAVENOUS at 23:47

## 2020-01-01 RX ADMIN — MORPHINE SULFATE 2 MG: 2 INJECTION, SOLUTION INTRAMUSCULAR; INTRAVENOUS at 23:12

## 2020-01-01 RX ADMIN — MORPHINE SULFATE 2 MG: 2 INJECTION, SOLUTION INTRAMUSCULAR; INTRAVENOUS at 01:08

## 2020-01-01 RX ADMIN — DEXTROSE MONOHYDRATE, SODIUM CHLORIDE, AND POTASSIUM CHLORIDE: 50; 4.5; 2.98 INJECTION, SOLUTION INTRAVENOUS at 05:12

## 2020-01-01 RX ADMIN — LABETALOL HYDROCHLORIDE 100 MG: 100 TABLET, FILM COATED ORAL at 08:21

## 2020-01-01 RX ADMIN — POTASSIUM CHLORIDE 10 MEQ: 750 TABLET, FILM COATED, EXTENDED RELEASE ORAL at 09:30

## 2020-01-01 RX ADMIN — PHENYLEPHRINE HYDROCHLORIDE 80 MCG: 10 INJECTION INTRAVENOUS at 10:10

## 2020-01-01 RX ADMIN — Medication 10 ML: at 05:08

## 2020-01-01 RX ADMIN — METRONIDAZOLE 500 MG: 500 INJECTION, SOLUTION INTRAVENOUS at 22:32

## 2020-01-01 RX ADMIN — POTASSIUM BICARBONATE 40 MEQ: 782 TABLET, EFFERVESCENT ORAL at 08:08

## 2020-01-01 RX ADMIN — HYDROMORPHONE HYDROCHLORIDE 2 MG: 2 INJECTION INTRAMUSCULAR; INTRAVENOUS; SUBCUTANEOUS at 08:40

## 2020-01-01 RX ADMIN — MORPHINE SULFATE 2 MG: 2 INJECTION, SOLUTION INTRAMUSCULAR; INTRAVENOUS at 01:51

## 2020-01-01 RX ADMIN — Medication 10 ML: at 21:44

## 2020-01-01 RX ADMIN — ONDANSETRON 4 MG: 4 TABLET, ORALLY DISINTEGRATING ORAL at 10:44

## 2020-01-01 RX ADMIN — METRONIDAZOLE 500 MG: 500 INJECTION, SOLUTION INTRAVENOUS at 10:50

## 2020-01-01 RX ADMIN — GLYCOPYRROLATE 0.2 MG: 0.2 INJECTION, SOLUTION INTRAMUSCULAR; INTRAVENOUS at 19:52

## 2020-01-01 RX ADMIN — CEFEPIME 2 G: 2 INJECTION, POWDER, FOR SOLUTION INTRAVENOUS at 14:29

## 2020-01-01 RX ADMIN — MORPHINE SULFATE 2 MG: 2 INJECTION, SOLUTION INTRAMUSCULAR; INTRAVENOUS at 03:20

## 2020-04-30 PROBLEM — D72.829 LEUKOCYTOSIS: Status: ACTIVE | Noted: 2020-01-01

## 2020-04-30 PROBLEM — R77.8 ELEVATED TROPONIN: Status: ACTIVE | Noted: 2020-01-01

## 2020-04-30 PROBLEM — I10 HTN (HYPERTENSION): Status: ACTIVE | Noted: 2020-01-01

## 2020-04-30 PROBLEM — F03.90 DEMENTIA (HCC): Status: ACTIVE | Noted: 2020-01-01

## 2020-04-30 PROBLEM — N28.9 RENAL INSUFFICIENCY: Status: ACTIVE | Noted: 2020-01-01

## 2020-04-30 PROBLEM — K52.9 ENTERITIS: Status: ACTIVE | Noted: 2020-01-01

## 2020-04-30 NOTE — PROGRESS NOTES
TRANSFER - IN REPORT: 
 
Verbal report received from [facility], RN (name) on Luke Harris  being received from [facility] (unit) for routine progression of care Report consisted of patients Situation, Background, Assessment and  
Recommendations(SBAR). Information from the following report(s) SBAR, Kardex, Intake/Output, MAR and Accordion was reviewed with the receiving nurse. Opportunity for questions and clarification was provided. Assessment completed upon patients arrival to unit and care assumed.

## 2020-04-30 NOTE — ROUTINE PROCESS
Primary Nurse Aleisha Kowalski RN and Miri Holloway RN performed a dual skin assessment on this patient No impairment noted Baltazar score is 18

## 2020-04-30 NOTE — PROGRESS NOTES
Problem: Self Care Deficits Care Plan (Adult) Goal: *Acute Goals and Plan of Care (Insert Text) Description:  
FUNCTIONAL STATUS PRIOR TO ADMISSION: Pt poor historian; however, reports living with daughter in law, residing on first floor of two story house, with no MARILYN, stating he was Independent with ADLs without use of DME, standing to shower in walk-in shower. Reports he enjoys listening to all music but the \"hillbilly\" kind. HOME SUPPORT: The patient lived with daughter in law who provides PRN ADL/IADL assist. 
 
Occupational Therapy Goals Initiated 4/30/2020 1. Patient will perform standing grooming with supervision within 7 day(s). 2.  Patient will perform lower body dressing with supervision within 7 day(s). 3.  Patient will perform bathing with supervision within 7 day(s). 4.  Patient will perform toilet transfers with supervision within 7 day(s). 5.  Patient will perform all aspects of toileting with supervision within 7 day(s). 6.  Patient will participate in upper extremity therapeutic exercise/activities with supervision for 10 minutes within 7 day(s). 7.  Patient will utilize energy conservation techniques during functional activities with Min verbal cues within 7 day(s). Outcome: Progressing Towards Goal 
OCCUPATIONAL THERAPY EVALUATION Patient: Max Marie (24 y.o. male) Date: 4/30/2020 Primary Diagnosis: Enteritis [K52.9] Precautions:   Fall, Contact ASSESSMENT Based on the objective data described below, the patient presents with decreased functional mobility/balance, strength/endurance, activity tolerance, abdominal pain, fluctuating mentation and poor distal LE ADL management, all of which limit pt's ability to complete ADLs at reported baseline. Currently, pt benefits from [facility] for self-feeding, CGA for standing grooming, Moderate A for bathing, S/U for UB dressing and Minimal A for LB dressing and toileting.   It is believed by reporting therapist that patient would benefit from short inpatient rehab stay for return to PLOF, with pt currently benefiting from skilled OT treatment to address above listed functional deficits in an overall effort at maximizing his highest level of safe functional independence prior to discharge from acute hospitalization. Current Level of Function Impacting Discharge (ADLs/self-care): [place] for self-feeding, CGA for standing grooming, Moderate A for bathing, S/U for UB dressing and Minimal A for LB dressing and toileting Functional Outcome Measure: The patient scored 50/100 on the Barthel Index outcome measure. Other factors to consider for discharge: fluctuating mentation Patient will benefit from skilled therapy intervention to address the above noted impairments. PLAN : 
Recommendations and Planned Interventions: self care training, functional mobility training, therapeutic exercise, balance training, therapeutic activities, cognitive retraining, endurance activities, and patient education Frequency/Duration: Patient will be followed by occupational therapy 5 times a week to address goals. Recommendation for discharge: (in order for the patient to meet his/her long term goals) Therapy 3 hours per day 5-7 days per week This discharge recommendation: 
Has not yet been discussed the attending provider and/or case management IF patient discharges home will need the following DME: TBD SUBJECTIVE:  
Patient stated i'm not used to having help, I'm a self sufficient man.  OBJECTIVE DATA SUMMARY:  
HISTORY:  
History reviewed. No pertinent past medical history. History reviewed. No pertinent surgical history. Expanded or extensive additional review of patient history:  
 
Home Situation Home Environment: Private residence One/Two Story Residence: Two story, live on 1st floor Living Alone: No 
Support Systems: None Patient Expects to be Discharged to[de-identified] Private residence Current DME Used/Available at Home: None Tub or Shower Type: Shower Hand dominance: Right EXAMINATION OF PERFORMANCE DEFICITS: 
Cognitive/Behavioral Status: 
Neurologic State: Alert Orientation Level: Oriented to person;Oriented to place Cognition: Follows commands; Impaired decision making Perception: Appears intact Perseveration: No perseveration noted Safety/Judgement: Lack of insight into deficits; Decreased awareness of need for safety;Decreased awareness of environment;Decreased awareness of need for assistance Hearing: Auditory Auditory Impairment: None Vision/Perceptual:   
Acuity: Within Defined Limits Corrective Lenses: Reading glasses Range of Motion: 
AROM: Generally decreased, functional 
PROM: Generally decreased, functional 
 
Strength: 
Strength: Generally decreased, functional 
 
Coordination: 
Coordination: Within functional limits Fine Motor Skills-Upper: Left Intact; Right Intact Gross Motor Skills-Upper: Left Intact; Right Intact Tone & Sensation: 
Tone: Normal 
Sensation: Intact Balance: 
Sitting: Impaired; Without support Sitting - Static: Good (unsupported) Sitting - Dynamic: Fair (occasional) Standing: Impaired Standing - Static: Fair Standing - Dynamic : Fair Functional Mobility and Transfers for ADLs: 
Bed Mobility: 
Supine to Sit: Stand-by assistance Transfers: 
Sit to Stand: Minimum assistance Stand to Sit: Contact guard assistance Bed to Chair: Contact guard assistance Bathroom Mobility: Contact guard assistance Toilet Transfer : Minimum assistance ADL Assessment: 
Feeding: Independent Oral Facial Hygiene/Grooming: Contact guard assistance Bathing: Moderate assistance Upper Body Dressing: Setup Lower Body Dressing: Minimum assistance Toileting: Minimum assistance ADL Intervention and task modifications: 
Cognitive Retraining Safety/Judgement: Lack of insight into deficits; Decreased awareness of need for safety;Decreased awareness of environment;Decreased awareness of need for assistance Patient instructed and indicated understanding the benefits of maintaining activity tolerance, functional mobility, and independence with self care tasks during acute stay  to ensure safe return home and to baseline. Encouraged patient to increase frequency and duration OOB, be out of bed for all meals, perform daily ADLs (as approved by RN/MD regarding bathing etc), and performing functional mobility to/from bathroom. Pt educated on safe transfer techniques, with specific emphasis on proper hand placement to push up from seated surface rather than attempt to pull self up, fully positioning self in-front of desired seated location, feeling chair on back of legs and reaching back with 1-2 UE to slowly lower self to seated position. Functional Measure: 
Barthel Index: 
 
Bathin Bladder: 5 Bowels: 5 Groomin Dressin Feeding: 10 Mobility: 5 Stairs: 0 Toilet Use: 5 Transfer (Bed to Chair and Back): 10 Total: 50/100 The Barthel ADL Index: Guidelines 1. The index should be used as a record of what a patient does, not as a record of what a patient could do. 2. The main aim is to establish degree of independence from any help, physical or verbal, however minor and for whatever reason. 3. The need for supervision renders the patient not independent. 4. A patient's performance should be established using the best available evidence. Asking the patient, friends/relatives and nurses are the usual sources, but direct observation and common sense are also important. However direct testing is not needed. 5. Usually the patient's performance over the preceding 24-48 hours is important, but occasionally longer periods will be relevant. 6. Middle categories imply that the patient supplies over 50 per cent of the effort. 7. Use of aids to be independent is allowed. Yehuda Kruger., Barthel, D.W. ([id number]). Functional evaluation: the Barthel Index. [address] (14)2. RAMÓN Alvarez, Tye Natarajan., Chelsie Bledsoe., Tim, [address] (). Measuring the change indisability after inpatient rehabilitation; comparison of the responsiveness of the Barthel Index and Functional [place] Measure. Journal of Neurology, Neurosurgery, and Psychiatry, 66(4), 267-410. MILADY Nelson, EVERETT Tee, & Nel Zaragoza M.A. (2004.) Assessment of post-stroke quality of life in cost-effectiveness studies: The usefulness of the Barthel Index and the EuroQoL-5D. [facility], 13, 344-68 Occupational Therapy Evaluation Charge Determination History Examination Decision-Making LOW Complexity : Brief history review  MEDIUM Complexity : 3-5 performance deficits relating to physical, cognitive , or psychosocial skils that result in activity limitations and / or participation restrictions LOW Complexity : No comorbidities that affect functional and no verbal or physical assistance needed to complete eval tasks Based on the above components, the patient evaluation is determined to be of the following complexity level: LOW Pain Ratin/10 c/o pain across abdomen; nursing aware and following Activity Tolerance:  
Fair and requires rest breaks Please refer to the flowsheet for vital signs taken during this treatment. After treatment patient left in no apparent distress:   
Sitting in chair, Call bell within reach, and nursing present COMMUNICATION/EDUCATION:  
The patients plan of care was discussed with: Registered nurse. Home safety education was provided and the patient/caregiver indicated understanding., Patient/family have participated as able in goal setting and plan of care. , and Patient/family agree to work toward stated goals and plan of care. This patients plan of care is appropriate for delegation to ALEJANDRA. Thank you for this referral. 
Catrachita Ramirez OT Time Calculation: 42 mins

## 2020-04-30 NOTE — PROGRESS NOTES
[address] Dosing Services: Antimicrobial Stewardship Progress Note Consult for antibiotic dosing of Cefepime by Dr. Avelino Harris Pharmacist reviewed antibiotic appropriateness for 67year old , male  for indication of enteritis. Day of Therapy 1 Non-Kinetic Antimicrobial Dosing:  
Recommendation: Cefepime 2 gm IV every 12 hours Other Antimicrobial 
(not dosed by pharmacist) Metronidazole 500 mg IV every 8 hours Cultures 4/30 Blood: IP 
4/30 Urine: IP Serum Creatinine Lab Results Component Value Date/Time Creatinine 1.83 (H) 04/30/2020 11:18 AM  
   
Creatinine Clearance Estimated Creatinine Clearance: 35.2 mL/min (A) (based on SCr of 1.83 mg/dL (H)). Procalcitonin  No results found for: PCT Temp 98.8 °F (37.1 °C) WBC Lab Results Component Value Date/Time WBC 17.0 (H) 04/29/2020 10:01 PM  
   
H/H Lab Results Component Value Date/Time HGB 15.5 04/29/2020 10:01 PM  
  
Platelets Lab Results Component Value Date/Time PLATELET 538 55/60/5734 10:01 PM  
 
  
 
 
Pharmacist: Gary Turner Contact information: [phone]

## 2020-04-30 NOTE — PROGRESS NOTES
Pharmacy change Levaquin to 750 mg IV q48H, for CrCl of <50 ml/min. Treating enteritis along with metronidazole. Pharmacy will follow daily.

## 2020-04-30 NOTE — CONSULTS
Consult dict Elevated Cr VERONIQUE vs CKD vs Both Enteritis/ileus Likely some volume depletion HTN Rec: 
Continue IVf Check urine studies. Consider more extensive w/u depending on results and response to tx Serial labs Agree with [facility][1]

## 2020-04-30 NOTE — PROGRESS NOTES
Attempted to see patient but he is currently getting echo and then being transferred to 5th floor. PT will follow and proceed as soon as patient is available. Nursing aware.

## 2020-04-30 NOTE — ED TRIAGE NOTES
Triage: Per EMS pt has had nausea, vomiting and diarrhea. Pt has had decreased PO intake over the past two days. Per daughter pt has been weak for the past two days as well.

## 2020-04-30 NOTE — ACP (ADVANCE CARE PLANNING)
[address] [facility] Adult  Hospitalist Group Advance Care Planning Note Name: Lucy Massey YOB: 1940 MRN: [id number] Admission Date: 4/29/2020  9:50 PM 
 
Date of discussion: 4/30/2020 Active Diagnoses: 
 
Hospital Problems  Never Reviewed Codes Class Noted POA Enteritis ICD-10-CM: K52.9 ICD-9-CM: 558.9  4/30/2020 Unknown These active diagnoses are of sufficient risk that focused discussion on advance care planning is indicated in order to allow the patient to thoughtfully consider personal goals of care, and if situations arise that prevent the ability to personally give input, to ensure appropriate representation of their personal desires for different levels and aggressiveness of care. Discussion:  
 
Persons present and participating in discussion: Misti Torres, patient's daughter, and  Rell Downs MD, Discussion: Discussed with daughter about patients overall condition. 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 She states he did not mention any advance directives to her in the past, so she would like for him to be full code. She is also the patients power of . [phone]. Time Spent:  
 
Total time spent face-to-face in education and discussion: 16 minutes.   
 
Rell Downs MD 
Date of Service:  4/30/2020 
1:48 AM

## 2020-04-30 NOTE — ED PROVIDER NOTES
52-year-old female presenting to the ER with report of fatigue malaise. Patient lives with his daughter and has history of dementia and high blood pressure. Daughter reports last week patient had episodes of nausea vomiting diarrhea and since then has had decreased p.o. intake and increased fatigue malaise. No report of fevers. Daughter states that patient has not been having any further episodes of vomiting. However has had significant decreased p.o. intake and concern for dehydration. Patient is at his baseline mental status. Daughter reports that he has been having weight loss and continued to not eat much today so sent him in for evaluation. No past medical history on file. No past surgical history on file. No family history on file. Social History Socioeconomic History  Marital status: Not on file Spouse name: Not on file  Number of children: Not on file  Years of education: Not on file  Highest education level: Not on file Occupational History  Not on file Social Needs  Financial resource strain: Not on file  Food insecurity Worry: Not on file Inability: Not on file  Transportation needs Medical: Not on file Non-medical: Not on file Tobacco Use  Smoking status: Not on file Substance and Sexual Activity  Alcohol use: Not on file  Drug use: Not on file  Sexual activity: Not on file Lifestyle  Physical activity Days per week: Not on file Minutes per session: Not on file  Stress: Not on file Relationships  Social connections Talks on phone: Not on file Gets together: Not on file Attends Shinto service: Not on file Active member of club or organization: Not on file Attends meetings of clubs or organizations: Not on file Relationship status: Not on file  Intimate partner violence Fear of current or ex partner: Not on file Emotionally abused: Not on file Physically abused: Not on file Forced sexual activity: Not on file Other Topics Concern  Not on file Social History Narrative  Not on file ALLERGIES: Patient has no known allergies. Review of Systems Unable to perform ROS: Dementia Constitutional: Positive for activity change, appetite change, fatigue and unexpected weight change (weight loss (reported by daughter)). Negative for chills and fever. HENT: Negative for congestion and sore throat. Eyes: Negative for pain. Respiratory: Negative for shortness of breath. Cardiovascular: Negative for chest pain. Gastrointestinal: Positive for diarrhea (resolved), nausea (resolved) and vomiting (resolved). Negative for abdominal pain and constipation. Genitourinary: Negative for dysuria and flank pain. Musculoskeletal: Negative for back pain and neck pain. Skin: Negative for rash. Neurological: Negative for dizziness and headaches. Vitals:  
 04/29/20 2153 BP: (!) 153/104 Pulse: 89 Resp: 16 Temp: 98.7 °F (37.1 °C) SpO2: 97% Physical Exam 
Constitutional:   
   Appearance: He is well-developed. HENT:  
   Head: Normocephalic. Nose: Nose normal.  
   Mouth/Throat:  
   Mouth: Mucous membranes are dry. Eyes:  
   Conjunctiva/sclera: Conjunctivae normal.  
Neck: Musculoskeletal: Normal range of motion and neck supple. Cardiovascular:  
   Rate and Rhythm: Normal rate and regular rhythm. Pulmonary:  
   Effort: Pulmonary effort is normal. No respiratory distress. Breath sounds: Normal breath sounds. Abdominal:  
   General: Bowel sounds are normal. There is distension. Palpations: Abdomen is soft. Tenderness: There is no abdominal tenderness. Musculoskeletal: Normal range of motion. Skin: 
   General: Skin is warm. Capillary Refill: Capillary refill takes less than 2 seconds. Findings: No rash. Neurological: Mental Status: He is alert. Mental status is at baseline. He is disoriented. Cranial Nerves: No cranial nerve deficit. Sensory: No sensory deficit. Motor: No weakness. Comments: No gross motor or sensory deficits MDM Number of Diagnoses or Management Options VERONIQUE (acute kidney injury) ([facility] [address]):  
Dehydration:  
Elevated troponin:  
Enteritis:  
Leukocytosis, unspecified type:  
Diagnosis management comments: Brought in by daughter with report of fatigue malaise and episodes of nausea vomiting diarrhea last week. States that nausea vomiting diarrhea has resolved. However decreased p.o. intake. Concern for dehydration. Patient has elevated creatinine with no baseline. And signs of dehydration. Patient had distended abdomen but no abdominal pain. X-ray showing distended bowel loops. Ordered CAT scans rule out small bowel obstruction versus ileus. CAT scan showing bowel distention and thickening consistent with enteritis. Patient also has thickening of the bladder likely secondary to chronic outlet obstruction. Attempting to get urine output difficulty passing [place] no fever. No other sirs criteria other than leukocytosis. Amount and/or Complexity of Data Reviewed Clinical lab tests: reviewed Tests in the radiology section of CPT®: reviewed ED Course as of Apr 30 0043 Wed Apr 29, 2020 2246 EKG: Normal sinus rhythm rate of 94 beats minute normal intervals. Normal axis. No ST elevation or depressions. Q waves in lead III and aVF. EKG interpreted by Jony Ansari MD 
  
 [ZD] 2249 Creatinine(!): 2.24 [ZD] 2250 Troponin-I, Qt.(!): 0.10 [ZD] 2250 WBC(!): 17.0 [ZD] 2250 CO2(!): 20 [ZD] Thu Apr 30, 2020  
0038 Discussed results with patient's daughter.  
 [ZD] ED Course User Index [ZD] Emerald Koch MD  
 
 
Procedures Hospitalist Perfect Serve for Admission 11:31 PM 
 
ED Room Number: ER07/07 Patient Name and age:  Luke Harris 67 y.o.  male Working Diagnosis: 1. VERONIQUE (acute kidney injury) ([address] 2. Dehydration 3. Elevated troponin 4. Leukocytosis, unspecified type 5. Enteritis COVID-19 Suspicion:  no 
 
Readmission: no 
Isolation Requirements:  no 
Recommended Level of Care:  med/surg with tele Department:[facility] ED - [phone] Other:  No chest pain, no abd pain Recent Results (from the past 24 hour(s)) SAMPLES BEING HELD Collection Time: 04/29/20 10:01 PM  
Result Value Ref Range SAMPLES BEING HELD 1SST,1BLUE   
 COMMENT Add-on orders for these samples will be processed based on acceptable specimen integrity and analyte stability, which may vary by analyte. CBC WITH AUTOMATED DIFF Collection Time: 04/29/20 10:01 PM  
Result Value Ref Range WBC 17.0 (H) 4.1 - 11.1 K/uL  
 RBC 5.10 4. 10 - 5.70 M/uL  
 HGB 15.5 12.1 - 17.0 g/dL HCT 46.9 36.6 - 50.3 % MCV 92.0 80.0 - 99.0 FL  
 MCH 30.4 26.0 - 34.0 PG  
 MCHC 33.0 30.0 - 36.5 g/dL  
 RDW 14.5 11.5 - 14.5 % PLATELET 806 932 - 203 K/uL MPV 10.4 8.9 - 12.9 FL  
 NRBC 0.0 0  WBC ABSOLUTE NRBC 0.00 0.00 - 0.01 K/uL NEUTROPHILS 79 (H) 32 - 75 % BAND NEUTROPHILS 5 0 - 6 % LYMPHOCYTES 12 12 - 49 % MONOCYTES 4 (L) 5 - 13 % EOSINOPHILS 0 0 - 7 % BASOPHILS 0 0 - 1 % IMMATURE GRANULOCYTES 0 %  
 ABS. NEUTROPHILS 14.3 (H) 1.8 - 8.0 K/UL  
 ABS. LYMPHOCYTES 2.0 0.8 - 3.5 K/UL  
 ABS. MONOCYTES 0.7 0.0 - 1.0 K/UL  
 ABS. EOSINOPHILS 0.0 0.0 - 0.4 K/UL  
 ABS. BASOPHILS 0.0 0.0 - 0.1 K/UL  
 ABS. IMM. GRANS. 0.0 K/UL  
 DF MANUAL    
 RBC COMMENTS NORMOCYTIC, NORMOCHROMIC    
 WBC COMMENTS TOXIC GRANULATION    
METABOLIC PANEL, COMPREHENSIVE Collection Time: 04/29/20 10:01 PM  
Result Value Ref Range Sodium 142 136 - 145 mmol/L Potassium 3.6 3.5 - 5.1 mmol/L Chloride 112 (H) 97 - 108 mmol/L  
 CO2 20 (L) 21 - 32 mmol/L  Anion gap 10 5 - 15 mmol/L  
 Glucose 133 (H) 65 - 100 mg/dL BUN 34 (H) 6 - 20 MG/DL Creatinine 2.24 (H) 0.70 - 1.30 MG/DL  
 BUN/Creatinine ratio 15 12 - 20 GFR est AA 35 (L) >60 ml/min/1.73m2 GFR est non-AA 28 (L) >60 ml/min/1.73m2 Calcium 8.1 (L) 8.5 - 10.1 MG/DL Bilirubin, total 1.0 0.2 - 1.0 MG/DL  
 ALT (SGPT) 16 12 - 78 U/L  
 AST (SGOT) 17 15 - 37 U/L Alk. phosphatase 69 45 - 117 U/L Protein, total 7.2 6.4 - 8.2 g/dL Albumin 2.9 (L) 3.5 - 5.0 g/dL Globulin 4.3 (H) 2.0 - 4.0 g/dL A-G Ratio 0.7 (L) 1.1 - 2.2    
TROPONIN I Collection Time: 04/29/20 10:01 PM  
Result Value Ref Range Troponin-I, Qt. 0.10 (H) <0.05 ng/mL MAGNESIUM Collection Time: 04/29/20 10:01 PM  
Result Value Ref Range Magnesium 2.0 1.6 - 2.4 mg/dL LIPASE Collection Time: 04/29/20 10:01 PM  
Result Value Ref Range Lipase 156 73 - 393 U/L Xr Abd (kub) Result Date: 4/29/2020 Clinical history: fatigue INDICATION:   fatigue FINDINGS: Supine view of the abdomen is obtained. There is no abnormal mass, calcification. Extensive small and large bowel distention is noted. Left hip arthroplasty. IMPRESSION: Gaseous small and large bowel distention. Xr Chest [facility] Result Date: 4/29/2020 PORTABLE CHEST RADIOGRAPH/S: 4/29/2020 11:20 PM Clinical history: fatigue INDICATION:   fatigue COMPARISON: None FINDINGS: AP portable upright view of the chest demonstrates a stable  cardiopericardial silhouette. The lungs are adequately expanded. There is no edema, effusion, consolidation, or pneumothorax. The osseous structures are unremarkable. Patient is on a cardiac monitor. IMPRESSION: No acute process.

## 2020-04-30 NOTE — PROGRESS NOTES
[facility]: MED RECONCILIATION Comments/Recommendations:  
 
Discussed meds with the daughter and stated he takes 1 BP medication but did not know the name I called Patrick and they have Amlodipine-Benazepril 5-10mg daily on file from Aug 2019 and nothing else. The daughter indicated he received this BP once but does not like taking pills. It is highly likely he is not taking regularly Information obtained from: Daughter, Evie Bragg Allergies: Patient has no known allergies. Prior to Admission Medications:  
 
Medication Documentation Review Audit Reviewed by Emerson Bryan, PHARMD (Pharmacist) on 04/30/20 at 9393 Medication Sig Documenting Provider Last Dose Status Taking? No Medications to Display John Marcelino

## 2020-04-30 NOTE — PROGRESS NOTES
0700-Bedside and Verbal shift change report given to [address] [place] [address]  (oncoming nurse) by Angelina Valle (offgoing nurse). Report included the following information SBAR, Kardex, Intake/Output, MAR, Accordion, Recent Results and Med Rec Status. 1145- Post void bladder scan showed 225mL. Will recheck this afternoon. Verbal order received for one time dose of 5mg Norvasc and increase daily dose to 10mg per Dr. Dinorah Calles  
 
1425- Verified with Jan, pharmacist to go ahead and give 1400 dose of heparin. 1600-TRANSFER - OUT REPORT: 
 
Verbal report given to 23 Ana Durand (name) on Sean [facility]  being transferred to Med Surg (unit) for routine progression of care Report consisted of patients Situation, Background, Assessment and  
Recommendations(SBAR). Information from the following report(s) SBAR, Kardex, Intake/Output, MAR, Accordion, Recent Results and Med Rec Status was reviewed with the receiving nurse. Lines:  
Peripheral IV 04/29/20 Right Antecubital (Active) Site Assessment Clean, dry, & intact 4/30/2020  8:58 AM  
Phlebitis Assessment 0 4/30/2020  8:58 AM  
Infiltration Assessment 0 4/30/2020  8:58 AM  
Dressing Status Clean, dry, & intact 4/30/2020  8:58 AM  
Dressing Type Transparent 4/30/2020  8:58 AM  
Hub Color/Line Status End cap changed 4/30/2020  8:58 AM  
Action Taken Open ports on tubing capped 4/30/2020  8:58 AM  
Alcohol Cap Used Yes 4/30/2020  8:58 AM  
  
 
Opportunity for questions and clarification was provided. Patient transported with: 
 [employer]

## 2020-04-30 NOTE — CONSULTS
[address] [facility] Name:  Clara Marcelino 
MR#:  [id number] :  1940 ACCOUNT #:  [de-identified] DATE OF SERVICE:  2020 NEPHROLOGY CONSULTATION 
 
REQUESTING PHYSICIAN:  Dr. Leah Sanchez. CHIEF COMPLAINT:  Elevated creatinine. HISTORY OF PRESENT ILLNESS:  The patient is a 60[facility] with no recent medical followup and no known prior medical history. He came to the hospital with reported history of nausea, vomiting, diarrhea for over a week. He has a distended abdomen and diffuse distention of small and large bowel. His serum creatinine on admission is 2.24. There is no baseline for comparison obviously. His blood pressure is elevated and he has been started on Norvasc. He is receiving intravenous fluids and he is making urine. He denies any use of NSAIDs. REVIEW OF SYSTEMS: 
CONSTITUTIONAL:  No fevers or chills. GI:  Positive for nausea, vomiting, diarrhea, although he is pretty vague about his symptoms at this point. :  Voiding well. No hematuria or foamy urine. CARDIOVASCULAR:  No chest pain or shortness of breath. All other systems reviewed and are negative except as per history of present illness. PAST MEDICAL HISTORY:  None. SOCIAL HISTORY:  He does not smoke. PHYSICAL EXAMINATION 
VITAL SIGNS:  Temperature 98.8, pulse 88, blood pressure 157/107. GENERAL:  Elderly  male, in no acute distress. HEENT:  Eyes anicteric. Extraocular movements intact. ENMT:  Mucous membranes are moist.  There is no epistaxis. NECK:  Nontender. There is no JVD. HEART:  Regular. There is trace 1+ lower extremity edema. RESPIRATORY:  Lungs are clear with fair effort. GI:  Abdomen is protuberant, nontender. There is no guarding or rebound. Bowel sounds are active. SKIN:  Warm with normal turgor. There is no rash. MUSCULOSKELETAL:  There is no cyanosis or clubbing.   There is no synovitis in fingers or wrists bilaterally. LABORATORY:  Sodium 142, potassium 3.6, chloride 112, bicarb 20, BUN 34, creatinine 2.24. White count 17.0, hemoglobin 15.5, platelets 267. CT scan of the abdomen and pelvis in addition to the GI findings above showed a right adrenal adenoma and no evidence of urinary obstruction. There were some possible left renal cyst. 
 
ASSESSMENT:  Elevated serum creatinine. There may be an acute component to his renal dysfunction due to his gastrointestinal illness and subsequent volume depletion. There very well may be some underlying chronic kidney disease but at this point it is hard to say. He does appear to have untreated hypertension which could account for the chronic kidney disease or there could be kidney disease of other causes. PLAN: 
1. Continue volume expansion as you are doing. 2.  Monitor serial labs. 3.  There is no acute indication for dialysis. 4.  We will check urine studies and consider more extensive workup if indicated based on his clinical course and results of his initial diagnostic tests. 5.  Agree with Norvasc for hypertension. 6.  Avoid nephrotoxins. 7.  We will follow with you to assist in his management. Shawna Esposito MD 
 
 
[id number] 
D:  04/30/2020 12:10 
T:  04/30/2020 12:42 JOB #:  [id number]

## 2020-04-30 NOTE — PROGRESS NOTES
Trey Kincaid [address]bb [address] 
[address] [facility], [address] [place] [address] 
[phone] Medical Progress Note NAME: Ronan Guallpa :  1940 MRM:  [id number] Date/Time: 2020 Assessment / Plan:  
 
  Enteritis: CT showed extensive small and large bowel distention w/ fluid-filled loops of small and large bowel, possible severe enteritis. Distal circumferential rectal wall thickening may be related to infectious process, neoplastic etiology is not excluded. Agree with empiric abx, change Levaquin to cefepime. Continue Flagyl. GI consult. Will ultimately need colonoscopy Renal insufficiency: VERONIQUE vs CKD3-4. T showed circumferential bladder wall thickening most likely related to chronic outlet obstruction. UA is consistent with UTI. Change Levaquin to cefepime given local antibiogram with quinolone resistance. Follow UCx Dementia: answered questions appropriately. Supportive care. At risk for delirium Elevated troponin: minimally elevated, remaining flat, in the setting of renal insufficiency. Appreciate cardiology evaluation. Agree with TTE. Adding ASA. Check FLP Leukocytosis: likely from infectious process. Follow cultures on abx HTN (hypertension): increase Norvasc. Add IV labetalol PRN Total time spent:  35 minutes - 11:05 am to 11:40 am 
Time spent in the care of this patient including reviewing records, discussing with nursing and/or other providers on the treatment team, obtaining history and examining the patient, and discussing treatment plans. Care Plan discussed with: Patient, Nursing Staff and >50% of time spent in counseling and coordination of care Discussed:  Care Plan and D/C Planning Prophylaxis:  Hep SQ Disposition:   PT, OT, RN Subjective: Chief Complaint:  Follow up enteritis Chart/notes/labs/studies reviewed, patient examined. Mild abdominal pain. No apparent diarrhea. No nausea. No f/c  
 
  
Objective:  
 
 
Vitals:  
 
  
Last 24hrs VS reviewed since prior progress note. Most recent are: 
 
Visit Vitals BP (!) 157/107 (BP 1 Location: Right arm, BP Patient Position: Sitting) Pulse 88 Temp 98.8 °F (37.1 °C) Resp 22 Wt 84.7 kg (186 lb 11.2 oz) SpO2 96% SpO2 Readings from Last 6 Encounters:  
04/30/20 96% Intake/Output Summary (Last 24 hours) at 4/30/2020 1532 Last data filed at 4/30/2020 1137 Gross per 24 hour Intake 2000 ml Output 50 ml Net 1950 ml Exam:  
 
Physical Exam: 
 
Gen:  Well-developed, well-nourished, in no acute distress HEENT:  Sclerae nonicteric, hearing intact to voice, mucous membranes moist 
Neck:  Supple, without masses. Resp:  No accessory muscle use, CTAB without wheezes, rales, or rhonchi 
Card: RRR, without m/r/g. No LE edema. Abd:  +bowel sounds, soft, mild TTP. No rebound, nondistended. Neuro: Face symmetric, tongue midline, speech fluent, follows commands appropriately Psych:  Alert, oriented to self and hospital. Limited insight Medications Reviewed: (see below) Lab Data Reviewed: (see below) 
 
______________________________________________________________________ Medications:  
 
Current Facility-Administered Medications Medication Dose Route Frequency  benzocaine (HURRICANE) 20 % spray   Mucous Membrane PRN  
 sodium chloride (NS) flush 5-40 mL  5-40 mL IntraVENous Q8H  
 sodium chloride (NS) flush 5-40 mL  5-40 mL IntraVENous PRN  
 acetaminophen (TYLENOL) tablet 650 mg  650 mg Oral Q4H PRN  
 morphine injection 1 mg  1 mg IntraVENous Q4H PRN  
 ondansetron (ZOFRAN) injection 4 mg  4 mg IntraVENous Q4H PRN  
 metroNIDAZOLE (FLAGYL) IVPB premix 500 mg  500 mg IntraVENous Q8H  
 heparin (porcine) injection 5,000 Units  5,000 Units SubCUTAneous Q8H  
 lactated Ringers infusion  100 mL/hr IntraVENous CONTINUOUS  
  [START ON 5/1/2020] amLODIPine (NORVASC) tablet 10 mg  10 mg Oral DAILY  [START ON 5/1/2020] cefepime (MAXIPIME) 2 g in 0.9% sodium chloride (MBP/ADV) 100 mL  2 g IntraVENous Q12H  
 labetaloL (NORMODYNE;TRANDATE) 20 mg/4 mL (5 mg/mL) injection 10 mg  10 mg IntraVENous Q4H PRN  
 [START ON 5/1/2020] aspirin delayed-release tablet 81 mg  81 mg Oral DAILY  ondansetron (ZOFRAN) injection 4 mg  4 mg IntraVENous Q1H PRN Lab Review:  
 
Recent Labs  
  04/29/20 
2201 WBC 17.0*  
HGB 15.5 HCT 46.9  Recent Labs 04/30/20 
1118 04/29/20 
2201  142  
K 3.3* 3.6 * 112* CO2 19* 20* * 133* BUN 33* 34* CREA 1.83* 2.24* CA 7.8* 8.1*  
MG  --  2.0 PHOS 3.0  --   
ALB 2.9* 2.9*  
SGOT  --  17 ALT  --  16 No components found for: [place] No results for input(s): PH, PCO2, PO2, HCO3, FIO2 in the last 72 hours. No results for input(s): INR, INREXT in the last 72 hours. No results found for: SDES No results found for: CULT 
        
___________________________________________________ Attending Physician: Ena Fay MD

## 2020-04-30 NOTE — CONSULTS
Reason for Consult: Positive Troponin. HPI: Tim Mendez is a 67 y.o. male with no significant past medical history is here for evaluation of symptoms of nausea, vomiting and diarrhea for the past 10 days. No symptoms of fever, chills, cough, or dysuria. No symptoms of chest pain, shortness of breath, lightheadedness, palpitations, dizziness, presyncope or syncope. No previous cardiac history. No previous cardiac testing. EKG at presentation demonstrated normal sinus rhythm with old inferior infarct with normal ST segment. Troponin on presentation 0.1 followed by 0.1. Creatinine at presentation was 2.24 followed by 1.83. Potassium was 3.6 followed by 3.3. Magnesium at presentation was 2.0. Hemoglobin is 15.5, WBC count is 17, platelet count is 319,124. Plan: 1. Positive troponin: Minimally positive and flat troponin. No symptoms of chest pain. EKG does not demonstrate any dynamic changes however there is old inferior infarct. Risk factors include age. Will check echocardiogram for any wall motion abnormalities. Minimally positive troponin could be from renal failure or stress or strain from gastroenteritis. .  May need a stress test as outpatient. Could start him on aspirin 81 mg p.o. daily. 2.  Hypertension: Since admission he has been hypertensive. Does not carry any history of hypertension. Does not take any medications at home. He has been started on amlodipine. Titrate medications as needed. 3.  CKD. Avoid nephrotoxins. Creatinine trending down. ATTENTION:  
This medical record was transcribed using an electronic medical records/speech recognition system. Although proofread, it may and can contain electronic, spelling and other errors. Corrections may be executed at a later time. Please feel free to contact us for any clarifications as needed. History reviewed. No pertinent past medical history. History reviewed. No pertinent surgical history. History reviewed. No pertinent family history. Social History Socioeconomic History  Marital status: SINGLE Spouse name: Not on file  Number of children: Not on file  Years of education: Not on file  Highest education level: Not on file Occupational History  Not on file Social Needs  Financial resource strain: Not on file  Food insecurity Worry: Not on file Inability: Not on file  Transportation needs Medical: Not on file Non-medical: Not on file Tobacco Use  Smoking status: Former Smoker  Smokeless tobacco: Never Used Substance and Sexual Activity  Alcohol use: Not on file  Drug use: Not on file  Sexual activity: Not on file Lifestyle  Physical activity Days per week: Not on file Minutes per session: Not on file  Stress: Not on file Relationships  Social connections Talks on phone: Not on file Gets together: Not on file Attends Church service: Not on file Active member of club or organization: Not on file Attends meetings of clubs or organizations: Not on file Relationship status: Not on file  Intimate partner violence Fear of current or ex partner: Not on file Emotionally abused: Not on file Physically abused: Not on file Forced sexual activity: Not on file Other Topics Concern  Not on file Social History Narrative  Not on file No Known Allergies Current Facility-Administered Medications Medication Dose Route Frequency Provider Last Rate Last Dose  lidocaine (URO-JET/ GLYDO) 2 % jelly   Urethral NOW Jazmine Curtis MD      
 benzocaine (HURRICANE) 20 % spray   Mucous Membrane PRN Jazmine Curtis MD   1 Spray at 04/30/20 1974  sodium chloride (NS) flush 5-40 mL  5-40 mL IntraVENous Q8H Melony Lemos MD   10 mL at 04/30/20 0505  sodium chloride (NS) flush 5-40 mL  5-40 mL IntraVENous PRN Job Andrew MD      
 acetaminophen (TYLENOL) tablet 650 mg  650 mg Oral Q4H PRN Job Andrew MD      
 morphine injection 1 mg  1 mg IntraVENous Q4H PRN Job Andrew MD      
 ondansetron [facility]) injection 4 mg  4 mg IntraVENous Q4H PRN Job Andrew MD   4 mg at 04/30/20 6007  metroNIDAZOLE (FLAGYL) IVPB premix 500 mg  500 mg IntraVENous Q8H Job Andrew  mL/hr at 04/30/20 0245 500 mg at 04/30/20 0245  amLODIPine (NORVASC) tablet 5 mg  5 mg Oral DAILY Job Andrew MD      
 heparin (porcine) injection 5,000 Units  5,000 Units SubCUTAneous Q8H Job Andrew MD      
 [START ON 5/2/2020] levoFLOXacin (LEVAQUIN) 750 mg in D5W IVPB  750 mg IntraVENous Q48H Job Andrew MD      
 lactated Ringers infusion  100 mL/hr IntraVENous CONTINUOUS Beverly Singer MD      
 ondansetron [facility]) injection 4 mg  4 mg IntraVENous Q1H PRN Nayana Chinchilla MD   4 mg at 04/29/20 2214 ROS: 
12 point review of systems was performed. All negative except for HPI Physical Exam: 
Visit Vitals BP (!) 160/99 Pulse 87 Temp 97.1 °F (36.2 °C) Resp 18 Wt 186 lb 11.2 oz (84.7 kg) SpO2 99% Gen:  Well-developed, well-nourished, in no acute distress HEENT:  Pink conjunctivae, PERRL, hearing intact to voice, moist mucous membranes Neck:  Supple, without masses, thyroid non-tender Resp:  No accessory muscle use, clear breath sounds without wheezes rales or rhonchi 
Card:  No murmurs, normal S1, S2 without thrills, bruits or peripheral edema Abd:  Soft, non-tender, non-distended, normoactive bowel sounds are present, no palpable organomegaly and no detectable hernias Lymph:  No cervical or inguinal adenopathy Musc:  No cyanosis or clubbing Skin:  No rashes or ulcers, skin turgor is good Neuro:  Cranial nerves are grossly intact, no focal motor weakness, follows commands appropriately Psych:  Good insight, oriented to person, place and time, alert Labs:  
 
Lab Results Component Value Date/Time WBC 17.0 (H) 04/29/2020 10:01 PM  
 HGB 15.5 04/29/2020 10:01 PM  
 HCT 46.9 04/29/2020 10:01 PM  
 PLATELET 115 38/36/8815 10:01 PM  
 MCV 92.0 04/29/2020 10:01 PM  
 
Lab Results Component Value Date/Time Glucose 133 (H) 04/29/2020 10:01 PM  
 Creatinine 2.24 (H) 04/29/2020 10:01 PM  
  
No results found for: CHOL, CHOLPOCT, HDL, LDL, LDLC, LDLCPOC, LDLCEXT, TRIGL, TGLPOCT, CHHD, CHHDX Lab Results Component Value Date/Time ALT (SGPT) 16 04/29/2020 10:01 PM  
 AST (SGOT) 17 04/29/2020 10:01 PM  
 Alk. phosphatase 69 04/29/2020 10:01 PM  
 Bilirubin, total 1.0 04/29/2020 10:01 PM  
 Albumin 2.9 (L) 04/29/2020 10:01 PM  
 Protein, total 7.2 04/29/2020 10:01 PM  
 PLATELET 217 36/96/9604 10:01 PM  
 
No results found for: INR, INREXT, PTMR, PTP, PT1, PT2, INREXT Lab Results Component Value Date/Time GFR est non-AA 28 (L) 04/29/2020 10:01 PM  
 GFR est AA 35 (L) 04/29/2020 10:01 PM  
 Creatinine 2.24 (H) 04/29/2020 10:01 PM  
 BUN 34 (H) 04/29/2020 10:01 PM  
 Sodium 142 04/29/2020 10:01 PM  
 Potassium 3.6 04/29/2020 10:01 PM  
 Chloride 112 (H) 04/29/2020 10:01 PM  
 CO2 20 (L) 04/29/2020 10:01 PM  
 Magnesium 2.0 04/29/2020 10:01 PM  
 
No results found for: PSA, [place], PSAR3, [id number], [id number], PSALT No results found for: TSH, TSH2, TSH3, TSHP, TSHELE, TSHEXT, TT3, T3U, T3UP, FRT3, FT3, FT4, FT4P, T4, T4P, FT4T, TT7, TSHEXT Lab Results Component Value Date/Time Glucose 133 (H) 04/29/2020 10:01 PM  
  
Lab Results Component Value Date/Time Troponin-I, Qt. 0.10 (H) 04/30/2020 05:18 AM  
  
No results found for: BNP, BNPP, BNPPPOC, XBNPT, BNPNT Lab Results Component Value Date/Time  Sodium 142 04/29/2020 10:01 PM  
 Potassium 3.6 04/29/2020 10:01 PM  
 Chloride 112 (H) 04/29/2020 10:01 PM  
 CO2 20 (L) 04/29/2020 10:01 PM  
 Anion gap 10 04/29/2020 10:01 PM  
 Glucose 133 (H) 04/29/2020 10:01 PM  
 BUN 34 (H) 04/29/2020 10:01 PM  
 Creatinine 2.24 (H) 04/29/2020 10:01 PM  
 BUN/Creatinine ratio 15 04/29/2020 10:01 PM  
 GFR est AA 35 (L) 04/29/2020 10:01 PM  
 GFR est non-AA 28 (L) 04/29/2020 10:01 PM  
 Calcium 8.1 (L) 04/29/2020 10:01 PM  
  
Lab Results Component Value Date/Time Sodium 142 04/29/2020 10:01 PM  
 Potassium 3.6 04/29/2020 10:01 PM  
 Chloride 112 (H) 04/29/2020 10:01 PM  
 CO2 20 (L) 04/29/2020 10:01 PM  
 Anion gap 10 04/29/2020 10:01 PM  
 Glucose 133 (H) 04/29/2020 10:01 PM  
 BUN 34 (H) 04/29/2020 10:01 PM  
 Creatinine 2.24 (H) 04/29/2020 10:01 PM  
 BUN/Creatinine ratio 15 04/29/2020 10:01 PM  
 GFR est AA 35 (L) 04/29/2020 10:01 PM  
 GFR est non-AA 28 (L) 04/29/2020 10:01 PM  
 Calcium 8.1 (L) 04/29/2020 10:01 PM  
 Bilirubin, total 1.0 04/29/2020 10:01 PM  
 ALT (SGPT) 16 04/29/2020 10:01 PM  
 AST (SGOT) 17 04/29/2020 10:01 PM  
 Alk. phosphatase 69 04/29/2020 10:01 PM  
 Protein, total 7.2 04/29/2020 10:01 PM  
 Albumin 2.9 (L) 04/29/2020 10:01 PM  
 Globulin 4.3 (H) 04/29/2020 10:01 PM  
 A-G Ratio 0.7 (L) 04/29/2020 10:01 PM  
  
No results found for: HBA1C, VOH7MGCI, HGBE8, HUZ0ONSU, OYD8RCNR Recent Labs 04/30/20 
0518 TROIQ 0.10* Problem List:  
 
Problem List  Date Reviewed: 4/30/2020 Codes Class Noted Enteritis ICD-10-CM: K52.9 ICD-9-CM: 558.9  4/30/2020 Renal insufficiency ICD-10-CM: N28.9 ICD-9-CM: 593.9  4/30/2020 Dementia ([address] ICD-10-CM: F03.90 ICD-9-CM: 294.20  4/30/2020 Elevated troponin ICD-10-CM: R79.89 ICD-9-CM: 790.6  4/30/2020 Leukocytosis ICD-10-CM: O39.106 ICD-9-CM: 288.60  4/30/2020 HTN (hypertension) ICD-10-CM: I10 
ICD-9-CM: 401.9  4/30/2020 Santiago Ulrich MD, [facility]

## 2020-04-30 NOTE — CONSULTS
Alysa Marte. Stan Chaney, [address] [place] [address] 
[phone] office 
[phone] voicemail Gastroenterology Consultation Note Admit Date: 4/29/2020 Consult Date: 4/30/2020 I greatly appreciate your asking me to see Kendra Browning, thank you very much for the opportunity to participate in his care. Narrative Assessment and Plan · N/V 
· Ileus/gastroenterocolitis noted on CT · Abnormal rectum on CT and exam reveals · Firm, nodular, non mobile mass of rectum seems most c/w rectal carcinoma · C/O esophageal/throat pain likely related to regurgitation/vomiting - PPI and carafate Flex sig tomorrow to visualize the rectal finding noted on CT and my physical exam today. He is agreeable. Subjective: Chief Complaint: Nausea, Vomiting History of Present Illness: Also complains of throat pain and pain on swallowing. Notes no bleeding. Regurgitation and vomiting. Last colonoscopy > 10 years ago. PCP: 
Martha Narayan MD 
 
History reviewed. No pertinent past medical history. History reviewed. No pertinent surgical history. Social History Tobacco Use  Smoking status: Former Smoker  Smokeless tobacco: Never Used Substance Use Topics  Alcohol use: Not on file History reviewed. No pertinent family history. No Known Allergies Home Medications: 
None Hospital Medications: 
Current Facility-Administered Medications Medication Dose Route Frequency  benzocaine (HURRICANE) 20 % spray   Mucous Membrane PRN  
 sodium chloride (NS) flush 5-40 mL  5-40 mL IntraVENous Q8H  
 sodium chloride (NS) flush 5-40 mL  5-40 mL IntraVENous PRN  
 acetaminophen (TYLENOL) tablet 650 mg  650 mg Oral Q4H PRN  
 morphine injection 1 mg  1 mg IntraVENous Q4H PRN  
 ondansetron (ZOFRAN) injection 4 mg  4 mg IntraVENous Q4H PRN  
 metroNIDAZOLE (FLAGYL) IVPB premix 500 mg  500 mg IntraVENous Q8H  
  heparin (porcine) injection 5,000 Units  5,000 Units SubCUTAneous Q8H  
 lactated Ringers infusion  100 mL/hr IntraVENous CONTINUOUS  
 [START ON 5/1/2020] amLODIPine (NORVASC) tablet 10 mg  10 mg Oral DAILY  [START ON 5/1/2020] cefepime (MAXIPIME) 2 g in 0.9% sodium chloride (MBP/ADV) 100 mL  2 g IntraVENous Q12H  
 labetaloL (NORMODYNE;TRANDATE) 20 mg/4 mL (5 mg/mL) injection 10 mg  10 mg IntraVENous Q4H PRN  
 [START ON 5/1/2020] aspirin delayed-release tablet 81 mg  81 mg Oral DAILY  ondansetron (ZOFRAN) injection 4 mg  4 mg IntraVENous Q1H PRN Review of Systems: Admission ROS by Darrick Andino MD from 4/29/2020 were reviewed with the patient and changes (other than per HPI) include: none Objective:  
 
Physical Exam: 
Visit Vitals BP (!) 157/107 (BP 1 Location: Right arm, BP Patient Position: Sitting) Pulse 85 Temp 98.8 °F (37.1 °C) Resp 22 Wt 84.7 kg (186 lb 11.2 oz) SpO2 96% SpO2 Readings from Last 6 Encounters:  
04/30/20 96% Intake/Output Summary (Last 24 hours) at 4/30/2020 1535 Last data filed at 4/30/2020 1137 Gross per 24 hour Intake 2000 ml Output 50 ml Net 1950 ml General: no distress, comfortable Skin:  No rash or palpable dermatologic mass lesions HEENT: Pupils equal, sclera anicteric, oropharynx with no gross lesions Cardiovascular: No abnormal audible heart sounds, well perfused, no edema Respiratory:  No abnormal audible breath sounds, normal respiratory effort, no throacic deformity GI:   Abdomen nondistended, nontender, no mass, no free fluid, no rebound or guarding. Rectum: palpable firm lobular rock hard mucosa in rectal vault. Non obstructing. Musculoskeletal:  No skeletal deformity nor acute arthritis noted. Neurological:  Motor and sensory function intact in upper extremeties Psychiatric:  Normal affect, memory intact, appears to have insight into current illness Lymphatic:  No cervical, supraclavicular, or periumbilic lymphadenopathy Laboratory:   
Recent Results (from the past 24 hour(s)) SAMPLES BEING HELD Collection Time: 04/29/20 10:01 PM  
Result Value Ref Range SAMPLES BEING HELD 1SST,1BLUE   
 COMMENT Add-on orders for these samples will be processed based on acceptable specimen integrity and analyte stability, which may vary by analyte. CBC WITH AUTOMATED DIFF Collection Time: 04/29/20 10:01 PM  
Result Value Ref Range WBC 17.0 (H) 4.1 - 11.1 K/uL  
 RBC 5.10 4. 10 - 5.70 M/uL  
 HGB 15.5 12.1 - 17.0 g/dL HCT 46.9 36.6 - 50.3 % MCV 92.0 80.0 - 99.0 FL  
 MCH 30.4 26.0 - 34.0 PG  
 MCHC 33.0 30.0 - 36.5 g/dL  
 RDW 14.5 11.5 - 14.5 % PLATELET 846 389 - 569 K/uL MPV 10.4 8.9 - 12.9 FL  
 NRBC 0.0 0  WBC ABSOLUTE NRBC 0.00 0.00 - 0.01 K/uL NEUTROPHILS 79 (H) 32 - 75 % BAND NEUTROPHILS 5 0 - 6 % LYMPHOCYTES 12 12 - 49 % MONOCYTES 4 (L) 5 - 13 % EOSINOPHILS 0 0 - 7 % BASOPHILS 0 0 - 1 % IMMATURE GRANULOCYTES 0 %  
 ABS. NEUTROPHILS 14.3 (H) 1.8 - 8.0 K/UL  
 ABS. LYMPHOCYTES 2.0 0.8 - 3.5 K/UL  
 ABS. MONOCYTES 0.7 0.0 - 1.0 K/UL  
 ABS. EOSINOPHILS 0.0 0.0 - 0.4 K/UL  
 ABS. BASOPHILS 0.0 0.0 - 0.1 K/UL  
 ABS. IMM. GRANS. 0.0 K/UL  
 DF MANUAL    
 RBC COMMENTS NORMOCYTIC, NORMOCHROMIC    
 WBC COMMENTS TOXIC GRANULATION    
METABOLIC PANEL, COMPREHENSIVE Collection Time: 04/29/20 10:01 PM  
Result Value Ref Range Sodium 142 136 - 145 mmol/L Potassium 3.6 3.5 - 5.1 mmol/L Chloride 112 (H) 97 - 108 mmol/L  
 CO2 20 (L) 21 - 32 mmol/L Anion gap 10 5 - 15 mmol/L Glucose 133 (H) 65 - 100 mg/dL BUN 34 (H) 6 - 20 MG/DL Creatinine 2.24 (H) 0.70 - 1.30 MG/DL  
 BUN/Creatinine ratio 15 12 - 20 GFR est AA 35 (L) >60 ml/min/1.73m2 GFR est non-AA 28 (L) >60 ml/min/1.73m2 Calcium 8.1 (L) 8.5 - 10.1 MG/DL  Bilirubin, total 1.0 0.2 - 1.0 MG/DL  
 ALT (SGPT) 16 12 - 78 U/L  
 AST (SGOT) 17 15 - 37 U/L Alk. phosphatase 69 45 - 117 U/L Protein, total 7.2 6.4 - 8.2 g/dL Albumin 2.9 (L) 3.5 - 5.0 g/dL Globulin 4.3 (H) 2.0 - 4.0 g/dL A-G Ratio 0.7 (L) 1.1 - 2.2    
TROPONIN I Collection Time: 04/29/20 10:01 PM  
Result Value Ref Range Troponin-I, Qt. 0.10 (H) <0.05 ng/mL MAGNESIUM Collection Time: 04/29/20 10:01 PM  
Result Value Ref Range Magnesium 2.0 1.6 - 2.4 mg/dL LIPASE Collection Time: 04/29/20 10:01 PM  
Result Value Ref Range Lipase 156 73 - 393 U/L  
EKG, 12 LEAD, INITIAL Collection Time: 04/29/20 10:01 PM  
Result Value Ref Range Ventricular Rate 94 BPM  
 Atrial Rate 94 BPM  
 P-R Interval 162 ms QRS Duration 70 ms Q-T Interval 380 ms QTC Calculation (Bezet) 475 ms Calculated P Axis 33 degrees Calculated R Axis -19 degrees Calculated T Axis 91 degrees Diagnosis Normal sinus rhythm Possible Left atrial enlargement Inferior infarct , age undetermined Abnormal ECG No previous ECGs available Confirmed by Marcial PORTER, Davis Mcarthur ([id number]) on 4/30/2020 8:46:55 AM 
  
LACTIC ACID Collection Time: 04/30/20  5:18 AM  
Result Value Ref Range Lactic acid 1.1 0.4 - 2.0 MMOL/L  
TROPONIN I Collection Time: 04/30/20  5:18 AM  
Result Value Ref Range Troponin-I, Qt. 0.10 (H) <0.05 ng/mL TROPONIN I Collection Time: 04/30/20 11:18 AM  
Result Value Ref Range Troponin-I, Qt. 0.11 (H) <0.05 ng/mL RENAL FUNCTION PANEL Collection Time: 04/30/20 11:18 AM  
Result Value Ref Range Sodium 145 136 - 145 mmol/L Potassium 3.3 (L) 3.5 - 5.1 mmol/L Chloride 115 (H) 97 - 108 mmol/L  
 CO2 19 (L) 21 - 32 mmol/L Anion gap 11 5 - 15 mmol/L Glucose 109 (H) 65 - 100 mg/dL BUN 33 (H) 6 - 20 MG/DL Creatinine 1.83 (H) 0.70 - 1.30 MG/DL  
 BUN/Creatinine ratio 18 12 - 20 GFR est AA 44 (L) >60 ml/min/1.73m2 GFR est non-AA 36 (L) >60 ml/min/1.73m2  Calcium 7.8 (L) 8.5 - 10.1 MG/DL  
 Phosphorus 3.0 2.6 - 4.7 MG/DL Albumin 2.9 (L) 3.5 - 5.0 g/dL CREATININE, UR, RANDOM Collection Time: 04/30/20 11:31 AM  
Result Value Ref Range Creatinine, urine 280.00 mg/dL EOSINOPHILS, URINE Collection Time: 04/30/20 11:31 AM  
Result Value Ref Range Eosinophils,urine Negative PROTEIN URINE, RANDOM Collection Time: 04/30/20 11:31 AM  
Result Value Ref Range Protein, urine random 120 (H) 0.0 - 11.9 mg/dL URINALYSIS W/ REFLEX CULTURE Collection Time: 04/30/20 11:32 AM  
Result Value Ref Range Color DARK YELLOW Appearance TURBID (A) CLEAR Specific gravity 1.028 1.003 - 1.030    
 pH (UA) 6.0 5.0 - 8.0 Protein 30 (A) NEG mg/dL Glucose Negative NEG mg/dL Ketone TRACE (A) NEG mg/dL Blood SMALL (A) NEG Urobilinogen 1.0 0.2 - 1.0 EU/dL Nitrites Negative NEG Leukocyte Esterase MODERATE (A) NEG    
 WBC >100 (H) 0 - 4 /hpf  
 RBC 5-10 0 - 5 /hpf Epithelial cells FEW FEW /lpf Bacteria 4+ (A) NEG /hpf  
 UA:UC IF INDICATED URINE CULTURE ORDERED (A) CNI Granular cast 0-2 (A) NEG /lpf SODIUM, UR, RANDOM Collection Time: 04/30/20 11:32 AM  
Result Value Ref Range Sodium,urine random 22 MMOL/L  
BILIRUBIN, CONFIRM Collection Time: 04/30/20 11:32 AM  
Result Value Ref Range Bilirubin UA, confirm Negative NEG Assessment/Plan: Active Problems: 
  Enteritis (4/30/2020) Renal insufficiency (4/30/2020) Dementia ([facility] [address] (4/30/2020) Elevated troponin (4/30/2020) Leukocytosis (4/30/2020) HTN (hypertension) (4/30/2020) See above narrative for full detail.

## 2020-04-30 NOTE — PROGRESS NOTES
Bedside and Verbal shift change report given to Dustin Villasenor RN (oncoming nurse) by Es Scanlon RN (offgoing nurse). Report included the following information SBAR, Kardex, Intake/Output, MAR, Accordion and Recent Results.

## 2020-04-30 NOTE — H&P
History & Physical 
 
Date of admission: 4/29/2020 Patient name: Giovanny Mcneal MRN: [id number] YOB: 1940 Age: 67 y.o. Primary care provider:  Katia Rogers MD  
 
Source of Information: daughter, medical record Chief complain: nausea, vomiting, diarrhea History of present illness Giovanny Mcneal is a 67 y.o. male with early onset dementia who presents with nausea, vomiting and diarrhea for the past 10 days. Daughter called his PCP and was diagnosed with gastroenteritis and given something for nausea. No fever, but had very decreased intake by mouth for the past week per daughter. Weak and fatigued. Vomiting and diarrhea eased up. Per daughter it seems like he lost a lot of weight based on how his clothes were fitting. Intermittent cough from clearing throat. No shortness of breath or chest pain. History was obtained from daughter as patient is not a reliable historian. History reviewed. No pertinent past medical history. History reviewed. Abdominal surgery, patient cannot elaborate further Prior to Admission medications Not on File No Known Allergies History reviewed. No pertinent family history. Family history reviewed and non-contributory. Social history Patient resides Independently   
x  With family care Assisted living SNF Ambulates 
x  Independently With cane Assisted walker Alcohol history  
x  None Social  
  Chronic Smoking history 
x  None Former smoker Current smoker Social History Tobacco Use Smoking Status Former Smoker Smokeless Tobacco Never Used Code status 
x  Full code DNR/DNI Code status discussed with the patient/caregivers. No Order Review of systems The patient denies any fever, chills, chest pain, cough, congestion, recent illness, palpitations, or dysuria. A comprehensive review of systems was negative except for that written in the History of Present Illness. The remainder of the review of systems was reviewed and is noncontributory. Physical Examination Visit Vitals BP (!) 153/101 Pulse 89 Temp 98.7 °F (37.1 °C) Resp 16 SpO2 100% O2 Device: Room air General:  Alert, cooperative, no distress Head:  Normocephalic, without obvious abnormality, atraumatic Eyes:  Conjunctivae/corneas clear. PERRL, EOMs intact E/N/M/T: Nares normal. Septum midline. No nasal drainage or sinus tenderness Lips, mucosa, and tongue normal  
Teeth and gums normal 
Clear oropharynx Neck: Normal appearance and movements, symmetrical, trachea midline No palpable adenopathy No thyroid enlargement, tenderness or nodules No carotid bruit Normal JVP Lungs:   Symmetrical chest expansion and respiratory effort Clear to auscultation bilaterally Chest wall:  No tenderness or deformity Heart:  Regular rhythm Sounds normal; no murmur, click, rub or gallop Abdomen:   Soft, no tenderness Bowel sounds normal 
No masses or hepatosplenomegaly No hernias present Back: No CVA tenderness Extremities: Extremities normal, atraumatic No cyanosis or edema No DVT signs Pulses 2+ and symmetric all extremities Skin: No rashes or ulcers Musculo- 
    skeletal: Gait not tested Normal symmetry, ROM, strength and tone Neuro: Normal cranial nerves Normal reflexes and sensation Data Review 24 Hour Results: 
Recent Results (from the past 24 hour(s)) SAMPLES BEING HELD Collection Time: 04/29/20 10:01 PM  
Result Value Ref Range SAMPLES BEING HELD 1SST,1BLUE   
 COMMENT Add-on orders for these samples will be processed based on acceptable specimen integrity and analyte stability, which may vary by analyte. CBC WITH AUTOMATED DIFF  Collection Time: 04/29/20 10:01 PM  
 Result Value Ref Range WBC 17.0 (H) 4.1 - 11.1 K/uL  
 RBC 5.10 4. 10 - 5.70 M/uL  
 HGB 15.5 12.1 - 17.0 g/dL HCT 46.9 36.6 - 50.3 % MCV 92.0 80.0 - 99.0 FL  
 MCH 30.4 26.0 - 34.0 PG  
 MCHC 33.0 30.0 - 36.5 g/dL  
 RDW 14.5 11.5 - 14.5 % PLATELET 740 928 - 034 K/uL MPV 10.4 8.9 - 12.9 FL  
 NRBC 0.0 0  WBC ABSOLUTE NRBC 0.00 0.00 - 0.01 K/uL NEUTROPHILS 79 (H) 32 - 75 % BAND NEUTROPHILS 5 0 - 6 % LYMPHOCYTES 12 12 - 49 % MONOCYTES 4 (L) 5 - 13 % EOSINOPHILS 0 0 - 7 % BASOPHILS 0 0 - 1 % IMMATURE GRANULOCYTES 0 %  
 ABS. NEUTROPHILS 14.3 (H) 1.8 - 8.0 K/UL  
 ABS. LYMPHOCYTES 2.0 0.8 - 3.5 K/UL  
 ABS. MONOCYTES 0.7 0.0 - 1.0 K/UL  
 ABS. EOSINOPHILS 0.0 0.0 - 0.4 K/UL  
 ABS. BASOPHILS 0.0 0.0 - 0.1 K/UL  
 ABS. IMM. GRANS. 0.0 K/UL  
 DF MANUAL    
 RBC COMMENTS NORMOCYTIC, NORMOCHROMIC    
 WBC COMMENTS TOXIC GRANULATION    
METABOLIC PANEL, COMPREHENSIVE Collection Time: 04/29/20 10:01 PM  
Result Value Ref Range Sodium 142 136 - 145 mmol/L Potassium 3.6 3.5 - 5.1 mmol/L Chloride 112 (H) 97 - 108 mmol/L  
 CO2 20 (L) 21 - 32 mmol/L Anion gap 10 5 - 15 mmol/L Glucose 133 (H) 65 - 100 mg/dL BUN 34 (H) 6 - 20 MG/DL Creatinine 2.24 (H) 0.70 - 1.30 MG/DL  
 BUN/Creatinine ratio 15 12 - 20 GFR est AA 35 (L) >60 ml/min/1.73m2 GFR est non-AA 28 (L) >60 ml/min/1.73m2 Calcium 8.1 (L) 8.5 - 10.1 MG/DL Bilirubin, total 1.0 0.2 - 1.0 MG/DL  
 ALT (SGPT) 16 12 - 78 U/L  
 AST (SGOT) 17 15 - 37 U/L Alk. phosphatase 69 45 - 117 U/L Protein, total 7.2 6.4 - 8.2 g/dL Albumin 2.9 (L) 3.5 - 5.0 g/dL Globulin 4.3 (H) 2.0 - 4.0 g/dL A-G Ratio 0.7 (L) 1.1 - 2.2    
TROPONIN I Collection Time: 04/29/20 10:01 PM  
Result Value Ref Range Troponin-I, Qt. 0.10 (H) <0.05 ng/mL MAGNESIUM Collection Time: 04/29/20 10:01 PM  
Result Value Ref Range Magnesium 2.0 1.6 - 2.4 mg/dL LIPASE  Collection Time: 04/29/20 10:01 PM  
 Result Value Ref Range Lipase 156 73 - 393 U/L Recent Labs  
  04/29/20 
2201 WBC 17.0*  
HGB 15.5 HCT 46.9  Recent Labs  
  04/29/20 
2201   
K 3.6 * CO2 20* * BUN 34* CREA 2.24* CA 8.1*  
MG 2.0 ALB 2.9* TBILI 1.0  
SGOT 17 ALT 16 Imaging Xr Abd (kub) Result Date: 4/29/2020 Clinical history: fatigue INDICATION:   fatigue FINDINGS: Supine view of the abdomen is obtained. There is no abnormal mass, calcification. Extensive small and large bowel distention is noted. Left hip arthroplasty. IMPRESSION: Gaseous small and large bowel distention. Ct Abd Pelv Wo Cont Result Date: 4/30/2020 EXAM: TEMPORARY CLINICAL HISTORY: Leukocytosis, INDICATION: Leukocytosis, fatigue COMPARISON: None. CONTRAST: None TECHNIQUE: Multislice helical CT was performed of the abdomen and pelvis . Oral contrast was not administered. Contiguous 5 mm axial images were reconstructed and lung and soft tissue windows were generated. Coronal and sagittal reformations were generated. CT dose reduction was achieved through use of a standardized protocol tailored for this examination and automatic exposure control for dose modulation. The absence of IV contrast limits evaluation for the presence of mass lesion/collection/vascular abnormality in the solid organs and in the hollow visceral structures as well. FINDINGS: LUNG BASES:No large mass lesion or effusion. LIVER: Heterogenous in appearance. Nodular in contour. Hepatic hypodensity inferior right hepatic lobe 2-35 not characterized. There is no intrahepatic duct dilatation. GALLBLADDER:  Cholelithiasis SPLEEN/PANCREAS: No mass. There is no pancreatic duct dilatation. There is no evidence of splenomegaly. ADRENALS/KIDNEYS: Right adrenal adenoma. Left renal hypodensities are consistent with cysts. .  There is no hydronephrosis. There is no perinephric mass.  STOMACH, COLON AND SMALL BOWEL: Small hiatal hernia. Mild gastric distention. Diffuse large and small bowel distention. Fluid-filled loops of small bowel. There is circumferential extensive rectal wall thickening. There is no free intraperitoneal air. There is no evidence of incarceration . No mesenteric adenopathy. PERITONEUM: Unremarkable. APPENDIX: Not visualized BLADDER/REPRODUCTIVE ORGANS: There is circumferential bladder wall thickening. The prostate demonstrates calcifications. OSSEOUS STRUCTURES: Severe degenerative change of the lumbar spine with severe canal stenosis at L5-S1. Severe foraminal stenosis L5-S1 as well. Left hip arthroplasty. RETROPERITONEUM: Aortic atherosclerotic change. The abdominal aorta is normal in caliber. No aneurysm. No retroperitoneal adenopathy. IMPRESSION: Extensive small and large bowel distention. Fluid-filled loops of small and large bowel. The absence of IV contrast limits evaluation, consider severe enteritis. Distal circumferential rectal wall thickening may be related to infectious process, neoplastic etiology is not excluded. Cholelithiasis. Circumferential bladder wall thickening most likely related to chronic outlet obstruction. Neoplastic etiologies deemed less likely. Additional incidental findings are as described above. Xr Chest [facility] Result Date: 4/29/2020 PORTABLE CHEST RADIOGRAPH/S: 4/29/2020 11:20 PM Clinical history: fatigue INDICATION:   fatigue COMPARISON: None FINDINGS: AP portable upright view of the chest demonstrates a stable  cardiopericardial silhouette. The lungs are adequately expanded. There is no edema, effusion, consolidation, or pneumothorax. The osseous structures are unremarkable. Patient is on a cardiac monitor. IMPRESSION: No acute process. Assessment and Plan Enteritis 
-will get stool studies 
-levaquin and flagyl -IVF 
-clear liquids for now 
-will obtain lactic acid level 
-repeat KUB tomorrow Leukocytosis -start antibiotics  
-IVF 
-monitor Elevated troponin 
-will trend 
-no c/o chest pain Elevated creatinine 
-unsure at this time if acute or chronic renal insufficiency 
-will hyrdate and monitor bmp Hypertension 
-will start on amlodipine Dementia 
-not on meds Weakness 
-from poor oral intake 
-will consult PT/OT 
-will probably need placement Diet: clear liquid Activity: as tolerated DVT prophylaxis: heparin Isolation precautions: none Consultations: none Anticipated disposition: home Signed by:  Sandra Sears MD  
 April 30, 2020 at 12:51 AM

## 2020-04-30 NOTE — PROGRESS NOTES
Reason for Admission:   Nausea ,vomiting,diarrhea,decreased po intake,enteritis RUR Score:          15% Plan for utilizing home health:       
 
PCP: First and Last name:  Dr Ambrocio Guido Name of Practice: [address] Are you a current patient: Yes/No:  yes Approximate date of last visit: Daughter states pt has not been to PCP \"in a long time\" Current Advanced Directive/Advance Care Plan:  
                      
Transition of Care Plan: Pt was admitted to ICU with nausea,vomiting and diarrhea persisting x one week prior to admission. As pt.'s has dementia and is a poor historian,I contacted his daughter Baljinder Wills @ [phone]. Daughter states that pt has not been officially evaluated by a neuropsychiatrist yet but his memory is impaired, Typically,pt walks independently @ home and can perform simple chores such as taking the trash out. Pt does not use a walker or cane to ambulate. He does not have any other DME @ home. Daughter states she realized that her father needed assistance when he became rapidly debilitated @ home. Daughter states she realizes her father may need home health or rehab of some form and is amenable to what PT and OT prescribe. I informed daughter that I will follow-up with her tomorrow after PT has evaluated pt so we can discuss options for pt.  
 
Everton Ruffin

## 2020-04-30 NOTE — ROUTINE PROCESS
Required documentation not completed at this time due to patient feeling nausea and requesting to rest.

## 2020-04-30 NOTE — PROGRESS NOTES
Problem: Falls - Risk of 
Goal: *Absence of Falls Description: Document [place] Flow Fall Risk and appropriate interventions in the flowsheet. Outcome: Progressing Towards Goal 
Note: Fall Risk Interventions: 
  
 
  
 
  
 
Elimination Interventions: Bed/chair exit alarm, Call light in reach, Urinal in reach

## 2020-04-30 NOTE — ED NOTES
TRANSFER - OUT REPORT: 
 
Verbal report given to Terry Calvo RN(name) on Max Marie  being transferred to 3rd floor(unit) for routine progression of care Report consisted of patients Situation, Background, Assessment and  
Recommendations(SBAR). Information from the following report(s) SBAR, ED Summary and Recent Results was reviewed with the receiving nurse. Lines:  
Peripheral IV 04/29/20 Right Antecubital (Active) Site Assessment Clean, dry, & intact 4/29/2020 10:05 PM  
Phlebitis Assessment 0 4/29/2020 10:05 PM  
Infiltration Assessment 0 4/29/2020 10:05 PM  
Dressing Status Clean, dry, & intact 4/29/2020 10:05 PM  
Dressing Type Transparent 4/29/2020 10:05 PM  
Hub Color/Line Status Pink 4/29/2020 10:05 PM  
  
 
Opportunity for questions and clarification was provided. Patient transported with: 
 Monitor Registered Nurse

## 2020-05-01 NOTE — PERIOP NOTES
Lucy Massey 1940 
[id number]  
 
 
8032 Situation: 
 
Scheduled Procedure: Procedure(s): SIGMOIDOSCOPY FLEXIBLE Verbal report received from: Kermit Payne RN 
Preoperative diagnosis: rectal mass Background: 
 
Procedure: Procedure(s): SIGMOIDOSCOPY FLEXIBLE Physician performing procedure; Dr. Elver Real, GI 
 
SBAR QUESTIONS FLOOR TO ENDO RN 
 
NPO Status/Last PO Intake: before midnight Pregnancy Test:Not applicable If yes, result: none Is the patient taking Blood Thinners: YES If yes, list: Heparin and last taken 5/1/20 3102 Is the patient diabetic:no If yes, what was the last BS:    Time taken? Anything given? no          
Does the patient have a Pacemaker/Defibrillator in place?: no  
Does the patient need antibiotics before/during/after procedure: no If the patient is having a colon, How much prep was drank? Enema prep failed. could not pass rectum. What were the Colon prep results? Last bm 5/1  1330 Pt reported \"solid\" stool. Does the patient have SCD in place:no Is patient on CONTACT precautions:no If yes, what kind of CONTACT precautions:  
 
Assessment: 
Are the vital signs stable prior to patient coming to ENDO?  yes Is the patient alert/oriented and able to sign consent for the procedures:yes reported by this nurse. Notes indicate dementia. Will call daughter for consent. How does the patient's abdomen feel prior to coming to ENDO? round and soft yes Does the patient have a patient IV in place? Yes, fluids running. Recommendation: 
Family or Friend present no Permission to share finding with Family or Friend:  Yes. Daughter, POA. [address] Spoke w/Dr. Elver Real who will call daughter and Dmitry Walters [phone] to go over procedure after CT findings. Dr. Jorge Schilder, Anesthesiology and Jae Marrufo, RN and this nurse obtained telephone consent for procedure with MsGisel Deion Rama. [address] Anesthesia staff at patient's bedside administering anesthesia and monitoring patients vital signs throughout procedure. See anesthesia note. [address] Endoscope was pre-cleaned at bedside immediately following procedure by Marielena Ordonez endo tech. [phone] Patient tolerated procedure without problems. Abdomen soft and patient arousable and voices no complaints Report received from CRNA, see anesthesia note. Patient transported to endoscopy recovery area. Report given to Terrell Persaud RN.  
 
1063 TRANSFER - OUT REPORT: 
 
Verbal report given to Mario Felix RN on Sean Slade  being transferred to [facility] [phone] for routine progression of care Report consisted of patients Situation, Background, Assessment and  
Recommendations(SBAR). Information from the following report(s) SBAR was reviewed with the receiving nurse. Lines:  
Peripheral IV 05/01/20 Anterior;Right Forearm (Active) Site Assessment Clean, dry, & intact 5/1/2020  3:02 PM  
Phlebitis Assessment 0 5/1/2020  3:02 PM  
Infiltration Assessment 0 5/1/2020  3:02 PM  
Dressing Status Clean, dry, & intact 5/1/2020  3:02 PM  
Dressing Type Tape;Transparent 5/1/2020  3:02 PM  
Hub Color/Line Status Pink 5/1/2020  3:02 PM  
Action Taken Open ports on tubing capped 5/1/2020  3:02 PM  
Alcohol Cap Used Yes 5/1/2020  3:02 PM  
  
 
Opportunity for questions and clarification was provided. Patient transported with: 
 Pt chart, patent IV. Procedure mask on pt.

## 2020-05-01 NOTE — PROGRESS NOTES
TRANSFER - IN REPORT: 
 
Verbal report received from [facility] (name) on Seth Avalos  being received from endo (unit) for routine post - op Report consisted of patients Situation, Background, Assessment and  
Recommendations(SBAR). Information from the following report(s) SBAR, Kardex and Procedure Summary was reviewed with the receiving nurse. Opportunity for questions and clarification was provided. Assessment completed upon patients arrival to unit and care assumed.

## 2020-05-01 NOTE — PROGRESS NOTES
Problem: Mobility Impaired (Adult and Pediatric) Goal: *Acute Goals and Plan of Care (Insert Text) Description: FUNCTIONAL STATUS PRIOR TO ADMISSION: Patient was modified independent for functional mobility and ADLs per patient, still drives. HOME SUPPORT PRIOR TO ADMISSION: The patient lived with family members but did not require assist. 
 
Physical Therapy Goals Initiated 5/1/2020 1. Patient will move from supine to sit and sit to supine , scoot up and down and roll side to side in bed with independence within 7 day(s). 2.  Patient will transfer from bed to chair and chair to bed with independence using the least restrictive device within 7 day(s). 3.  Patient will perform sit <> stand with independence within 7 day(s). 4.  Patient will ambulate with independence for 150 feet with the least restrictive device within 7 day(s). Note: PHYSICAL THERAPY EVALUATION Patient: Pollo Stover (33 y.o. male) Date: 5/1/2020 Primary Diagnosis: Enteritis [K52.9] Procedure(s) (LRB): 
SIGMOIDOSCOPY FLEXIBLE (N/A) COLON BIOPSY (N/A) Day of Surgery Precautions:  Contact, Fall ASSESSMENT Based on the objective data described below, the patient presents with decreased dynamic standing balance, decreased tolerance for activity, impaired insight and safety judgement, and mildly slowed processing. Patient just returned from endoscopy procedure but agreeable to work with PT. Patient came to ED on 4/29 with 2 day hx of decreased PO intake, weakness, diarrhea. Found hypokalemia and enteritis and underwent colon biopsy this pm. At rest and with ambulation, patient belching and reports upset stomach but denies N&V. Had RW for support but patient did not feel it was necessary. PTA completely independent and self sufficient at home, lives with step dgt and her family. Today unsteady with narrowed base of support but no overt loss of balance just mild path deviations and slow. Reports this is not his usual baseline level, slightly below. Recommend PT for daily amb with RW as well as up with RN staff for amb in hallway with RW. Recommend up to chair 3x daily for meals when allowed po intake. Recommend home with or without HHPT follow up pending progress Current Level of Function Impacting Discharge (mobility/balance): CG A x 1, likely improved with RW for support Functional Outcome Measure: The patient scored 50/100 on the Barthel Index outcome measure. Other factors to consider for discharge: diagnosis, nutrition, home supervision available? Patient will benefit from skilled therapy intervention to address the above noted impairments. PLAN : 
Recommendations and Planned Interventions: gait training, therapeutic exercises, patient and family training/education, and therapeutic activities Frequency/Duration: Patient will be followed by physical therapy:  5 times a week to address goals. Recommendation for discharge: (in order for the patient to meet his/her long term goals) No skilled physical therapy/ follow up rehabilitation needs identified at this time. Or HHPT 2x weekly pending progress This discharge recommendation: 
Has not yet been discussed the attending provider and/or case management IF patient discharges home will need the following DME: rolling walker if patient agreeable? SUBJECTIVE:  
Patient stated I am self sufficient at home.  OBJECTIVE DATA SUMMARY:  
HISTORY:   
History reviewed. No pertinent past medical history. Past Surgical History:  
Procedure Laterality Date FLEXIBLE SIGMOIDOSCOPY N/A 5/1/2020 SIGMOIDOSCOPY FLEXIBLE performed by Holly George MD at [address] [place] [address] 10 Personal factors and/or comorbidities impacting plan of care:  
 
Home Situation Home Environment: Private residence # Steps to Enter: 1 Rails to Enter: No 
One/Two Story Residence: One story Living Alone: No 
 Support Systems: Child(tio), Family member(s)(step dgt and grandchildren) Patient Expects to be Discharged to[de-identified] Private residence Current DME Used/Available at Home: None Tub or Shower Type: Tub/Shower combination EXAMINATION/PRESENTATION/DECISION MAKING:  
Critical Behavior: 
Neurologic State: Alert, Appropriate for age Orientation Level: Oriented X4 Cognition: Appropriate decision making, Appropriate for age attention/concentration, Follows commands Safety/Judgement: Awareness of environment, Fall prevention, Insight into deficits, Decreased awareness of need for safety Hearing: Auditory Auditory Impairment: None Range Of Motion: 
AROM: Generally decreased, functional 
  
  
  
PROM: Generally decreased, functional 
  
  
  
Strength:   
Strength: Generally decreased, functional 
  
  
  
  
  
  
Tone & Sensation:  
Tone: Normal 
  
  
  
  
Sensation: Intact Coordination: 
Coordination: Generally decreased, functional 
 
Functional Mobility: 
Bed Mobility: 
Rolling: Modified independent Supine to Sit: Modified independent Sit to Supine: Modified independent Scooting: Modified independent Transfers: 
Sit to Stand: Modified independent Stand to Sit: Modified independent Stand Pivot Transfers: Stand-by assistance Bed to Chair: Supervision Balance:  
Sitting: Intact; Without support Standing: Intact Standing - Static: Good Standing - Dynamic : Fair;Unsupported Ambulation/Gait Training: 
Distance (ft): 100 Feet (ft) Assistive Device: Gait belt(recommended RW , patient declined) Ambulation - Level of Assistance: Contact guard assistance Gait Abnormalities: Antalgic; Path deviations Stairs - Level of Assistance: (NT) Functional Measure: 
Barthel Index: 50/100 The Barthel ADL Index: Guidelines 1. The index should be used as a record of what a patient does, not as a record of what a patient could do. 2. The main aim is to establish degree of independence from any help, physical or verbal, however minor and for whatever reason. 3. The need for supervision renders the patient not independent. 4. A patient's performance should be established using the best available evidence. Asking the patient, friends/relatives and nurses are the usual sources, but direct observation and common sense are also important. However direct testing is not needed. 5. Usually the patient's performance over the preceding 24-48 hours is important, but occasionally longer periods will be relevant. 6. Middle categories imply that the patient supplies over 50 per cent of the effort. 7. Use of aids to be independent is allowed. Bertina Aschoff., Barthel, D.W. (8900). Functional evaluation: the Barthel Index. [address] [facility] (14)2. RAMÓN Huynh, Gaviota Camacho.Sandra., [place], [address] [place] [address] (1999). Measuring the change indisability after inpatient rehabilitation; comparison of the responsiveness of the Barthel Index and Functional [place] Measure. Journal of Neurology, Neurosurgery, and Psychiatry, 66(4), 924-294. Edgar Gastelum, N.J.A, EVERETT Tee, & Jannet Phillips, M.A. (2004.) Assessment of post-stroke quality of life in cost-effectiveness studies: The usefulness of the Barthel Index and the EuroQoL-5D. [facility], 13, 664-32 Physical Therapy Evaluation Charge Determination History Examination Presentation Decision-Making MEDIUM  Complexity : 1-2 comorbidities / personal factors will impact the outcome/ POC  MEDIUM Complexity : 3 Standardized tests and measures addressing body structure, function, activity limitation and / or participation in recreation  LOW Complexity : Stable, uncomplicated  Other outcome measures Barthel Index  MEDIUM Based on the above components, the patient evaluation is determined to be of the following complexity level: LOW Pain Rating: Denies pain but states his stomach is \"upset\" Activity Tolerance:  
Fair, SpO2 stable on RA, and requires rest breaks Please refer to the flowsheet for vital signs taken during this treatment. After treatment patient left in no apparent distress:  
Supine in bed, Call bell within reach, and Side rails x 3 
 
COMMUNICATION/EDUCATION:  
The patients plan of care was discussed with: Registered nurse. Fall prevention education was provided and the patient/caregiver indicated understanding. and Patient understands intent and goals of therapy, but is neutral about his/her participation. Thank you for this referral. 
Emmy Wallace, PT, DPT Time Calculation: 9 mins

## 2020-05-01 NOTE — PROGRESS NOTES
Physical Therapy -  
Orders acknowledged. Missed patient by less than 15 minutes leaving unit for endoscopy for procedure. RN stated she will call PT on vocera when patient returns if he is alert and able to participate. Otherwise PT will follow tomorrow.  
Kassidy Arroyo, PT, DPT

## 2020-05-01 NOTE — ROUTINE PROCESS
[facility] 1940 
[id number] Situation: 
Verbal report received from: Ivonne Peña RN Procedure: Procedure(s): SIGMOIDOSCOPY FLEXIBLE 
COLON BIOPSY Background: 
 
Preoperative diagnosis: rectal mass Postoperative diagnosis: * No post-op diagnosis entered * :  Dr. Inder Mercado Assistant(s): Endoscopy Technician-1: Heidi Weir Endoscopy RN-1: Robby Pool RN Specimens:  
ID Type Source Tests Collected by Time Destination 1 : rectal biopsy Preservative Rectum  Vishal Celis MD 5/1/2020 1550 Pathology H. Pylori  no Assessment: 
Intra-procedure medications Anesthesia gave intra-procedure sedation and medications, see anesthesia flow sheet yes Intravenous fluids: NS@ [facility] Vital signs stable Abdominal assessment: round and soft Recommendation: 
Discharge patient per MD order. Return to floor Permission to share finding with family or friend yes

## 2020-05-01 NOTE — PROGRESS NOTES
5/1/2020   CARE MANAGEMENT NOTE:  Pt transferred from ICU to the 5th floor. EMR reviewed and handoff received from previous . Pt was admitted with enteritis. Reportedly, pt resides with his dtr, Maria G Mcgrath ([phone]). RUR 16% Transition Plan of Care: 1. PT eval pending and await recs; OT recs therapy 3 hrs per day 2. CM will discuss rehab options with pt/dtr 3. Outpt CM will continue to follow pt for definitive dispo D. Sofy May

## 2020-05-01 NOTE — PROGRESS NOTES
Automatic dose adjustment per [facility] P&T protocol for Metronidazole (Flagyl) for this 67 y.o. for indication of: intra-abdominal 
 
Current regimen: Metronidazole 500mg IV every 8 hrs Recommended regimen: Metronidazole 500mg IV every 12 hrs Intervention: 1. Pharmacy will automatically change the dose of metronidazole to 500 mg every 12 hours for all indications except for the following:  
a. C. difficile infection  
b. CNS infections  
c. Non-anaerobic infections (giardiasis, trichomonas, H pylori, etc) Thank you, 
Farhan Frey.  Delicia Talbot ,  Ana Schaferare

## 2020-05-01 NOTE — ANESTHESIA PREPROCEDURE EVALUATION
Anesthetic History No history of anesthetic complications Review of Systems / Medical History Patient summary reviewed and pertinent labs reviewed Pulmonary Within defined limits Neuro/Psych Dementia Cardiovascular Hypertension Comments: Mild trop elevation to 0.1 Cardiology eval and echo performed showed no signs of ischemia Denies Chest pain or SOB  
GI/Hepatic/Renal 
  
 
 
Renal disease: ARF Comments: Admitted with N/V/D Bowel distension on exam and KUB but no mention of obstruction. Pt says abd is always distended. Denies any nausea or vomiting today Endo/Other Within defined limits Other Findings Physical Exam 
 
Airway Mallampati: II 
 
Neck ROM: normal range of motion Mouth opening: Normal 
 
 Cardiovascular Rhythm: regular Rate: normal 
 
 
 
 Dental 
 
Dentition: Edentulous Pulmonary Breath sounds clear to auscultation Abdominal 
 
Distended Comments: Abdomen chronically distended per patient Other Findings Anesthetic Plan ASA: 3 Anesthesia type: MAC Induction: Intravenous Anesthetic plan and risks discussed with: Patient and Healthcare power of  Spoke with POA daughter on phone. Risks discussed and ok to proceed

## 2020-05-01 NOTE — PROGRESS NOTES
Bedside and Verbal shift change report given to Sophia Pulido RN (oncoming nurse) by Denise Graham RN (offgoing nurse). Report included the following information SBAR, Kardex, MAR, Accordion and Recent Results.

## 2020-05-01 NOTE — PROGRESS NOTES
04/29/20 ECHO ADULT COMPLETE 04/30/2020 4/30/2020 Narrative · Normal cavity size and systolic function (ejection fraction normal). Moderate concentric hypertrophy. Estimated left ventricular ejection  
fraction is 65 - 70%. No regional wall motion abnormality noted. Mild  
(grade 1) left ventricular diastolic dysfunction. · Pulmonary arterial systolic pressure is 35 mmHg. Signed by: Dorothy Braden MD  
 
Echo shows normal LVEF No WMA Plans for endoscopy procedures today Appointment with Dr Jose Larose set up to discuss possible stress test in the future

## 2020-05-01 NOTE — PROGRESS NOTES
Problem: Self Care Deficits Care Plan (Adult) Goal: *Acute Goals and Plan of Care (Insert Text) Description:  
FUNCTIONAL STATUS PRIOR TO ADMISSION: Pt poor historian; however, reports living with daughter in law, residing on first floor of two story house, with no MARILYN, stating he was Independent with ADLs without use of DME, standing to shower in walk-in shower. Reports he enjoys listening to all music but the \"hillbilly\" kind. HOME SUPPORT: The patient lived with daughter in law who provides PRN ADL/IADL assist. 
 
Occupational Therapy Goals Initiated 4/30/2020.   
1.  Patient will perform standing grooming with supervision within 7 day(s)- MET 5/1/20 and upgrade to mod I. 
2.  Patient will perform lower body dressing with supervision within 7 day(s)- MET 5/1/20 and upgrade to mod I. 
3.  Patient will perform bathing with supervision within 7 day(s)- MET 5/1/20 and upgrade to mod I. 
4.  Patient will perform toilet transfers with supervision within 7 day(s)- MET 5/1/20 and upgrade to mod I. 
5.  Patient will perform all aspects of toileting with supervision within 7 day(s)- MET 5/1/20 and upgrade to mod I. 
6.  Patient will participate in upper extremity therapeutic exercise/activities with supervision for 10 minutes within 7 day(s). 7.  Patient will utilize energy conservation techniques during functional activities with Min verbal cues within 7 day(s). Outcome: Progressing Towards Goal 
  
OCCUPATIONAL THERAPY TREATMENT Patient: Chandler Silva (52 y.o. male) Date: 5/1/2020 Diagnosis: Enteritis [K52.9] <principal problem not specified> Procedure(s) (LRB): 
SIGMOIDOSCOPY FLEXIBLE (N/A) Precautions: Fall, Contact Chart, occupational therapy assessment, plan of care, and goals were reviewed. ASSESSMENT Patient continues with skilled OT services and is progressing towards goals.   He was amb in room with supervision upon arrival and during session. He demonstrated fair safety awareness and no LOB. Pt scheduled for endo later today to assess mass. Recommend HH therapy at discharge. Current Level of Function Impacting Discharge (ADLs): supervision and set-up for LE ADLs, toileting and functional mobility Other factors to consider for discharge: see above PLAN : 
Patient continues to benefit from skilled intervention to address the above impairments. Continue treatment per established plan of care. to address goals. Recommend with staff: Recommend with nursing patient to complete as able in order to maintain strength, endurance and independence: ADLs with supervision/setup and OOB to chair 3x/day and mobilizing to the bathroom for toileting with supervision assist. Thank you for your assistance. Recommend next OT session: standing endurance and balance, light IADLs Recommendation for discharge: (in order for the patient to meet his/her long term goals) To be determined: pending medical POC after endo This discharge recommendation: 
Has not yet been discussed the attending provider and/or case management IF patient discharges home will need the following DME: TBD SUBJECTIVE:  
Patient stated I am so hungry.  OBJECTIVE DATA SUMMARY:  
Cognitive/Behavioral Status: 
Neurologic State: Alert; Appropriate for age Orientation Level: Oriented X4 Cognition: Appropriate decision making; Appropriate for age attention/concentration; Follows commands Perception: Appears intact Perseveration: No perseveration noted Safety/Judgement: Awareness of environment; Fall prevention; Insight into deficits; Decreased awareness of need for safety Functional Mobility and Transfers for ADLs: 
Bed Mobility: 
Supine to Sit: Modified independent Sit to Supine: Modified independent Scooting: Modified independent Transfers: 
Sit to Stand: Supervision Functional Transfers Bathroom Mobility: Supervision/set up Toilet Transfer : Supervision Cues: Visual cues provided Bed to Chair: Supervision Balance: 
Sitting: Intact Standing: Intact Standing - Static: Good Standing - Dynamic : Fair ADL Intervention: 
Grooming Grooming Assistance: Supervision Position Performed: Standing Washing Hands: Supervision Cues: Verbal cues provided Lower Body Dressing Assistance Dressing Assistance: Supervision Underpants: Supervision Socks: Supervision Position Performed: Standing;Seated edge of bed Cues: Don;Doff;Verbal cues provided Toileting Toileting Assistance: Supervision Bladder Hygiene: Modified independent Bowel Hygiene: Modified indpendent Clothing Management: Supervision Cues: Verbal cues provided Cognitive Retraining Safety/Judgement: Awareness of environment; Fall prevention; Insight into deficits; Decreased awareness of need for safety Patient instructed and indicated understanding energy conservation techniques to increase independence and safety during all ADLs for end goal of returning back home. Provided instruction body is like a jar of marbles, marbles represent energy, at end of day need as many marbles as possible to obtain a good night sleep, REM sleep is when the body repairs itself. This ensures a full jar of marbles, full of energy when wake up to start a new day. Use energy conservation techniques during ADLs so can increase participation in life activities patient prefers, to ensure more frequent good days. If having a bad day, evaluate tasks completed day before and re-plan how to save energy to complete same tasks, for example if going grocery shopping do not complete full bathing/dressing/grooming. Visual handout provided. Patient indicated understanding by stating tasks already completing to save energy ie sitting to don all clothing. Pain: 
No c/o pain Activity Tolerance:  
Fair and requires rest breaks Please refer to the flowsheet for vital signs taken during this treatment. After treatment patient left in no apparent distress:  
Supine in bed and Call bell within reach COMMUNICATION/COLLABORATION:  
The patients plan of care was discussed with: Registered nurse. Tereza Rock OT Time Calculation: 24 mins

## 2020-05-01 NOTE — PROGRESS NOTES
RN attempted to administer tap water enema per order. Patient unable to retain any of the enema long enough to take effect. RN notified Nakul Lanier MD (GI).

## 2020-05-01 NOTE — PROCEDURES
[address] Brenda Jaime. [place], [address] [facility] 
[phone] May 1, 2020 Sigmoidoscopy Procedure Note Apolonia Hoang :  1940 Russ Medical Record Number: [id number] Indications:     Abnormal rectal exam/CT  
PCP:  Monika Welsh MD 
Anesthesia/Sedation: Conscious Sedation/Moderate Sedation/GETA, see notes Endoscopist:  Dr. Leora Sanchez Complications:  None Estimated Blood Loss:  None Permit: The indications, risks, benefits and alternatives were reviewed with the patient or their decision maker who was provided an opportunity to ask questions and all questions were answered. The specific risks of colonoscopy with conscious sedation were reviewed, including but not limited to anesthetic complication, bleeding, adverse drug reaction, missed lesion, infection, IV site reactions, and intestinal perforation which would lead to the need for surgical repair. Alternatives to colonoscopy including radiographic imaging, observation without testing, or laboratory testing were reviewed including the limitations of those alternatives. After considering the options and having all their questions answered, the patient or their decision maker provided both verbal and written consent to proceed. Procedure in Detail: After obtaining informed consent, positioning of the patient in the left lateral decubitus position, and conduction of a pre-procedure pause or \"time out\" the endoscope was introduced into the anus and advanced to the sigmoid colon. The quality of the colonic preparation was inadequate. A careful inspection was made as the colonoscope was withdrawn, findings and interventions are described below. Findings: This was an enema prep as he would not tolerate PO prep due to his gastroenteritis. The rectum is cleansed but there is generous stool in the sigmoid. There is a malignant appearing fungating friable circumferental mass from the dentate up to 10cm. We were able to just get the scope to navigate with mild resistance, I estimate the lumen to be 11-12mm. Above 10cm there's too much stool to safely navigate. Cold forceps biopsies obtained for histology. Specimens:  
 See above Complications:  
None; patient tolerated the procedure well. Impression: The endoscopic impression is that of rectal carcinoma. Biopsies pending. He and daughter report previous colonoscopy and GI evaluation having been done years ago at [facility] but I have no access to those records. Recommendations: - Await pathology. - CT has been done, get CEA, get colorectal surgery consult. Thank you for entrusting me with this patient's care. Please do not hesitate to contact me with any questions or if I can be of assistance with any of your other patients' GI needs. Signed By: Gege Arnold MD 
                      May 1, 2020 Surgical assistant none. Implants none unless specified.

## 2020-05-01 NOTE — PROGRESS NOTES
Trey Varghese [address] Angy Chau YOB: 1940 Assessment & Plan: VERONIQUE due to IVVD, improving Baseline Cr unknown, may have CKD Hypokalemia Diarrhea/enteritis Rec: 
Continue IVF Replete K Avoid nephrotoxins No need for RRT Subjective:  
CC: f/u VERONIQUE 
HPI: Renal function improving ROS: no sob/n/v Current Facility-Administered Medications Medication Dose Route Frequency  potassium chloride 10 mEq in 100 ml IVPB  10 mEq IntraVENous Q1H  
 dextrose 5% - 0.45% NaCl with KCl 40 mEq/L infusion   IntraVENous CONTINUOUS  
 [START ON 2020] heparin (porcine) injection 5,000 Units  5,000 Units SubCUTAneous Q8H  
 benzocaine (HURRICANE) 20 % spray   Mucous Membrane PRN  
 sodium chloride (NS) flush 5-40 mL  5-40 mL IntraVENous Q8H  
 sodium chloride (NS) flush 5-40 mL  5-40 mL IntraVENous PRN  
 acetaminophen (TYLENOL) tablet 650 mg  650 mg Oral Q4H PRN  
 morphine injection 1 mg  1 mg IntraVENous Q4H PRN  
 ondansetron (ZOFRAN) injection 4 mg  4 mg IntraVENous Q4H PRN  
 metroNIDAZOLE (FLAGYL) IVPB premix 500 mg  500 mg IntraVENous Q8H  
 amLODIPine (NORVASC) tablet 10 mg  10 mg Oral DAILY  cefepime (MAXIPIME) 2 g in 0.9% sodium chloride (MBP/ADV) 100 mL  2 g IntraVENous Q12H  
 labetaloL (NORMODYNE;TRANDATE) 20 mg/4 mL (5 mg/mL) injection 10 mg  10 mg IntraVENous Q4H PRN  
 aspirin delayed-release tablet 81 mg  81 mg Oral DAILY  pantoprazole (PROTONIX) 40 mg in 0.9% sodium chloride 10 mL injection  40 mg IntraVENous DAILY  sucralfate (CARAFATE) tablet 1 g  1 g Oral AC&HS  ondansetron (ZOFRAN) injection 4 mg  4 mg IntraVENous Q1H PRN Objective:  
 
Vitals: 
Blood pressure (!) 159/104, pulse 81, temperature 97.5 °F (36.4 °C), resp. rate 18, height 5' 8\" (1.727 m), weight 86.1 kg (189 lb 13.1 oz), SpO2 97 %. Temp (24hrs), Av.3 °F (36.8 °C), Min:97.5 °F (36.4 °C), Max:98.8 °F (37.1 °C) Intake and Output: 
No intake/output data recorded. 04/29 1901 - 05/01 0700 In: 26 [P.O.:600; I.V.:2000] Out: 50 [Urine:50] Physical Exam:              
GENERAL ASSESSMENT: NAD 
CHEST: CTA HEART: S1S2 ABDOMEN: protuberant NT 
EXTREMITY: no EDEMA 
 
   
ECG/rhythm: 
 
Data Review No results for input(s): TNIPOC in the last 72 hours. No lab exists for component: ITNL Recent Labs 05/01/20 
0400 04/30/20 1118 04/30/20 
0518 TROIQ 0.10* 0.11* 0.10* Recent Labs 05/01/20 
0400 04/30/20 
1118 04/29/20 
2201  145 142  
K 3.0* 3.3* 3.6 * 115* 112* CO2 19* 19* 20* BUN 34* 33* 34* CREA 1.72* 1.83* 2.24* GLU 90 109* 133* PHOS 2.8 3.0  --   
MG 1.9  --  2.0  
CA 8.0* 7.8* 8.1* ALB 2.7* 2.9* 2.9* WBC 15.0*  --  17.0*  
HGB 14.0  --  15.5 HCT 42.6  --  46.9   --  217 No results for input(s): INR, PTP, APTT, INREXT in the last 72 hours. Needs: urine analysis, urine sodium, protein and creatinine Lab Results Component Value Date/Time Sodium,urine random 22 04/30/2020 11:32 AM  
 Creatinine, urine 280.00 04/30/2020 11:31 AM  
 
 
 
 
: Siri Dudley MD 
5/1/2020 [facility]: 
[email or web address] [facility] office: 
[address] [place] [address], [address] [place], [address] [place] [address] Phone: [phone] Fax :     [phone] [facility] office: 
[address] [facility], [address] Phone - [phone] Fax - [phone]

## 2020-05-02 NOTE — PROGRESS NOTES
Problem: Mobility Impaired (Adult and Pediatric) Goal: *Acute Goals and Plan of Care (Insert Text) Description: FUNCTIONAL STATUS PRIOR TO ADMISSION: Patient was modified independent for functional mobility and ADLs per patient, still drives. HOME SUPPORT PRIOR TO ADMISSION: The patient lived with family members but did not require assist. 
 
Physical Therapy Goals Initiated 5/1/2020 1. Patient will move from supine to sit and sit to supine , scoot up and down and roll side to side in bed with independence within 7 day(s). 2.  Patient will transfer from bed to chair and chair to bed with independence using the least restrictive device within 7 day(s). 3.  Patient will perform sit <> stand with independence within 7 day(s). 4.  Patient will ambulate with independence for 150 feet with the least restrictive device within 7 day(s). Note: PHYSICAL THERAPY TREATMENT Patient: Dasia Harris (14 y.o. male) Date: 5/2/2020 Diagnosis: Enteritis [K52.9] <principal problem not specified> Procedure(s) (LRB): 
SIGMOIDOSCOPY FLEXIBLE (N/A) COLON BIOPSY (N/A) 1 Day Post-Op Precautions: Contact, Fall Chart, physical therapy assessment, plan of care and goals were reviewed. ASSESSMENT Patient continues with skilled PT services. Pt CGA supine to sit to stand. Pt ambulated 120ft with no device CGA. Pt with increased lateral sway and tends to reach for hallway rail at times. Pt would benefit from RW but declined. Pt CGA back to bed. Pt progressing slowly. Continue goals. Current Level of Function Impacting Discharge (mobility/balance): Pt is CGA and cues for safety with ambulation. PLAN : 
Patient continues to benefit from skilled intervention to address the above impairments. Continue treatment per established plan of care. to address goals. Recommendation for discharge: (in order for the patient to meet his/her long term goals) Physical therapy at least 2 days/week in the home This discharge recommendation: 
Has been made in collaboration with the attending provider and/or case management IF patient discharges home will need the following DME: May need RW pending progress SUBJECTIVE:  
 
 
OBJECTIVE DATA SUMMARY:  
Critical Behavior: 
Neurologic State: Alert Orientation Level: Oriented to person, Oriented to place, Disoriented to situation, Disoriented to time Cognition: Follows commands Safety/Judgement: Awareness of environment, Fall prevention, Insight into deficits, Decreased awareness of need for safety Functional Mobility Training: 
Bed Mobility: 
  
Supine to Sit: Contact guard assistance Sit to Supine: Contact guard assistance Transfers: 
Sit to Stand: Contact guard assistance Bed to Chair: Contact guard assistance Balance: 
Standing - Static: Fair Ambulation/Gait Training: 
Distance (ft): 120 Feet (ft) Assistive Device: Gait belt Ambulation - Level of Assistance: Contact guard assistance Gait Abnormalities: Decreased step clearance;Trunk sway increased Activity Tolerance:  
Fair Please refer to the flowsheet for vital signs taken during this treatment. After treatment patient left in no apparent distress:  
Supine in bed COMMUNICATION/COLLABORATION:  
The patients plan of care was discussed with: Physical therapist.  
 
Shahida Taylor PTA Time Calculation: 24 mins

## 2020-05-02 NOTE — CONSULTS
[address] [facility]  Medical Oncology at [address] [place] [address] [phone] Hematology / Oncology Consult Reason for Visit:  
Sarai Parker is a 67 y.o. male who is seen in consultation at the request of Dr. Jody Montaño for evaluation of probable rectal cancer. History of Present Illness:  
Sarai Parker is a 67 y.o. male with mild dementia admitted with n/v, found to have rectal mass. He was admitted 4/30/20 after 10 days of n/v/d refractory to trial of antiemetic at home. Imaging showed small and large bowel distention as well as possible enteritis. Admission labs notable for WBC 17, Cr 2.24. Blood cx negative, but urine culture growing apparent alpha streptococcus. Pt currently on Cefepime and Flagyl. Pt evaluated by Dr. Garima Lester in GI, found to have firm nodular mass in rectum. Per patient's step daughter, patient has been weak for the past 2 weeks. However, prior to that he has been active with dressing himself, taking walks in the neighborhood. Pt did have prior colonoscopy approx 10 yrs ago at [facility] 57 thinks her mother might have mentioned that pt had colon cancer in the past, but she is unsure. In further speaking with step-daughter, she states she is patient's POA. She states his short-term memory has been quite poor and often cannot remember whether he ate that day or not. She does not feel that he would want aggressive treatment measures. History reviewed. No pertinent past medical history. Past Surgical History:  
Procedure Laterality Date 2021 Dee Becerra N/A 5/1/2020 SIGMOIDOSCOPY FLEXIBLE performed by Anatoly Parikh MD at [address] [place] [address] Social History Tobacco Use  Smoking status: Former Smoker  Smokeless tobacco: Never Used Substance Use Topics  Alcohol use: Not on file History reviewed. No pertinent family history. Current Facility-Administered Medications Medication Dose Route Frequency  labetaloL (NORMODYNE) tablet 100 mg  100 mg Oral Q12H  
 dextrose 5% - 0.45% NaCl with KCl 40 mEq/L infusion   IntraVENous CONTINUOUS  
 heparin (porcine) injection 5,000 Units  5,000 Units SubCUTAneous Q8H  
 metroNIDAZOLE (FLAGYL) IVPB premix 500 mg  500 mg IntraVENous Q12H  
 benzocaine (HURRICANE) 20 % spray   Mucous Membrane PRN  
 sodium chloride (NS) flush 5-40 mL  5-40 mL IntraVENous Q8H  
 sodium chloride (NS) flush 5-40 mL  5-40 mL IntraVENous PRN  
 acetaminophen (TYLENOL) tablet 650 mg  650 mg Oral Q4H PRN  
 morphine injection 1 mg  1 mg IntraVENous Q4H PRN  
 ondansetron (ZOFRAN) injection 4 mg  4 mg IntraVENous Q4H PRN  
 amLODIPine (NORVASC) tablet 10 mg  10 mg Oral DAILY  cefepime (MAXIPIME) 2 g in 0.9% sodium chloride (MBP/ADV) 100 mL  2 g IntraVENous Q12H  
 labetaloL (NORMODYNE;TRANDATE) 20 mg/4 mL (5 mg/mL) injection 10 mg  10 mg IntraVENous Q4H PRN  
 aspirin delayed-release tablet 81 mg  81 mg Oral DAILY  pantoprazole (PROTONIX) 40 mg in 0.9% sodium chloride 10 mL injection  40 mg IntraVENous DAILY  sucralfate (CARAFATE) tablet 1 g  1 g Oral AC&HS  ondansetron (ZOFRAN) injection 4 mg  4 mg IntraVENous Q1H PRN No Known Allergies Review of Systems: A complete review of systems was obtained, negative except as described above. Physical Exam:  
 
Visit Vitals /87 (BP 1 Location: Left arm, BP Patient Position: At rest) Pulse 67 Temp 98.1 °F (36.7 °C) Resp 20 Ht 5' 8\" (1.727 m) Wt 189 lb 13.1 oz (86.1 kg) SpO2 99% BMI 28.86 kg/m² ECOG PS: 2 General: No distress Eyes: PERRLA, anicteric sclerae HENT: Atraumatic with normal appearance of ears and nose; OP clear Neck: Supple; no thyromegaly Lymphatic: No cervical, supraclavicular, or inguinal adenopathy Respiratory: CTAB, normal respiratory effort CV: Normal rate, regular rhythm, no murmurs, no peripheral edema GI: Soft but mildly distended, nontender, no hepatomegaly, no splenomegaly MS: Normal gait and station. Digits without clubbing or cyanosis. Skin: No rashes, ecchymoses, or petechiae. Normal temperature, turgor, and texture. Neuro/Psych: Alert, oriented to person, place, year but not month. Appropriate affect, questionable memory,  judgment/insight Results:  
 
Lab Results Component Value Date/Time WBC 13.5 (H) 2020 03:59 AM  
 HGB 13.6 2020 03:59 AM  
 HCT 41.7 2020 03:59 AM  
 PLATELET 070  03:59 AM  
 MCV 91.4 2020 03:59 AM  
 ABS. NEUTROPHILS 10.9 (H) 2020 03:59 AM  
 
Lab Results Component Value Date/Time Sodium 143 2020 03:59 AM  
 Potassium 3.3 (L) 2020 03:59 AM  
 Chloride 117 (H) 2020 03:59 AM  
 CO2 18 (L) 2020 03:59 AM  
 Glucose 101 (H) 2020 03:59 AM  
 BUN 31 (H) 2020 03:59 AM  
 Creatinine 1.46 (H) 2020 03:59 AM  
 GFR est AA 57 (L) 2020 03:59 AM  
 GFR est non-AA 47 (L) 2020 03:59 AM  
 Calcium 7.7 (L) 2020 03:59 AM  
 
Lab Results Component Value Date/Time Bilirubin, total 0.8 2020 03:59 AM  
 ALT (SGPT) 12 2020 03:59 AM  
 AST (SGOT) 14 (L) 2020 03:59 AM  
 Alk. phosphatase 56 2020 03:59 AM  
 Protein, total 5.9 (L) 2020 03:59 AM  
 Albumin 2.6 (L) 2020 03:59 AM  
 Globulin 3.3 2020 03:59 AM  
 
 
Imagin/29/20 KUB: IMPRESSION: Gaseous small and large bowel distention. 30 abd/pelvis CT: IMPRESSION: 
Extensive small and large bowel distention. Fluid-filled loops of small and 
large bowel. The absence of IV contrast limits evaluation, consider severe 
enteritis. Distal circumferential rectal wall thickening may be related to 
infectious process, neoplastic etiology is not excluded. Cholelithiasis. Circumferential bladder wall thickening most likely related to chronic outlet obstruction. Neoplastic etiologies deemed less likely. Additional incidental findings are as described above. 5/2/20 KUB: IMPRESSION: Continued large and small bowel distention. Procedures: 
 
Flex sig 5/1/20: Findings: This was an enema prep as he would not tolerate PO prep due to his gastroenteritis. The rectum is cleansed but there is generous stool in the sigmoid. 
Jonathan Luromelia is a malignant appearing fungating friable circumferental mass from the dentate up to 10cm. We were able to just get the scope to navigate with mild resistance, I estimate the lumen to be 11-12mm. Above 10cm there's too much stool to safely navigate. Cold forceps biopsies obtained for histology. Impression: The endoscopic impression is that of rectal carcinoma. Biopsies pending. He and daughter report previous colonoscopy and GI evaluation having been done years ago at [facility] but I have no access to those records.  
  
Recommendations: - Await pathology. - CT has been done, get CEA, get colorectal surgery consult. Assessment and Recommendations:  
Sean Slade is a 67 y.o. male 1. Rectal mass: Stage unclear at this time and need to rule out distant metastases with imaging using IV contrast when feasible. Discussed with patient's step-daughter that standard of care if no distant metastatic disease would be surgery or neoadjuvant chemoradiation or definitive chemoradiation. She feels that due to patient's dementia, it would be very difficult to care for him and she does not feel he would want aggressive measures of treatment. She is more interested in palliative options. -- f/u biopsy results -- Chest CT with IV contrast to complete staging 
-- Will also need abd/pelvis CT with IV contrast OR abdomen/pelvis MRI once Cr improved - likely tomorrow. -- f/u CEA 
-- Colorectal surgery consulted 
-- Consult palliative care on Monday to assist with goals of care discussion 2. VERONIQUE: 2/2 n/v and improving with IVF hydration. 3. Alpha streptococcus UTI: On Cefepime, Flagyl 4. Dementia: Poor short term memory Signed By: Josesito Santos MD   
 May 2, 2020

## 2020-05-02 NOTE — PROGRESS NOTES
Problem: Risk for Spread of Infection Goal: Prevent transmission of infectious organism to others Description: Prevent the transmission of infectious organisms to other patients, staff members, and visitors. Outcome: Progressing Towards Goal 
  
Problem: Falls - Risk of 
Goal: *Absence of Falls Description: Document Segundo Castelan Fall Risk and appropriate interventions in the flowsheet. Outcome: Progressing Towards Goal 
Note: Fall Risk Interventions: 
Mobility Interventions: Bed/chair exit alarm, Communicate number of staff needed for ambulation/transfer, OT consult for ADLs, Patient to call before getting OOB, PT Consult for mobility concerns, PT Consult for assist device competence, Utilize walker, cane, or other assistive device, Utilize gait belt for transfers/ambulation Mentation Interventions: Adequate sleep, hydration, pain control, Bed/chair exit alarm, Door open when patient unattended, Evaluate medications/consider consulting pharmacy, Gait belt with transfers/ambulation, Increase mobility, More frequent rounding, Reorient patient, Toileting rounds Medication Interventions: Bed/chair exit alarm, Evaluate medications/consider consulting pharmacy, Patient to call before getting OOB, Teach patient to arise slowly, Utilize gait belt for transfers/ambulation Elimination Interventions: Bed/chair exit alarm, Call light in reach, Patient to call for help with toileting needs, Toileting schedule/hourly rounds, Urinal in reach Problem: Pressure Injury - Risk of 
Goal: *Prevention of pressure injury Description: Document Baltazar Scale and appropriate interventions in the flowsheet.  
Outcome: Progressing Towards Goal 
Note: Pressure Injury Interventions: 
Sensory Interventions: Assess changes in LOC, Assess need for specialty bed, Check visual cues for pain, Discuss PT/OT consult with provider, Keep linens dry and wrinkle-free, Maintain/enhance activity level, Monitor skin under medical devices, Pressure redistribution bed/mattress (bed type), Turn and reposition approx. every two hours (pillows and wedges if needed) Moisture Interventions: Absorbent underpads, Maintain skin hydration (lotion/cream) Activity Interventions: Increase time out of bed, Pressure redistribution bed/mattress(bed type), PT/OT evaluation Mobility Interventions: HOB 30 degrees or less, Pressure redistribution bed/mattress (bed type), PT/OT evaluation, Turn and reposition approx. every two hours(pillow and wedges) Nutrition Interventions: Document food/fluid/supplement intake, Offer support with meals,snacks and hydration Friction and Shear Interventions: HOB 30 degrees or less, Transferring/repositioning devices

## 2020-05-02 NOTE — ROUTINE PROCESS
Bedside shift change report given to Cassandra Severance (oncoming nurse) by Miesha Coronado (offgoing nurse). Report included the following information SBAR, Kardex, Intake/Output, MAR, Accordion, Recent Results and Med Rec Status.

## 2020-05-02 NOTE — ROUTINE PROCESS
1023 - /114, norvasc given, will recheck. 1110 - Pt /106. Unable to give PRN labetalol that is ordered because BP does not meet the parameters. Notified Dr. Shonda Mcginnis. One time dose of labetalol ordered. Will  
give and continue to monitor. 1147 - labetalol given. 1240 - BP now 131/87

## 2020-05-02 NOTE — PROGRESS NOTES
Trey Kincaid robb [place] 80 
[address] [facility], [address] [place] [address] 
[phone] Medical Progress Note NAME: Tim Mendez :  1940 MRM:  [id number] Date/Time: 2020 Assessment / Plan:  
 
  Enteritis: CT showed extensive small and large bowel distention w/ fluid-filled loops of small and large bowel, possible severe enteritis. Distal circumferential rectal wall thickening concerning for cancer. Underwent flex sig today, with findings concerning for rectal carcinoma. Biopsies pending. CRS and oncology consult. Send CEA. Continue cefepime and Flagyl for now. Keep NPO due to abdominal distension and concerns with development of obstruction. KUB in AM 
 
  Renal insufficiency: VERONIQUE vs CKD3-4. T showed circumferential bladder wall thickening most likely related to chronic outlet obstruction. UA is consistent with UTI. On cefepime as above. Follow urine culture Dementia: answered questions appropriately. Supportive care. At risk for delirium Elevated troponin: minimally elevated, remaining flat, in the setting of renal insufficiency. Appreciate cardiology evaluation. TTE showed preserved LVEF w/o RWMA. Adding ASA Leukocytosis: likely from infectious vs malignant process. Follow CBC  
 
  HTN (hypertension): increased Norvasc. Continue IV labetalol PRN Total time spent:  25 minutes Time spent in the care of this patient including reviewing records, discussing with nursing and/or other providers on the treatment team, obtaining history and examining the patient, and discussing treatment plans. Care Plan discussed with: Patient, Nursing Staff and >50% of time spent in counseling and coordination of care Discussed:  Care Plan and D/C Planning Prophylaxis:  Hep SQ Disposition:   PT, OT, RN Subjective: Chief Complaint:  Follow up enteritis Chart/notes/labs/studies reviewed, patient examined. Nauseated and vomited per nursing report this morning. Abdominal bloating. No fevers. No CP or SOB Objective:  
 
 
Vitals:  
 
  
Last 24hrs VS reviewed since prior progress note. Most recent are: 
 
Visit Vitals BP (!) 167/106 (BP 1 Location: Right arm, BP Patient Position: At rest) Pulse 63 Temp 97.5 °F (36.4 °C) Resp 20 Ht 5' 8\" (1.727 m) Wt 86.1 kg (189 lb 13.1 oz) SpO2 96% BMI 28.86 kg/m² SpO2 Readings from Last 6 Encounters:  
05/01/20 96% Intake/Output Summary (Last 24 hours) at 5/1/2020 2153 Last data filed at 5/1/2020 1555 Gross per 24 hour Intake 350 ml Output  Net 350 ml Exam:  
 
Physical Exam: 
 
Gen:  Well-developed, well-nourished, in no acute distress HEENT:  Sclerae nonicteric, hearing intact to voice, mucous membranes moist 
Neck:  Supple, without masses. Resp:  No accessory muscle use, CTAB without wheezes, rales, or rhonchi 
Card: RRR, without m/r/g. No LE edema. Abd:  +bowel sounds, soft, mild TTP. No rebound, distended . Neuro: Face symmetric, tongue midline, speech fluent, follows commands appropriately Psych:  Alert, oriented to self and hospital. Limited insight Medications Reviewed: (see below) Lab Data Reviewed: (see below) 
 
______________________________________________________________________ Medications:  
 
Current Facility-Administered Medications Medication Dose Route Frequency  dextrose 5% - 0.45% NaCl with KCl 40 mEq/L infusion   IntraVENous CONTINUOUS  
 [START ON 5/2/2020] heparin (porcine) injection 5,000 Units  5,000 Units SubCUTAneous Q8H  
 metroNIDAZOLE (FLAGYL) IVPB premix 500 mg  500 mg IntraVENous Q12H  
 benzocaine (HURRICANE) 20 % spray   Mucous Membrane PRN  
 sodium chloride (NS) flush 5-40 mL  5-40 mL IntraVENous Q8H  
 sodium chloride (NS) flush 5-40 mL  5-40 mL IntraVENous PRN  
 acetaminophen (TYLENOL) tablet 650 mg  650 mg Oral Q4H PRN  
  morphine injection 1 mg  1 mg IntraVENous Q4H PRN  
 ondansetron (ZOFRAN) injection 4 mg  4 mg IntraVENous Q4H PRN  
 amLODIPine (NORVASC) tablet 10 mg  10 mg Oral DAILY  cefepime (MAXIPIME) 2 g in 0.9% sodium chloride (MBP/ADV) 100 mL  2 g IntraVENous Q12H  
 labetaloL (NORMODYNE;TRANDATE) 20 mg/4 mL (5 mg/mL) injection 10 mg  10 mg IntraVENous Q4H PRN  
 aspirin delayed-release tablet 81 mg  81 mg Oral DAILY  pantoprazole (PROTONIX) 40 mg in 0.9% sodium chloride 10 mL injection  40 mg IntraVENous DAILY  sucralfate (CARAFATE) tablet 1 g  1 g Oral AC&HS  ondansetron (ZOFRAN) injection 4 mg  4 mg IntraVENous Q1H PRN Lab Review:  
 
Recent Labs 05/01/20 
0400 04/29/20 
2201 WBC 15.0* 17.0*  
HGB 14.0 15.5 HCT 42.6 46.9  217 Recent Labs 05/01/20 
0400 04/30/20 
1118 04/29/20 
2201  145 142  
K 3.0* 3.3* 3.6 * 115* 112* CO2 19* 19* 20* GLU 90 109* 133* BUN 34* 33* 34* CREA 1.72* 1.83* 2.24* CA 8.0* 7.8* 8.1*  
MG 1.9  --  2.0 PHOS 2.8 3.0  --   
ALB 2.7* 2.9* 2.9*  
SGOT 11*  --  17 ALT 13  --  16 No components found for: [place] No results for input(s): PH, PCO2, PO2, HCO3, FIO2 in the last 72 hours. No results for input(s): INR, INREXT, INREXT in the last 72 hours. No results found for: SDES Lab Results Component Value Date/Time Culture result: APPARENT ALPHA STREPTOCOCCUS (A) 04/30/2020 11:32 AM  
 Culture result: CULTURE IN [address] UPDATES TO FOLLOW 04/30/2020 11:32 AM  
 Culture result: NO GROWTH AFTER 23 HOURS 04/30/2020 01:40 AM  
 
        
___________________________________________________ Attending Physician: Ashleigh Light MD

## 2020-05-02 NOTE — PROGRESS NOTES
Roula Ng. Hilton Ramirez, [address] [facility] 
[phone] office 
[phone] [facility] Gastroenterology Progress Note May 2, 2020 Admit Date: 4/29/2020 Narrative Assessment and Plan · Rectal mass - biopsies pending · Abdominal distension - I've looked at KUB and there is still diffuse small and large bowel distension. He is not vomiting or belching or having hiccoughs. I believe this is more an ileus than a complete obstruction based on the pattern on the xray and the findings on flex sig. His rectal finding did lead to narrowing of the lumen but the endoscope passed the area with scant resistance, suggesting a luminal diameter of 11-12mm or more. He is passing liquid stool but remains distended. I don't think he requires NG at this point and I am hopeful with time the ileus, which is attributed to a hypothetical antecedent viral gastroenteritis as suggested on CT, will resolve. If he begins to vomit would make NPO and place NG. Barring that he can have sips/chips if desired. The rectal lesion seen is too low to stent, because the stent would exit the anus and potentially cause pressure necrosis and certainly would cause irritation and pain of the anal/perianal tissues. Following. Subjective: · Im swollen Location:  abdomen Duration: days Timing: constant Radiation: none Associated Symptoms: no vomiting, no upper abdominal pain, no bleeding. Liquid BM this morning. Aggravating/Alleviating factors:  
 
ROS:  The previous review of systems on initial consultation / H&P is noted and reviewed. Specific changes noted above in HPI. Current Medications:  
 
Current Facility-Administered Medications Medication Dose Route Frequency  labetaloL (NORMODYNE) tablet 100 mg  100 mg Oral Q12H  
 dextrose 5% - 0.45% NaCl with KCl 40 mEq/L infusion   IntraVENous CONTINUOUS  
 heparin (porcine) injection 5,000 Units  5,000 Units SubCUTAneous Q8H  
  metroNIDAZOLE (FLAGYL) IVPB premix 500 mg  500 mg IntraVENous Q12H  
 benzocaine (HURRICANE) 20 % spray   Mucous Membrane PRN  
 sodium chloride (NS) flush 5-40 mL  5-40 mL IntraVENous Q8H  
 sodium chloride (NS) flush 5-40 mL  5-40 mL IntraVENous PRN  
 acetaminophen (TYLENOL) tablet 650 mg  650 mg Oral Q4H PRN  
 morphine injection 1 mg  1 mg IntraVENous Q4H PRN  
 ondansetron (ZOFRAN) injection 4 mg  4 mg IntraVENous Q4H PRN  
 amLODIPine (NORVASC) tablet 10 mg  10 mg Oral DAILY  cefepime (MAXIPIME) 2 g in 0.9% sodium chloride (MBP/ADV) 100 mL  2 g IntraVENous Q12H  
 labetaloL (NORMODYNE;TRANDATE) 20 mg/4 mL (5 mg/mL) injection 10 mg  10 mg IntraVENous Q4H PRN  
 aspirin delayed-release tablet 81 mg  81 mg Oral DAILY  pantoprazole (PROTONIX) 40 mg in 0.9% sodium chloride 10 mL injection  40 mg IntraVENous DAILY  sucralfate (CARAFATE) tablet 1 g  1 g Oral AC&HS  ondansetron (ZOFRAN) injection 4 mg  4 mg IntraVENous Q1H PRN Objective: VITALS:  
Last 24hrs VS reviewed since prior progress note. Most recent are: 
Visit Vitals /87 (BP 1 Location: Left arm, BP Patient Position: At rest) Pulse 67 Temp 98.1 °F (36.7 °C) Resp 20 Ht 5' 8\" (1.727 m) Wt 86.1 kg (189 lb 13.1 oz) SpO2 99% BMI 28.86 kg/m² Temp (24hrs), Av.5 °F (36.9 °C), Min:97.5 °F (36.4 °C), Max:100.4 °F (38 °C) Intake/Output Summary (Last 24 hours) at 2020 1435 Last data filed at 2020 1555 Gross per 24 hour Intake 350 ml Output  Net 350 ml EXAM: 
General:  Comfortable, no distress   
HEENT:  Atraumatic skull, pupils equal 
Lungs:   No abnormal audible breath sounds. Speaking in complete sentences Heart:   No abnormal audible heart sounds. Well perfused Abdomen:   distended, nontender. No mass, guarding or rebound Musc:   No skeletal defects or deformities Neurologic:   Cranial nerves grossly intact, moves all 4 extremities Psych:    Good insight. Not anxious nor agitated Derm:   No rash, jaundice, nor palpable dermatologic mass Lab Data Reviewed:  
Recent Labs 05/02/20 
0359 05/01/20 
0400 04/29/20 
2201 WBC 13.5* 15.0* 17.0*  
HGB 13.6 14.0 15.5 HCT 41.7 42.6 46.9  183 217 Recent Labs 05/02/20 
0359 05/01/20 
0400 04/30/20 
1118 04/29/20 
2201  144 145 142  
K 3.3* 3.0* 3.3* 3.6 * 115* 115* 112* CO2 18* 19* 19* 20* * 90 109* 133* BUN 31* 34* 33* 34* CREA 1.46* 1.72* 1.83* 2.24* CA 7.7* 8.0* 7.8* 8.1*  
MG 1.8 1.9  --  2.0 PHOS 2.3* 2.8 3.0  --   
ALB 2.6* 2.7* 2.9* 2.9* TBILI 0.8 0.9  --  1.0 SGOT 14* 11*  --  17 ALT 12 13  --  16 No results found for: [facility] No results for input(s): PH, PCO2, PO2, HCO3, FIO2 in the last 72 hours. No results for input(s): INR, INREXT in the last 72 hours. Assessment: (See above) Active Problems: 
  Enteritis (4/30/2020) Renal insufficiency (4/30/2020) Dementia ([address] (4/30/2020) Elevated troponin (4/30/2020) Leukocytosis (4/30/2020) HTN (hypertension) (4/30/2020) Plan: (See above) Signed By: Ange Marmolejo MD   
 5/2/2020  2:35 PM

## 2020-05-02 NOTE — PROGRESS NOTES
Trey Kincaid robb [place] 80 
[address] [facility], [address] [place] [address] 
[phone] Medical Progress Note NAME: Dasia Harris :  1940 MRM:  [id number] Date/Time: 2020 Assessment / Plan:  
 
  Enteritis: CT showed extensive small and large bowel distention w/ fluid-filled loops of small and large bowel, possible severe enteritis. Distal circumferential rectal wall thickening concerning for cancer. Underwent flex sig yesterday, with findings concerning for rectal carcinoma, discussed with Dr. Gordon Officer. Biopsies pending. Oncology consulted, discussed with Dr. Helder Alfaro, recommending staging CTs with contrast when renal function improves. Send CEA. Continue cefepime and Flagyl for now. Keep NPO due to abdominal distension and concerns with development of obstruction. Repeat KUB continues to show extensive bowel distension. Low threshold to place NGT. CRS consulted Renal insufficiency: VERONIQUE vs CKD3-4. T showed circumferential bladder wall thickening most likely related to chronic outlet obstruction. UA consistent with UTI. Continue cefepime as above. Follow urine culture Dementia: answered questions appropriately. Supportive care. At risk for delirium Elevated troponin: minimally elevated, remaining flat, in the setting of renal insufficiency. TTE showed preserved LVEF w/o RWMA. Continue ASA. Appreciate cardiology evaluation. Leukocytosis: likely from infectious vs malignant process. Follow CBC  
 
  HTN (hypertension): increased Norvasc. Add PO labetalol. Continue IV labetalol PRN Total time spent:  35 minutes Time spent in the care of this patient including reviewing records, discussing with nursing and/or other providers on the treatment team, obtaining history and examining the patient, and discussing treatment plans.  
               
Care Plan discussed with: Patient, Nursing Staff and >50% of time spent in counseling and coordination of care Discussed:  Care Plan and D/C Planning Prophylaxis:  Hep SQ Disposition:  HH PT, OT, RN Subjective: Chief Complaint:  Follow up enteritis Chart/notes/labs/studies reviewed, patient examined. Nauseated and vomited per nursing report this morning. Abdominal bloating. No fevers. No CP or SOB Objective:  
 
 
Vitals:  
 
  
Last 24hrs VS reviewed since prior progress note. Most recent are: 
 
Visit Vitals /87 (BP 1 Location: Left arm, BP Patient Position: At rest) Pulse 67 Temp 98.1 °F (36.7 °C) Resp 20 Ht 5' 8\" (1.727 m) Wt 86.1 kg (189 lb 13.1 oz) SpO2 99% BMI 28.86 kg/m² SpO2 Readings from Last 6 Encounters:  
05/02/20 99% Intake/Output Summary (Last 24 hours) at 5/2/2020 1324 Last data filed at 5/1/2020 1555 Gross per 24 hour Intake 350 ml Output  Net 350 ml Exam:  
 
Physical Exam: 
 
Gen:  Well-developed, well-nourished, in no acute distress. Pleasant HEENT:  Sclerae nonicteric, hearing intact to voice, mucous membranes moist 
Neck:  Supple, without masses. Resp:  No accessory muscle use, CTAB without wheezes, rales, or rhonchi 
Card: RRR, without m/r/g. No LE edema. Abd:  Hypoactive bowel sounds, soft, mild TTP. No rebound, distended . Neuro: Face symmetric, tongue midline, speech fluent, follows commands appropriately Psych:  Alert, oriented to self and hospital. Limited insight Medications Reviewed: (see below) Lab Data Reviewed: (see below) 
 
______________________________________________________________________ Medications:  
 
Current Facility-Administered Medications Medication Dose Route Frequency  labetaloL (NORMODYNE) tablet 100 mg  100 mg Oral Q12H  
 dextrose 5% - 0.45% NaCl with KCl 40 mEq/L infusion   IntraVENous CONTINUOUS  
 heparin (porcine) injection 5,000 Units  5,000 Units SubCUTAneous Q8H  
  metroNIDAZOLE (FLAGYL) IVPB premix 500 mg  500 mg IntraVENous Q12H  
 benzocaine (HURRICANE) 20 % spray   Mucous Membrane PRN  
 sodium chloride (NS) flush 5-40 mL  5-40 mL IntraVENous Q8H  
 sodium chloride (NS) flush 5-40 mL  5-40 mL IntraVENous PRN  
 acetaminophen (TYLENOL) tablet 650 mg  650 mg Oral Q4H PRN  
 morphine injection 1 mg  1 mg IntraVENous Q4H PRN  
 ondansetron (ZOFRAN) injection 4 mg  4 mg IntraVENous Q4H PRN  
 amLODIPine (NORVASC) tablet 10 mg  10 mg Oral DAILY  cefepime (MAXIPIME) 2 g in 0.9% sodium chloride (MBP/ADV) 100 mL  2 g IntraVENous Q12H  
 labetaloL (NORMODYNE;TRANDATE) 20 mg/4 mL (5 mg/mL) injection 10 mg  10 mg IntraVENous Q4H PRN  
 aspirin delayed-release tablet 81 mg  81 mg Oral DAILY  pantoprazole (PROTONIX) 40 mg in 0.9% sodium chloride 10 mL injection  40 mg IntraVENous DAILY  sucralfate (CARAFATE) tablet 1 g  1 g Oral AC&HS  ondansetron (ZOFRAN) injection 4 mg  4 mg IntraVENous Q1H PRN Lab Review:  
 
Recent Labs 05/02/20 
0359 05/01/20 
0400 04/29/20 
2201 WBC 13.5* 15.0* 17.0*  
HGB 13.6 14.0 15.5 HCT 41.7 42.6 46.9  183 217 Recent Labs 05/02/20 
0359 05/01/20 
0400 04/30/20 
1118 04/29/20 
2201  144 145 142  
K 3.3* 3.0* 3.3* 3.6 * 115* 115* 112* CO2 18* 19* 19* 20* * 90 109* 133* BUN 31* 34* 33* 34* CREA 1.46* 1.72* 1.83* 2.24* CA 7.7* 8.0* 7.8* 8.1*  
MG 1.8 1.9  --  2.0 PHOS 2.3* 2.8 3.0  --   
ALB 2.6* 2.7* 2.9* 2.9*  
SGOT 14* 11*  --  17 ALT 12 13  --  16 No components found for: [place] No results for input(s): PH, PCO2, PO2, HCO3, FIO2 in the last 72 hours. No results for input(s): INR, INREXT, INREXT in the last 72 hours. No results found for: SDES Lab Results Component Value Date/Time  Culture result: APPARENT ALPHA STREPTOCOCCUS (A) 04/30/2020 11:32 AM  
 Culture result: CULTURE IN PROGRESS,FURTHER UPDATES TO FOLLOW 04/30/2020 11:32 AM  
 Culture result: NO GROWTH 2 DAYS 04/30/2020 01:40 AM  
 
        
___________________________________________________ Attending Physician: Gerardo Colbert MD

## 2020-05-02 NOTE — ROUTINE PROCESS
Patient continuously asks to eat. Asked Dr. Vandana Potter, stated patient may have sips of clears. Patient very unhappy, stating we are \"starving him\". Not satisfied when offered water, apple juice, or sprite.

## 2020-05-03 NOTE — PROGRESS NOTES
[facility] Medical Progress Note NAME: Darlene Gentile :  1940 MRM:  [id number] Date/Time of service 5/3/2020  8:27 AM    
 
  
Assessment and Plan:  
 
Enteritis / Leukocytosis - CT with SB and LB distention and fluid-filled loops. Rectal wall thickening concerning for cancer. Rectal carcinoma suspected by Flex Sig, Bx pending. GI, CRS and Oncology consulted. Continue cefepime and Flagyl for now. Still NPO due to concern with development of obstruction. Low threshold to place NGT.  
  
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 T showed circumferential bladder wall thickening most likely related to chronic outlet obstruction. UA consistent with UTI, alpha strep on Ur Cx. Continue cefepime as above.  
  
Dementia - Mild. Supportive care. At risk for delirium 
  
HTN (hypertension): increased Norvasc. Add PO labetalol. Continue IV labetalol PRN Elevated troponin - POA, strain, due to renal insufficiency. TTE normal.  Appreciate cardiology evaluation.  
  
  
Subjective: Chief Complaint:  Weak, distended ROS: 
(bold if positive, if negative) Abd PainTolerating some PT  NPO Objective:  
 
Last 24hrs VS reviewed since prior progress note. Most recent are: 
 
Visit Vitals /89 (BP 1 Location: Left arm, BP Patient Position: At rest) Pulse 67 Temp 97.4 °F (36.3 °C) Resp 18 Ht 5' 8\" (1.727 m) Wt 86.1 kg (189 lb 13.1 oz) SpO2 98% BMI 28.86 kg/m² SpO2 Readings from Last 6 Encounters:  
20 98% Intake/Output Summary (Last 24 hours) at 5/3/2020 0827 Last data filed at 2020 1706 Gross per 24 hour Intake 20 ml Output  Net 20 ml Physical Exam: 
 
Gen:  Well-developed, well-nourished, in no acute distress HEENT:  Pink conjunctivae, PERRL, hearing intact to voice, moist mucous membranes Neck:  Supple, without masses, thyroid non-tender Resp:  No accessory muscle use, clear breath sounds without wheezes rales or rhonchi 
Card:  No murmurs, normal S1, S2 without thrills, bruits or peripheral edema Abd:  Soft, non-tender, distended, reduced bowel sounds are present, no mass Lymph:  No cervical or inguinal adenopathy Musc:  No cyanosis or clubbing Skin:  No rashes or ulcers, skin turgor is good Neuro:  Cranial nerves are grossly intact, general motor weakness, follows commands vaguely Psych:  Poor insight, oriented to person, place, sleepy Telemetry reviewed:   normal sinus rhythm 
__________________________________________________________________ Medications Reviewed: (see below) Medications:  
 
Current Facility-Administered Medications Medication Dose Route Frequency  labetaloL (NORMODYNE) tablet 100 mg  100 mg Oral Q12H  
 dextrose 5% - 0.45% NaCl with KCl 40 mEq/L infusion   IntraVENous CONTINUOUS  
 heparin (porcine) injection 5,000 Units  5,000 Units SubCUTAneous Q8H  
 metroNIDAZOLE (FLAGYL) IVPB premix 500 mg  500 mg IntraVENous Q12H  
 benzocaine (HURRICANE) 20 % spray   Mucous Membrane PRN  
 sodium chloride (NS) flush 5-40 mL  5-40 mL IntraVENous Q8H  
 sodium chloride (NS) flush 5-40 mL  5-40 mL IntraVENous PRN  
 acetaminophen (TYLENOL) tablet 650 mg  650 mg Oral Q4H PRN  
 morphine injection 1 mg  1 mg IntraVENous Q4H PRN  
 ondansetron (ZOFRAN) injection 4 mg  4 mg IntraVENous Q4H PRN  
 amLODIPine (NORVASC) tablet 10 mg  10 mg Oral DAILY  cefepime (MAXIPIME) 2 g in 0.9% sodium chloride (MBP/ADV) 100 mL  2 g IntraVENous Q12H  
 labetaloL (NORMODYNE;TRANDATE) 20 mg/4 mL (5 mg/mL) injection 10 mg  10 mg IntraVENous Q4H PRN  
 aspirin delayed-release tablet 81 mg  81 mg Oral DAILY  pantoprazole (PROTONIX) 40 mg in 0.9% sodium chloride 10 mL injection  40 mg IntraVENous DAILY  sucralfate (CARAFATE) tablet 1 g  1 g Oral AC&HS  ondansetron (ZOFRAN) injection 4 mg  4 mg IntraVENous Q1H PRN Lab Data Reviewed: (see below) Lab Review:  
 
Recent Labs 05/03/20 0013 05/02/20 
0359 05/01/20 
0400 WBC 12.3* 13.5* 15.0* HGB 13.1 13.6 14.0  
HCT 40.7 41.7 42.6  170 183 Recent Labs 05/03/20 
0013 05/02/20 
0359 05/01/20 
0400  143 144  
K 3.9 3.3* 3.0*  
* 117* 115* CO2 20* 18* 19* * 101* 90  
BUN 26* 31* 34* CREA 1.56* 1.46* 1.72* CA 8.1* 7.7* 8.0*  
MG 1.7 1.8 1.9 PHOS 1.9* 2.3* 2.8 ALB 2.5* 2.6* 2.7* TBILI  --  0.8 0.9 SGOT  --  14* 11* ALT  --  12 13 No results found for: [facility] No results for input(s): PH, PCO2, PO2, HCO3, FIO2 in the last 72 hours. No results for input(s): INR, INREXT in the last 72 hours. All Micro Results Procedure Component Value Units Date/Time CULTURE, BLOOD [[id number]] Collected:  04/30/20 0140 Order Status:  Completed Specimen:  Blood Updated:  05/03/20 4717 Special Requests: NO SPECIAL REQUESTS Culture result: NO GROWTH 3 DAYS     
 CULTURE, URINE [[id number]]  (Abnormal) Collected:  04/30/20 1132 Order Status:  Completed Specimen:  Urine Updated:  05/01/20 1111 Special Requests: --     
  NO SPECIAL REQUESTS Reflexed from [id number] [place] Count --     
  >100,000 COLONIES/mL Culture result:    
  APPARENT ALPHA STREPTOCOCCUS  
     
      
  CULTURE IN PROGRESS,FURTHER UPDATES TO FOLLOW C. DIFFICILE AG & TOXIN A/B [[id number]] Order Status:  Canceled Specimen:  Stool ENTERIC BACTERIA PANEL, DNA [[id number]] Order Status:  Canceled Specimen:  Stool [facility], STOOL [[id number]] Order Status:  Sent Specimen:  Feces from Stool URINE CULTURE HOLD SAMPLE [[id number]] Order Status:  Canceled Specimen:  Urine Other pertinent lab: none Total time spent with patient: 39 Minutes I personally reviewed chart, notes, data and current medications in the medical record. I have personally examined and treated the patient at bedside during this period. Care Plan discussed with: Patient, Nursing Staff and >50% of time spent in counseling and coordination of care Discussed:  Care Plan Prophylaxis:  H2B/PPI Disposition:  HH PT, OT, RN 
        
___________________________________________________ Attending Physician: Marv Kline MD

## 2020-05-03 NOTE — CONSULTS
Pt . Seen and examined. Chart reviewed and CT evaluated. 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 Pt will need a diverting colostomy for decompression and subsequent chemoradiation for the rectal  Lesion depending on pathology. Discussed with patient and will speak with family.

## 2020-05-03 NOTE — ROUTINE PROCESS
Bedside and Verbal shift change report given to Ramin Ramsey RN (oncoming nurse) by [facility], RN (offgoing nurse). Report included the following information SBAR, Intake/Output, MAR and Recent Results.

## 2020-05-03 NOTE — PROGRESS NOTES
[facility]. Tila Motta, [address] [place] [address] 
[phone] office 
[phone] [facility] Progress Note May 3, 2020 Admit Date: 4/29/2020 Narrative Assessment and Plan · Abdominal distension · Rectal mass biopsies pending Continues with abdominal distension. No vomiting Dr. Chapincito Parada assistance appreciated. Awaiting biopsy results If vomiting - NG. Subjective:  
· I'm the same Location:  Denies vomiting Duration: no fever Timing: occasional crampy pain Radiation: no bleeding Associated Symptoms:  
Aggravating/Alleviating factors: 
 
ROS:  The previous review of systems on initial consultation / H&P is noted and reviewed. Specific changes noted above in HPI. Current Medications:  
 
Current Facility-Administered Medications Medication Dose Route Frequency  labetaloL (NORMODYNE) tablet 100 mg  100 mg Oral Q12H  
 dextrose 5% - 0.45% NaCl with KCl 40 mEq/L infusion   IntraVENous CONTINUOUS  
 heparin (porcine) injection 5,000 Units  5,000 Units SubCUTAneous Q8H  
 metroNIDAZOLE (FLAGYL) IVPB premix 500 mg  500 mg IntraVENous Q12H  
 benzocaine (HURRICANE) 20 % spray   Mucous Membrane PRN  
 sodium chloride (NS) flush 5-40 mL  5-40 mL IntraVENous Q8H  
 sodium chloride (NS) flush 5-40 mL  5-40 mL IntraVENous PRN  
 acetaminophen (TYLENOL) tablet 650 mg  650 mg Oral Q4H PRN  
 morphine injection 1 mg  1 mg IntraVENous Q4H PRN  
 ondansetron (ZOFRAN) injection 4 mg  4 mg IntraVENous Q4H PRN  
 amLODIPine (NORVASC) tablet 10 mg  10 mg Oral DAILY  cefepime (MAXIPIME) 2 g in 0.9% sodium chloride (MBP/ADV) 100 mL  2 g IntraVENous Q12H  
 labetaloL (NORMODYNE;TRANDATE) 20 mg/4 mL (5 mg/mL) injection 10 mg  10 mg IntraVENous Q4H PRN  
 aspirin delayed-release tablet 81 mg  81 mg Oral DAILY  pantoprazole (PROTONIX) 40 mg in 0.9% sodium chloride 10 mL injection  40 mg IntraVENous DAILY  sucralfate (CARAFATE) tablet 1 g  1 g Oral AC&HS  
  ondansetron (ZOFRAN) injection 4 mg  4 mg IntraVENous Q1H PRN Objective: VITALS:  
Last 24hrs VS reviewed since prior progress note. Most recent are: 
Visit Vitals /89 (BP 1 Location: Left arm, BP Patient Position: At rest) Pulse 67 Temp 97.4 °F (36.3 °C) Resp 18 Ht 5' 8\" (1.727 m) Wt 86.1 kg (189 lb 13.1 oz) SpO2 98% BMI 28.86 kg/m² Temp (24hrs), Av.8 °F (36.6 °C), Min:97.4 °F (36.3 °C), Max:98.1 °F (36.7 °C) Intake/Output Summary (Last 24 hours) at 5/3/2020 1228 Last data filed at 2020 1706 Gross per 24 hour Intake 20 ml Output  Net 20 ml EXAM: 
General:  Comfortable, no distress   
HEENT:  Atraumatic skull, pupils equal 
Lungs:   No abnormal audible breath sounds. Speaking in complete sentences Heart:   No abnormal audible heart sounds. Well perfused Abdomen:   distended, nontender. No mass, guarding or rebound Musc:   No skeletal defects or deformities Neurologic:   Cranial nerves grossly intact, moves all 4 extremities Psych:    Good insight. Not anxious nor agitated Derm:   No rash, jaundice, nor palpable dermatologic mass Lab Data Reviewed:  
Recent Labs 20 
0013 20 
0359 20 
0400 WBC 12.3* 13.5* 15.0* HGB 13.1 13.6 14.0  
HCT 40.7 41.7 42.6  170 183 Recent Labs 20 
0013 20 
0359 20 
0400  143 144  
K 3.9 3.3* 3.0*  
* 117* 115* CO2 20* 18* 19* * 101* 90  
BUN 26* 31* 34* CREA 1.56* 1.46* 1.72* CA 8.1* 7.7* 8.0*  
MG 1.7 1.8 1.9 PHOS 1.9* 2.3* 2.8 ALB 2.5* 2.6* 2.7* TBILI  --  0.8 0.9 SGOT  --  14* 11* ALT  --  12 13 No results found for: Enid Weston No results for input(s): PH, PCO2, PO2, HCO3, FIO2 in the last 72 hours. No results for input(s): INR, INREXT in the last 72 hours. Assessment: (See above) Active Problems: 
  Enteritis (2020) Renal insufficiency (2020) Dementia ([facility] [address] (2020) Elevated troponin (4/30/2020) Leukocytosis (4/30/2020) HTN (hypertension) (4/30/2020) Plan: (See above) Signed By: Lakshmi Mcmahan MD   
 5/3/2020  12:28 PM

## 2020-05-03 NOTE — PROGRESS NOTES
[id number] [facility] at [facility] [phone] Hematology / Oncology Consult F/U Reason for Visit:  
Chandler Silva is a 67 y.o. male who is seen in consultation at the request of Dr. Sonya Pack for evaluation of probable rectal cancer. History of Present Illness:  
Chandler Silva is a 67 y.o. male with mild dementia admitted with n/v, found to have rectal mass. He was admitted 4/30/20 after 10 days of n/v/d refractory to trial of antiemetic at home. Imaging showed small and large bowel distention as well as possible enteritis. Admission labs notable for WBC 17, Cr 2.24. Blood cx negative, but urine culture growing apparent alpha streptococcus. Pt currently on Cefepime and Flagyl. Pt evaluated by Dr. Jae Car in GI, found to have firm nodular mass in rectum. Per patient's step daughter, patient has been weak for the past 2 weeks. However, prior to that he has been active with dressing himself, taking walks in the neighborhood. Pt did have prior colonoscopy approx 10 yrs ago at [facility] thinks her mother might have mentioned that pt had colon cancer in the past, but she is unsure. In further speaking with step-daughter, she states she is patient's POA. She states his short-term memory has been quite poor and often cannot remember whether he ate that day or not. She does not feel that he would want aggressive treatment measures. Interval history: 
Patient continues to have intermittent hiccups, but he denies n/v. He does report some pressure in his stomach as well as gas. Dr. Vandana Godoy from colorectal surgery has evaluated pt and recommends diverting colostomy for decompression once pathology confirmed. History reviewed. No pertinent past medical history. Past Surgical History:  
Procedure Laterality Date 2021 Dee Becerra N/A 5/1/2020 SIGMOIDOSCOPY FLEXIBLE performed by Jarett Harrison MD at [address] [place] [address] Social History Tobacco Use  Smoking status: Former Smoker  Smokeless tobacco: Never Used Substance Use Topics  Alcohol use: Not on file History reviewed. No pertinent family history. Current Facility-Administered Medications Medication Dose Route Frequency  labetaloL (NORMODYNE) tablet 100 mg  100 mg Oral Q12H  
 dextrose 5% - 0.45% NaCl with KCl 40 mEq/L infusion   IntraVENous CONTINUOUS  
 heparin (porcine) injection 5,000 Units  5,000 Units SubCUTAneous Q8H  
 metroNIDAZOLE (FLAGYL) IVPB premix 500 mg  500 mg IntraVENous Q12H  
 benzocaine (HURRICANE) 20 % spray   Mucous Membrane PRN  
 sodium chloride (NS) flush 5-40 mL  5-40 mL IntraVENous Q8H  
 sodium chloride (NS) flush 5-40 mL  5-40 mL IntraVENous PRN  
 acetaminophen (TYLENOL) tablet 650 mg  650 mg Oral Q4H PRN  
 morphine injection 1 mg  1 mg IntraVENous Q4H PRN  
 ondansetron (ZOFRAN) injection 4 mg  4 mg IntraVENous Q4H PRN  
 amLODIPine (NORVASC) tablet 10 mg  10 mg Oral DAILY  cefepime (MAXIPIME) 2 g in 0.9% sodium chloride (MBP/ADV) 100 mL  2 g IntraVENous Q12H  
 labetaloL (NORMODYNE;TRANDATE) 20 mg/4 mL (5 mg/mL) injection 10 mg  10 mg IntraVENous Q4H PRN  
 aspirin delayed-release tablet 81 mg  81 mg Oral DAILY  pantoprazole (PROTONIX) 40 mg in 0.9% sodium chloride 10 mL injection  40 mg IntraVENous DAILY  sucralfate (CARAFATE) tablet 1 g  1 g Oral AC&HS  ondansetron (ZOFRAN) injection 4 mg  4 mg IntraVENous Q1H PRN No Known Allergies Review of Systems: A complete review of systems was obtained, negative except as described above. Physical Exam:  
 
Visit Vitals /85 (BP 1 Location: Right arm, BP Patient Position: At rest) Pulse (!) 56 Temp 98.3 °F (36.8 °C) Resp 18 Ht 5' 8\" (1.727 m) Wt 189 lb 13.1 oz (86.1 kg) SpO2 98% BMI 28.86 kg/m² ECOG PS: 2 General: No distress Eyes: PERRLA, anicteric sclerae HENT: Atraumatic with normal appearance of ears and nose; OP clear Neck: Supple; no thyromegaly Lymphatic: No cervical, supraclavicular, or inguinal adenopathy Respiratory: CTAB, normal respiratory effort CV: Normal rate, regular rhythm, no murmurs, no peripheral edema GI: Soft but mildly distended, nontender, no hepatomegaly, no splenomegaly MS: Normal gait and station. Digits without clubbing or cyanosis. Skin: No rashes, ecchymoses, or petechiae. Normal temperature, turgor, and texture. Neuro/Psych: Alert, oriented to person, place, year but not month. Appropriate affect, questionable memory,  judgment/insight Results:  
 
Lab Results Component Value Date/Time WBC 12.3 (H) 2020 12:13 AM  
 HGB 13.1 2020 12:13 AM  
 HCT 40.7 2020 12:13 AM  
 PLATELET 131 3581 12:13 AM  
 MCV 93.6 2020 12:13 AM  
 ABS. NEUTROPHILS 10.1 (H) 2020 12:13 AM  
 
Lab Results Component Value Date/Time Sodium 143 2020 12:13 AM  
 Potassium 3.9 2020 12:13 AM  
 Chloride 117 (H) 2020 12:13 AM  
 CO2 20 (L) 2020 12:13 AM  
 Glucose 110 (H) 2020 12:13 AM  
 BUN 26 (H) 2020 12:13 AM  
 Creatinine 1.56 (H) 2020 12:13 AM  
 GFR est AA 52 (L) 2020 12:13 AM  
 GFR est non-AA 43 (L) 2020 12:13 AM  
 Calcium 8.1 (L) 2020 12:13 AM  
 
Lab Results Component Value Date/Time Bilirubin, total 0.8 2020 03:59 AM  
 ALT (SGPT) 12 2020 03:59 AM  
 AST (SGOT) 14 (L) 2020 03:59 AM  
 Alk. phosphatase 56 2020 03:59 AM  
 Protein, total 5.9 (L) 2020 03:59 AM  
 Albumin 2.5 (L) 2020 12:13 AM  
 Globulin 3.3 2020 03:59 AM  
 
 
Imagin/29/20 KUB: IMPRESSION: Gaseous small and large bowel distention. 30 abd/pelvis CT: IMPRESSION: 
Extensive small and large bowel distention. Fluid-filled loops of small and 
large bowel. The absence of IV contrast limits evaluation, consider severe enteritis. Distal circumferential rectal wall thickening may be related to 
infectious process, neoplastic etiology is not excluded. Cholelithiasis. Circumferential bladder wall thickening most likely related to chronic outlet 
obstruction. Neoplastic etiologies deemed less likely. Additional incidental findings are as described above. 5/2/20 KUB: IMPRESSION: Continued large and small bowel distention. Procedures: 
 
Flex sig 5/1/20: Findings: This was an enema prep as he would not tolerate PO prep due to his gastroenteritis. The rectum is cleansed but there is generous stool in the sigmoid. 
Deny Reagan is a malignant appearing fungating friable circumferental mass from the dentate up to 10cm. We were able to just get the scope to navigate with mild resistance, I estimate the lumen to be 11-12mm. Above 10cm there's too much stool to safely navigate. Cold forceps biopsies obtained for histology. Impression: The endoscopic impression is that of rectal carcinoma. Biopsies pending. He and daughter report previous colonoscopy and GI evaluation having been done years ago at [facility] but I have no access to those records.  
  
Recommendations: - Await pathology. - CT has been done, get CEA, get colorectal surgery consult. Assessment and Recommendations:  
Ronan Guallpa is a 67 y.o. male 1. Rectal mass: Stage unclear at this time and need to rule out distant metastases with imaging using IV contrast when feasible. Discussed with patient's step-daughter that standard of care if no distant metastatic disease would be surgery or neoadjuvant chemoradiation or definitive chemoradiation. She feels that due to patient's dementia, it would be very difficult to care for him and she does not feel he would want aggressive measures of treatment. She is more interested in palliative options.  Dr. Keny Tran from colorectal surgery recommend diverting colostomy for decompression, followed by chemoradiation. CEA 2.9. 
-- f/u biopsy results -- Chest CT with IV contrast to complete staging 
-- Will also need abd/pelvis CT with IV contrast OR abdomen/pelvis MRI once Cr improved - likely tomorrow. -- Likely plan is for diverting colostomy once pathology available. -- Consult palliative care on Monday to assist with goals of care discussion 2. VERONIQUE: 2/2 n/v and initially improved with IVF hydration - but Cr up to 1.56 today. 3. Alpha streptococcus UTI: On Cefepime, Flagyl 4. Dementia: Poor short term memory Signed By: Jenn Perez MD   
 May 3, 2020

## 2020-05-03 NOTE — PROGRESS NOTES
Problem: Risk for Spread of Infection Goal: Prevent transmission of infectious organism to others Description: Prevent the transmission of infectious organisms to other patients, staff members, and visitors. Outcome: Progressing Towards Goal 
  
Problem: Falls - Risk of 
Goal: *Absence of Falls Description: Document Stephane Cornell Fall Risk and appropriate interventions in the flowsheet. Outcome: Progressing Towards Goal 
Note: Fall Risk Interventions: 
Mobility Interventions: Bed/chair exit alarm, Communicate number of staff needed for ambulation/transfer, OT consult for ADLs, Patient to call before getting OOB, PT Consult for mobility concerns, PT Consult for assist device competence, Utilize walker, cane, or other assistive device, Utilize gait belt for transfers/ambulation Mentation Interventions: Adequate sleep, hydration, pain control, Bed/chair exit alarm, Door open when patient unattended, Evaluate medications/consider consulting pharmacy, Gait belt with transfers/ambulation, More frequent rounding, Increase mobility, Reorient patient, Toileting rounds Medication Interventions: Bed/chair exit alarm, Evaluate medications/consider consulting pharmacy, Patient to call before getting OOB, Teach patient to arise slowly, Utilize gait belt for transfers/ambulation Elimination Interventions: Bed/chair exit alarm, Call light in reach, Patient to call for help with toileting needs, Toileting schedule/hourly rounds, Urinal in reach Problem: Pressure Injury - Risk of 
Goal: *Prevention of pressure injury Description: Document Baltazar Scale and appropriate interventions in the flowsheet.  
Outcome: Progressing Towards Goal 
Note: Pressure Injury Interventions: 
Sensory Interventions: Check visual cues for pain, Assess need for specialty bed, Assess changes in LOC, Discuss PT/OT consult with provider, Keep linens dry and wrinkle-free, Maintain/enhance activity level, Monitor skin under medical devices, Pressure redistribution bed/mattress (bed type), Turn and reposition approx. every two hours (pillows and wedges if needed) Moisture Interventions: Absorbent underpads, Maintain skin hydration (lotion/cream) Activity Interventions: Increase time out of bed, Pressure redistribution bed/mattress(bed type), PT/OT evaluation Mobility Interventions: HOB 30 degrees or less, Pressure redistribution bed/mattress (bed type), PT/OT evaluation, Turn and reposition approx. every two hours(pillow and wedges) Nutrition Interventions: Document food/fluid/supplement intake, Offer support with meals,snacks and hydration Friction and Shear Interventions: HOB 30 degrees or less, Transferring/repositioning devices

## 2020-05-03 NOTE — ROUTINE PROCESS
Bedside shift change report given to Rene Jack (oncoming nurse) by Cintia Nielsen (offgoing nurse). Report included the following information SBAR, Kardex, Intake/Output, MAR, Accordion, Recent Results and Med Rec Status.

## 2020-05-03 NOTE — CONSULTS
Palliative Medicine Consult [place]: [phone]) Patient Name: Janis Fajardo YOB: 1940 Date of Initial Consult: 5/3/2020 Reason for Consult: Care decisions Requesting Provider: Dr. Bruno Vargas Primary Care Physician: Andrea Loco MD 
 
 SUMMARY:  
Janis Fajardo is a 67 y.o. with a past history of mild to moderate dementia, possible chronic kidney disease, hypertension, who was admitted on 4/29/2020 from home with a diagnosis of abdominal distention. Current medical issues leading to Palliative Medicine involvement include: Care decisions. Chart reviewpatient is a 59-year-old male admitted to the hospital on 4/30 with increasing abdominal distention, nausea, and vomiting. CT scan showing extensive small and large bowel distention with fluid filled loops of small and large bowel. There appeared to be severe enteritis as well as distal circumferential rectal wall thickening. Patient subsequently had endoscopic evaluation which was concerning for rectal carcinoma. He also has been seen by colorectal surgery who subsequently thinks he will need a diverting colostomy secondary to ongoing abdominal distention. Our team is been asked to see him for goals of care Social history patient apparently lived in [place]. 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 Apparently has 3 sons in the [address] [place] [address] area. He lives with his stepdaughter, Rigoberto Saunders. He was  to her mom for 20+ years. She states she has a routine for him at home. She usually gets them up around 730. He continues to dress like he is going to work every day. But she is noticed over the last several weeks that has had decreased p.o. intake and much more fatigued. PALLIATIVE DIAGNOSES:  
1. Goals of care discussion 2. DNR discussion 3. Suspected rectal cancer 4. Mild-moderate dementia 5. Debility 6. Advance care planning  PLAN:  
 1. Met with patient and introduced the role of palliative medicine. Attempted to ascertain his understanding of the current medical issues. He claims that the doctors \"have not told him anything \". He states he feels fine and is not sure why is here in the hospital.  Attempted to remind him about the suspected rectal cancer and once again, he acts as if this is the first time he has heard this. He has no recollection of any discussions that have been had. This appears consistent with what the chart is stated about his significant short-term memory loss related to his dementia. 2. Talk with his stepdaughter via phone. She does state she is his medical power of . She will attempt to find the paperwork. She states he has completed advanced medical directive. Ideally, we should have this on record given that she is a stepdaughter. He has been living with her for several years. Reviewed the current medical issues with him in the role of palliative medicine. She is very up-to-date and understands the situation patient is in. She knows that he will need a diverting colostomy to attempt to bypass the rectal CA. She is not so sure she would want to proceed with chemotherapy/radiation after the surgery. She knows we are still waiting for final pathology. 3. Goals of careto be determined. For now, we need pathology report. Suspect patient has rectal cancer and will need a diverting colostomy. As far as further treatment after the colostomy, his stepdaughter is not sure she would want him to proceed with chemoradiation. Will have ongoing discussions. 4. Advance care planningonce again, we really need to advanced medical directive that assigns his stepdaughter as medical power of . If that cannot be obtained, technically his biological sons would be his medical power of 's and legal next of kin.   Clearly, his stepdaughter has been his caregiver and knows him best so I hope we can find the advanced medical directive. 5. CODE STATUS reviewed resuscitation with his stepdaughter and the limitations of CPR/intubation in patients of Mr. Fitzpatrick's age and his comorbidities. She does not want to see him suffer at the end of life. She seems to be leaning towards transition to no attempts at resuscitation or intubation but does want to think about this a little more. For now, patient remains a full code 6. Symptom managementinpatient team currently managing. No acute symptoms for us to manage 7. Psychosocialpatient does appear to have good support from his stepdaughter. No spiritual concerns identified 8. Discussed with bedside nurse 9. Initial consult note routed to primary continuity provider and/or primary health care team members 10. Communicated plan of care with: Palliative IDT[facility] Team 
 
 GOALS OF CARE / TREATMENT PREFERENCES:  
 
GOALS OF CARE: 
Patient/Health Care Proxy Stated Goals: Other (comment)(Waiting pathology before making a final decision on goals of care) TREATMENT PREFERENCES:  
Code Status: Full Code Advance Care Planning: 
[x] [facility] Interdisciplinary Team has updated the ACP Navigator with [place] and Patient Capacity Advance Care Planning 4/30/2020 Confirm Advance Directive None Patient Would Like to Complete Advance Directive No  
 
 
Medical Interventions: Full interventions Other Instructions: Other: As far as possible, the palliative care team has discussed with patient / health care proxy about goals of care / treatment preferences for patient. HISTORY:  
 
History obtained from: Chart, patient, stepdaughter CHIEF COMPLAINT: Nausea vomiting HPI/SUBJECTIVE: The patient is:  
[x] Verbal and participatory [] Non-participatory due to:  
Patient states he feels fine. He denies any pain.  
 
Clinical Pain Assessment (nonverbal scale for severity on nonverbal patients):  
 Clinical Pain Assessment Severity: 0 Duration: for how long has pt been experiencing pain (e.g., 2 days, 1 month, years) Frequency: how often pain is an issue (e.g., several times per day, once every few days, constant) FUNCTIONAL ASSESSMENT:  
 
Palliative Performance Scale (PPS): PPS: 50 PSYCHOSOCIAL/SPIRITUAL SCREENING:  
 
Palliative IDT has assessed this patient for cultural preferences / practices and a referral made as appropriate to needs (Cultural Services, Patient Advocacy, Ethics, etc.) Any spiritual / Catholic concerns: 
[] Yes /  [x] No 
 
Caregiver Burnout: 
[] Yes /  [] No /  [x] No Caregiver Present Anticipatory grief assessment:  
[x] Normal  / [] Maladaptive ESAS Anxiety: Anxiety: 0 
 
ESAS Depression: Depression: 0 REVIEW OF SYSTEMS:  
 
Positive and pertinent negative findings in ROS are noted above in HPI. The following systems were [x] reviewed / [] unable to be reviewed as noted in HPI Other findings are noted below. Systems: constitutional, ears/nose/mouth/throat, respiratory, gastrointestinal, genitourinary, musculoskeletal, integumentary, neurologic, psychiatric, endocrine. Positive findings noted below. Modified ESAS Completed by: provider Fatigue: 2 Drowsiness: 1 Depression: 0 Pain: 0 Anxiety: 0 Nausea: 4 Anorexia: 2 Dyspnea: 0 Constipation: No  
     
 
 
 PHYSICAL EXAM:  
 
From RN flowsheet: 
Wt Readings from Last 3 Encounters:  
04/30/20 189 lb 13.1 oz (86.1 kg) Blood pressure 142/89, pulse 73, temperature 97.7 °F (36.5 °C), resp. rate 18, height 5' 8\" (1.727 m), weight 189 lb 13.1 oz (86.1 kg), SpO2 99 %. Pain Scale 1: FLACC Pain Intensity 1: 0 Pain Onset 1: acute Pain Location 1: Abdomen Pain Orientation 1: Mid 
Pain Description 1: Aching Pain Intervention(s) 1: Medication (see MAR) Last bowel movement, if known:  
 
Constitutional: No acute distress, alert and oriented to person, knew he is in the hospital, had no recollection of why he is in the hospital 
Eyes: pupils equal, anicteric ENMT: no nasal discharge, moist mucous membranes Cardiovascular: regular rhythm, distal pulses intact Respiratory: breathing not labored, symmetric Gastrointestinal: soft, distended, hypoactive bowel sounds Musculoskeletal: no deformity, no tenderness to palpation Skin: warm, dry Neurologic: following commands, moving all extremities Psychiatric: full affect, no hallucinations Other: 
 
 
 HISTORY:  
 
Active Problems: 
  Enteritis (4/30/2020) Renal insufficiency (4/30/2020) Dementia ([address] (4/30/2020) Elevated troponin (4/30/2020) Leukocytosis (4/30/2020) HTN (hypertension) (4/30/2020) History reviewed. No pertinent past medical history. Past Surgical History:  
Procedure Laterality Date 2021 Pricelevi Jordan Hernan N/A 5/1/2020 SIGMOIDOSCOPY FLEXIBLE performed by Alex Corona MD at [address] History reviewed. No pertinent family history. History reviewed, no pertinent family history. Social History Tobacco Use  Smoking status: Former Smoker  Smokeless tobacco: Never Used Substance Use Topics  Alcohol use: Not on file No Known Allergies Current Facility-Administered Medications Medication Dose Route Frequency  labetaloL (NORMODYNE) tablet 100 mg  100 mg Oral Q12H  
 dextrose 5% - 0.45% NaCl with KCl 40 mEq/L infusion   IntraVENous CONTINUOUS  
 heparin (porcine) injection 5,000 Units  5,000 Units SubCUTAneous Q8H  
 metroNIDAZOLE (FLAGYL) IVPB premix 500 mg  500 mg IntraVENous Q12H  
 benzocaine (HURRICANE) 20 % spray   Mucous Membrane PRN  
 sodium chloride (NS) flush 5-40 mL  5-40 mL IntraVENous Q8H  
 sodium chloride (NS) flush 5-40 mL  5-40 mL IntraVENous PRN  
 acetaminophen (TYLENOL) tablet 650 mg  650 mg Oral Q4H PRN  
 morphine injection 1 mg  1 mg IntraVENous Q4H PRN  
  ondansetron (ZOFRAN) injection 4 mg  4 mg IntraVENous Q4H PRN  
 amLODIPine (NORVASC) tablet 10 mg  10 mg Oral DAILY  cefepime (MAXIPIME) 2 g in 0.9% sodium chloride (MBP/ADV) 100 mL  2 g IntraVENous Q12H  
 labetaloL (NORMODYNE;TRANDATE) 20 mg/4 mL (5 mg/mL) injection 10 mg  10 mg IntraVENous Q4H PRN  
 aspirin delayed-release tablet 81 mg  81 mg Oral DAILY  pantoprazole (PROTONIX) 40 mg in 0.9% sodium chloride 10 mL injection  40 mg IntraVENous DAILY  sucralfate (CARAFATE) tablet 1 g  1 g Oral AC&HS  ondansetron (ZOFRAN) injection 4 mg  4 mg IntraVENous Q1H PRN  
 
 
 
 LAB AND IMAGING FINDINGS:  
 
Lab Results Component Value Date/Time WBC 12.3 (H) 05/03/2020 12:13 AM  
 HGB 13.1 05/03/2020 12:13 AM  
 PLATELET 825 06/71/8514 12:13 AM  
 
Lab Results Component Value Date/Time Sodium 143 05/03/2020 12:13 AM  
 Potassium 3.9 05/03/2020 12:13 AM  
 Chloride 117 (H) 05/03/2020 12:13 AM  
 CO2 20 (L) 05/03/2020 12:13 AM  
 BUN 26 (H) 05/03/2020 12:13 AM  
 Creatinine 1.56 (H) 05/03/2020 12:13 AM  
 Calcium 8.1 (L) 05/03/2020 12:13 AM  
 Magnesium 1.7 05/03/2020 12:13 AM  
 Phosphorus 1.9 (L) 05/03/2020 12:13 AM  
  
Lab Results Component Value Date/Time AST (SGOT) 14 (L) 05/02/2020 03:59 AM  
 Alk. phosphatase 56 05/02/2020 03:59 AM  
 Protein, total 5.9 (L) 05/02/2020 03:59 AM  
 Albumin 2.5 (L) 05/03/2020 12:13 AM  
 Globulin 3.3 05/02/2020 03:59 AM  
 
No results found for: INR, PTMR, PTP, PT1, PT2, APTT, INREXT, INREXT No results found for: IRON, FE, TIBC, IBCT, PSAT, FERR No results found for: PH, PCO2, PO2 No components found for: [place] No results found for: CPK, CKMB Total time: 70 
Counseling / coordination time, spent as noted above: 65 
> 50% counseling / coordination?: yes Prolonged service was provided for  []30 min   []75 min in face to face time in the presence of the patient, spent as noted above. Time Start:  
Time End: Note: this can only be billed with 89939 (initial) or 83602 (follow up). If multiple start / stop times, list each separately.

## 2020-05-03 NOTE — PROGRESS NOTES
Spiritual Care Assessment/Progress Note [address] 
 
 
NAME: Dereje Graham      MRN: [id number] AGE: 67 y.o. SEX: male Anglican Affiliation: No preference Language: [place] 5/3/2020     Total Time (in minutes): 6 Spiritual Assessment begun in [facility]  MED SURG 2 through conversation with: 
  
    [x]Patient        [] Family    [] Friend(s) Reason for Consult: Palliative Care, Initial/Spiritual Assessment Spiritual beliefs: (Please include comment if needed) 
   [] Identifies with a emma tradition:     
   [] Supported by a emma community:        
   [] Claims no spiritual orientation:       
   [] Seeking spiritual identity:            
   [] Adheres to an individual form of spirituality:       
   [x] Not able to assess:                   
 
    
Identified resources for coping:  
   [] Prayer                           
   [] Music                  [] Guided Imagery 
   [] Family/friends                 [] Pet visits [] Devotional reading                         [x] Unknown 
   [] Other:                                         
 
 
Interventions offered during this visit: (See comments for more details) Plan of Care: 
 
 [] Support spiritual and/or cultural needs  
 [] Support AMD and/or advance care planning process    
 [] Support grieving process 
 [] Coordinate Rites and/or Rituals  
 [] Coordination with community clergy [] No spiritual needs identified at this time 
 [] Detailed Plan of Care below (See Comments)  [] Make referral to Music Therapy 
[] Make referral to Pet Therapy    
[] Make referral to Addiction services 
[] Make referral to [facility] 
[] Make referral to Spiritual Care Partner 
[] No future visits requested       
[x] Follow up visits as needed Comments:  attempted palliative care initial spiritual assessment of the Med Surg unit. Pt, Mr. Archie Justice was sleeping.  Pastoral care will continue to follow up as needed or upon request.  
 
Visited by: Duc Newton. To page : [phone])

## 2020-05-04 NOTE — PROGRESS NOTES
Trey Kincaid [facility] [address] YOB: 1940 Assessment & Plan: VERONIQUE due to IVVD, improving Baseline Cr unknown, likely CKD Hypokalemia, better Acidosis Diarrhea/enteritis Rec: 
Continue IVF Add po bcb Avoid nephrotoxins No need for RRT Subjective:  
CC: f/u VERONIQUE 
HPI: Creat improved ROS: no sob/n/v Current Facility-Administered Medications Medication Dose Route Frequency  TPN ADULT - CENTRAL AA 5% D20% W/ ELECTROLYTES AND CA   IntraVENous CONTINUOUS  
 labetaloL (NORMODYNE) tablet 100 mg  100 mg Oral Q12H  
 dextrose 5% - 0.45% NaCl with KCl 40 mEq/L infusion   IntraVENous CONTINUOUS  
 heparin (porcine) injection 5,000 Units  5,000 Units SubCUTAneous Q8H  
 metroNIDAZOLE (FLAGYL) IVPB premix 500 mg  500 mg IntraVENous Q12H  
 benzocaine (HURRICANE) 20 % spray   Mucous Membrane PRN  
 sodium chloride (NS) flush 5-40 mL  5-40 mL IntraVENous Q8H  
 sodium chloride (NS) flush 5-40 mL  5-40 mL IntraVENous PRN  
 acetaminophen (TYLENOL) tablet 650 mg  650 mg Oral Q4H PRN  
 morphine injection 1 mg  1 mg IntraVENous Q4H PRN  
 ondansetron (ZOFRAN) injection 4 mg  4 mg IntraVENous Q4H PRN  
 amLODIPine (NORVASC) tablet 10 mg  10 mg Oral DAILY  cefepime (MAXIPIME) 2 g in 0.9% sodium chloride (MBP/ADV) 100 mL  2 g IntraVENous Q12H  
 labetaloL (NORMODYNE;TRANDATE) 20 mg/4 mL (5 mg/mL) injection 10 mg  10 mg IntraVENous Q4H PRN  
 aspirin delayed-release tablet 81 mg  81 mg Oral DAILY  pantoprazole (PROTONIX) 40 mg in 0.9% sodium chloride 10 mL injection  40 mg IntraVENous DAILY  sucralfate (CARAFATE) tablet 1 g  1 g Oral AC&HS  ondansetron (ZOFRAN) injection 4 mg  4 mg IntraVENous Q1H PRN Objective:  
 
Vitals: 
Blood pressure 127/86, pulse 64, temperature 98.3 °F (36.8 °C), resp. rate 18, height 5' 8\" (1.727 m), weight 86.1 kg (189 lb 13.1 oz), SpO2 99 %. Temp (24hrs), Av.1 °F (36.7 °C), Min:97.7 °F (36.5 °C), Max:98.4 °F (36.9 °C) Intake and Output: 
No intake/output data recorded.  1901 -  0700 In: 61 [P.O.:60] Out: 300 Physical Exam:              
GENERAL ASSESSMENT: NAD 
CHEST: CTA HEART: S1S2 ABDOMEN: protuberant NT 
EXTREMITY: no EDEMA 
 
   
ECG/rhythm: 
 
Data Review No results for input(s): TNIPOC in the last 72 hours. No lab exists for component: ITNL No results for input(s): CPK, CKMB, TROIQ in the last 72 hours. Recent Labs 20 
1368 20 
0013 20 
1190  143 143  
K 4.2 3.9 3.3*  
* 117* 117* CO2 17* 20* 18*  
BUN 21* 26* 31* CREA 1.41* 1.56* 1.46* GLU 97 110* 101* PHOS  --  1.9* 2.3*  
MG  --  1.7 1.8  
CA 7.7* 8.1* 7.7* ALB  --  2.5* 2.6* WBC  --  12.3* 13.5* HGB  --  13.1 13.6 HCT  --  40.7 41.7 PLT  --  168 170 No results for input(s): INR, PTP, APTT, INREXT, INREXT in the last 72 hours. Needs: urine analysis, urine sodium, protein and creatinine Lab Results Component Value Date/Time Sodium,urine random 22 2020 11:32 AM  
 Creatinine, urine 280.00 2020 11:31 AM  
 
 
 
 
: Mikey Villanueva MD 
2020 [facility]: 
[email or web address] [place] office: 
[address] [place] [address], [address] [place], [address] [place] [address] Phone: [phone] Fax :     [phone] [place] office: 
[address] [facility] [address] [facility], [address] Phone - [phone] Fax - [phone]

## 2020-05-04 NOTE — ROUTINE PROCESS
Bedside and Verbal shift change report given to Jus Israel RN (oncoming nurse) by Venice Bernheim, RN (offgoing nurse). Report included the following information SBAR, Intake/Output, MAR and Recent Results.

## 2020-05-04 NOTE — ROUTINE PROCESS
Bedside shift change report given to Ivette Orozco RN (oncoming nurse) by Kevin Ortiz (offgoing nurse). Report included the following information SBAR, Kardex, Intake/Output, MAR, Accordion and Recent Results.

## 2020-05-04 NOTE — PROGRESS NOTES
Problem: Mobility Impaired (Adult and Pediatric) Goal: *Acute Goals and Plan of Care (Insert Text) Description: FUNCTIONAL STATUS PRIOR TO ADMISSION: Patient was modified independent for functional mobility and ADLs per patient, still drives. HOME SUPPORT PRIOR TO ADMISSION: The patient lived with family members but did not require assist. 
 
Physical Therapy Goals Initiated 5/1/2020 1. Patient will move from supine to sit and sit to supine , scoot up and down and roll side to side in bed with independence within 7 day(s). 2.  Patient will transfer from bed to chair and chair to bed with independence using the least restrictive device within 7 day(s). 3.  Patient will perform sit <> stand with independence within 7 day(s). 4.  Patient will ambulate with independence for 150 feet with the least restrictive device within 7 day(s). Note: PHYSICAL THERAPY TREATMENT Patient: Apolonia Hoang (1 y.o. male) Date: 5/4/2020 Diagnosis: Enteritis [K52.9] <principal problem not specified> Procedure(s) (LRB): 
SIGMOIDOSCOPY FLEXIBLE (N/A) COLON BIOPSY (N/A) 3 Days Post-Op Precautions: Contact, Fall Chart, physical therapy assessment, plan of care and goals were reviewed. ASSESSMENT Patient continues with skilled PT services. Pt supine to sit to stand with CGA assist.Pt ambulated 50ft in room with no device  CGA. Pt is slightly unsteady and fatigues rather quickly. Pt left sitting in chair. Pt progress slow. Continue goals. Current Level of Function Impacting Discharge (mobility/balance): Pt is CGA with cues for mobility. PLAN : 
Patient continues to benefit from skilled intervention to address the above impairments. Continue treatment per established plan of care. to address goals. Recommendation for discharge: (in order for the patient to meet his/her long term goals) Home health PT This discharge recommendation: Has been made in collaboration with the attending provider and/or case management IF patient discharges home will need the following DME: to be determined (TBD) SUBJECTIVE:  
 
 
OBJECTIVE DATA SUMMARY:  
Critical Behavior: 
Neurologic State: Alert, Confused Orientation Level: Oriented to person, Oriented to place, Disoriented to time, Disoriented to situation Cognition: Follows commands Safety/Judgement: Awareness of environment, Fall prevention, Insight into deficits, Decreased awareness of need for safety Functional Mobility Training: 
Bed Mobility: 
  
Supine to Sit: Contact guard assistance Transfers: 
Sit to Stand: Contact guard assistance Stand to Sit: Contact guard assistance Balance: 
Sitting - Static: Good (unsupported) Ambulation/Gait Training: 
Distance (ft): 50 Feet (ft) Assistive Device: Gait belt Ambulation - Level of Assistance: Contact guard assistance Activity Tolerance:  
Fair Please refer to the flowsheet for vital signs taken during this treatment. After treatment patient left in no apparent distress:  
Sitting in chair COMMUNICATION/COLLABORATION:  
The patients plan of care was discussed with: Physical therapist.  
 
Trell Mclean PTA Time Calculation: 23 mins

## 2020-05-04 NOTE — PROGRESS NOTES
PICC Placement Note PRE-PROCEDURE VERIFICATION Correct Procedure: yes Correct Site:  yes Temperature: Temp: 98 °F (36.7 °C), Temperature Source: Temp Source: Oral 
Recent Labs 05/04/20 
0033 05/03/20 
0013 BUN 21* 26* CREA 1.41* 1.56* PLT  --  168 WBC  --  12.3* Allergies: Patient has no known allergies. Education materials for PICC Care given: yes. See Patient Education activity for further details. PICC Booklet placed at bedside: yes Closed Ended PICC Catheters: 
Flush Lumens as Follows: 
Intermittent Medication:   Flush before and after each medication with 10 ml NS. Unused Ports:  Flush every 8 hours with 10 ml NS. 
TPN Ports:  Flush every 24 hours with 20 ml NS prior to hanging new bag. Blood Draws: Stop infusion, draw off and waste 10 ml of blood. Draw sample with 10cc syringe or greater. DO NOT USE VACUTAINER . Transfer with appropriate device to lab  tubes. Flush with 20 ml NS. Dressing Change:  Every 7 days, and PRN using sterile technique if integrity of dressing is compromised. Initial dressing change for central line 24-48 hours post insertion if gauze is used. Apply new dressing per policy. PROCEDURE DETAIL Consent was obtained and all questions were answered related to risks and benefits. A triple lumen PICC line was inserted, as a sterile procedure using ultrasound and modified Seldinger technique for TPN. The following documentation is in addition to the PICC properties in the lines/airways flowsheet : 
Lot #: [id number] Lidocaine 1% administered intradermally :yes Internal Catheter Total Length: 0 (cm) Vein Selection for PICC:right brachial 
Central Line Bundle followed yes Complication Related to Insertion:no The placement was verified by EKG, MAX P WAVE @ 43 (cm). PER EKG PICC TIP @ C/A junction. Line is okay to use: yes Jackelyn Lomeli RN

## 2020-05-04 NOTE — PROGRESS NOTES
Spiritual Care Assessment/Progress Note [address] 
 
 
NAME: Abhi Mendez      MRN: [id number] AGE: 67 y.o. SEX: male Episcopal Affiliation: No preference Language: [place] 5/4/2020     Total Time (in minutes): 29 Spiritual Assessment begun in [facility]  MED SURG 2 through conversation with: 
  
    [x]Patient        [] Family    [] Friend(s) Reason for Consult: Initial/Spiritual assessment, patient floor Spiritual beliefs: (Please include comment if needed) [x] Identifies with a emma tradition:     
   [] Supported by a emma community:        
   [] Claims no spiritual orientation:       
   [] Seeking spiritual identity:            
   [] Adheres to an individual form of spirituality:       
   [] Not able to assess:                   
 
    
Identified resources for coping:  
   [x] Prayer                           
   [] Music                  [] Guided Imagery 
   [] Family/friends                 [] Pet visits [] Devotional reading                         [] Unknown 
   [] Other:                                         
 
 
Interventions offered during this visit: (See comments for more details) Patient Interventions: Affirmation of emotions/emotional suffering, Affirmation of emma, Normalization of emotional/spiritual concerns, Guidance concerning next steps/process to be expected, Coping skills reviewed/reinforced, Catharsis/review of pertinent events in supportive environment, Initial/Spiritual assessment, patient floor, Episcopal beliefs/image of God discussed, Prayer (assurance of) Plan of Care: 
 
 [] Support spiritual and/or cultural needs  
 [] Support AMD and/or advance care planning process    
 [] Support grieving process 
 [] Coordinate Rites and/or Rituals  
 [] Coordination with community clergy [] No spiritual needs identified at this time 
 [] Detailed Plan of Care below (See Comments)  [] Make referral to Music Therapy [] Make referral to Pet Therapy    
[] Make referral to Addiction services 
[] Make referral to [facility] 
[] Make referral to Spiritual Care Partner 
[] No future visits requested       
[x] Follow up visits as needed Comments:   visited pt, Mr Mely Felix at the [facility] unit for palliative care initial spiritual assessment. Mely Felix, who appeared distressed, indicated at the start of the visit that he was in pain. He however become increasingly comfortable during the visit, expressing mixed emotions, smiling at times and wearing serious looks , depending on issues being discussed. He shared his life and family stories. Pt is currently  after two marriages. His marriage to his second wife was very memorable, she seem to understand him, he stated, and he still thinks about her every day. He stated that he has very limited social circles, and he is not very close to his three sons, though they get along pretty well. Mely Felix indicated also that until being hospitalized, he's been pretty healthy , active and independent. He also loves going to [employer], but not in any leadership role.  offered support through active listening and affirmation; self care and focus on the present encouraged; pt also assured of prayers. Mely Felix seemed more relaxed at the end of the visit, and he expressed appreciation for the visit. Spiritual care is still available as needed. Visited by: Eliza Morales. Rodney ding: [phone])

## 2020-05-04 NOTE — PROGRESS NOTES
[facility] Medical Progress Note NAME: Giovanny Mcneal :  1940 MRM:  [id number] Date/Time of service 2020  8:27 AM    
 
  
Assessment and Plan:  
 
Rectal mass / Large bowel obstruction - CT with SB and LB distention and fluid-filled loops. Rectal wall thickening concerning for cancer. Rectal carcinoma suspected by Flex Sig, Bx pending. GI, CRS and Oncology consulted. CRS planning diverting colectomy in AM, for palliative symptom control. Still NPO due to concern with development of obstruction. Low threshold to place NGT. Family of patient leaning towards no treatment and presumably hospice after that. Enteritis / Leukocytosis - Continue cefepime and Flagyl for now.  
  
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 T showed circumferential bladder wall thickening most likely related to chronic outlet obstruction. UA consistent with UTI, alpha strep on Ur Cx. Continue cefepime as above.  
  
Dementia - Mild. Supportive care. At risk for delirium 
  
HTN (hypertension) - continue Norvasc. IV labetalol PRN Elevated troponin - POA, strain, due to renal insufficiency. TTE normal.  Appreciate cardiology evaluation. No further workup warranted 
  
  
Subjective: Chief Complaint:  Weak, abd distended ROS: 
(bold if positive, if negative) Abd PainTolerating some PT  NPO Objective:  
 
Last 24hrs VS reviewed since prior progress note. Most recent are: 
 
Visit Vitals /86 (BP 1 Location: Right arm, BP Patient Position: At rest) Pulse 64 Temp 98.3 °F (36.8 °C) Resp 18 Ht 5' 8\" (1.727 m) Wt 86.1 kg (189 lb 13.1 oz) SpO2 99% BMI 28.86 kg/m² SpO2 Readings from Last 6 Encounters:  
20 99% Intake/Output Summary (Last 24 hours) at 2020 3204 Last data filed at 5/3/2020 7315 Gross per 24 hour Intake 60 ml Output 300 ml Net -240 ml Physical Exam: 
 
Gen:  Well-developed, well-nourished, in no acute distress HEENT:  Pink conjunctivae, PERRL, hearing intact to voice, moist mucous membranes Neck:  Supple, without masses, thyroid non-tender Resp:  No accessory muscle use, clear breath sounds without wheezes rales or rhonchi 
Card:  No murmurs, normal S1, S2 without thrills, bruits or peripheral edema Abd:  Soft, non-tender, distended, reduced bowel sounds are present, no mass Lymph:  No cervical or inguinal adenopathy Musc:  No cyanosis or clubbing Skin:  No rashes or ulcers, skin turgor is good Neuro:  Cranial nerves are grossly intact, general motor weakness, follows commands vaguely Psych:  Poor insight, oriented to person, place, sleepy Telemetry reviewed:   normal sinus rhythm 
__________________________________________________________________ Medications Reviewed: (see below) Medications:  
 
Current Facility-Administered Medications Medication Dose Route Frequency  TPN ADULT - CENTRAL AA 5% D20% W/ ELECTROLYTES AND CA   IntraVENous CONTINUOUS  
 labetaloL (NORMODYNE) tablet 100 mg  100 mg Oral Q12H  
 dextrose 5% - 0.45% NaCl with KCl 40 mEq/L infusion   IntraVENous CONTINUOUS  
 heparin (porcine) injection 5,000 Units  5,000 Units SubCUTAneous Q8H  
 metroNIDAZOLE (FLAGYL) IVPB premix 500 mg  500 mg IntraVENous Q12H  
 benzocaine (HURRICANE) 20 % spray   Mucous Membrane PRN  
 sodium chloride (NS) flush 5-40 mL  5-40 mL IntraVENous Q8H  
 sodium chloride (NS) flush 5-40 mL  5-40 mL IntraVENous PRN  
 acetaminophen (TYLENOL) tablet 650 mg  650 mg Oral Q4H PRN  
 morphine injection 1 mg  1 mg IntraVENous Q4H PRN  
 ondansetron (ZOFRAN) injection 4 mg  4 mg IntraVENous Q4H PRN  
 amLODIPine (NORVASC) tablet 10 mg  10 mg Oral DAILY  cefepime (MAXIPIME) 2 g in 0.9% sodium chloride (MBP/ADV) 100 mL  2 g IntraVENous Q12H  
 labetaloL (NORMODYNE;TRANDATE) 20 mg/4 mL (5 mg/mL) injection 10 mg  10 mg IntraVENous Q4H PRN  
 aspirin delayed-release tablet 81 mg  81 mg Oral DAILY  pantoprazole (PROTONIX) 40 mg in 0.9% sodium chloride 10 mL injection  40 mg IntraVENous DAILY  sucralfate (CARAFATE) tablet 1 g  1 g Oral AC&HS  ondansetron (ZOFRAN) injection 4 mg  4 mg IntraVENous Q1H PRN Lab Data Reviewed: (see below) Lab Review:  
 
Recent Labs 05/03/20 
0013 05/02/20 
7942 WBC 12.3* 13.5* HGB 13.1 13.6 HCT 40.7 41.7  170 Recent Labs 05/04/20 
7106 05/03/20 
0013 05/02/20 
0703  143 143  
K 4.2 3.9 3.3*  
* 117* 117* CO2 17* 20* 18* GLU 97 110* 101* BUN 21* 26* 31* CREA 1.41* 1.56* 1.46* CA 7.7* 8.1* 7.7* MG  --  1.7 1.8 PHOS  --  1.9* 2.3* ALB  --  2.5* 2.6* TBILI  --   --  0.8 SGOT  --   --  14* ALT  --   --  12 No results found for: [facility] No results for input(s): PH, PCO2, PO2, HCO3, FIO2 in the last 72 hours. No results for input(s): INR, INREXT, INREXT in the last 72 hours. All Micro Results Procedure Component Value Units Date/Time CULTURE, BLOOD [[id number]] Collected:  04/30/20 0140 Order Status:  Completed Specimen:  Blood Updated:  05/04/20 8489 Special Requests: NO SPECIAL REQUESTS Culture result: NO GROWTH 4 DAYS     
 CULTURE, URINE [[id number]]  (Abnormal) Collected:  04/30/20 1132 Order Status:  Completed Specimen:  Urine Updated:  05/03/20 1531 Special Requests: --     
  NO SPECIAL REQUESTS Reflexed from [id number] [place] Count --     
  >100,000 COLONIES/mL Culture result:    
  AEROCOCCUS URINAE A. URINAE HAS BEEN DESCRIBED AS SUSCEPTIBLE TO PENICILLIN, AMOXICILLIN, PIPERACILLIN, CEFIPIME, RIFAMPIN AND NITROFURANTOIN, BUT RESISTANT TO SULFONAMIDES. [address] [[id number]] Collected:  05/01/20 2230 Order Status:  Canceled Specimen:  Feces from Stool C. DIFFICILE AG & TOXIN A/B [[id number]] Order Status:  Canceled Specimen:  Stool ENTERIC BACTERIA PANEL, DNA [[id number]] Order Status:  Canceled Specimen:  Stool URINE CULTURE HOLD SAMPLE [[id number]] Order Status:  Canceled Specimen:  Urine Other pertinent lab: none Total time spent with patient: 39 Minutes I personally reviewed chart, notes, data and current medications in the medical record. I have personally examined and treated the patient at bedside during this period. Care Plan discussed with: Patient, Nursing Staff and >50% of time spent in counseling and coordination of care Discussed:  Care Plan Prophylaxis:  H2B/PPI Disposition:   PT, OT, RN 
        
___________________________________________________ Attending Physician: Ishan Ortez MD

## 2020-05-04 NOTE — PROGRESS NOTES
Jason Shabazz. Timothy Licea, [address] [place] [address] 
[phone] office 
[phone] [facility] Gastroenterology Progress Note May 4, 2020 Admit Date: 4/29/2020 Narrative Assessment and Plan · Abdominal distension · Presumed rectal carcinoma - pathology pending For diverting colostomy tomorrow. Awaiting path results. Subjective:  
· Confused Location:  No verbalized co 
Duration:  
Timing:  
Radiation:   
Associated Symptoms:  
Aggravating/Alleviating factors:  
 
ROS:  The previous review of systems on initial consultation / H&P is noted and reviewed. Specific changes noted above in HPI. Current Medications:  
 
Current Facility-Administered Medications Medication Dose Route Frequency  TPN ADULT - CENTRAL AA 5% D20% W/ ELECTROLYTES AND CA   IntraVENous CONTINUOUS  
 sodium bicarbonate tablet 650 mg  650 mg Oral BID  labetaloL (NORMODYNE) tablet 100 mg  100 mg Oral Q12H  
 dextrose 5% - 0.45% NaCl with KCl 40 mEq/L infusion   IntraVENous CONTINUOUS  
 heparin (porcine) injection 5,000 Units  5,000 Units SubCUTAneous Q8H  
 metroNIDAZOLE (FLAGYL) IVPB premix 500 mg  500 mg IntraVENous Q12H  
 benzocaine (HURRICANE) 20 % spray   Mucous Membrane PRN  
 sodium chloride (NS) flush 5-40 mL  5-40 mL IntraVENous Q8H  
 sodium chloride (NS) flush 5-40 mL  5-40 mL IntraVENous PRN  
 acetaminophen (TYLENOL) tablet 650 mg  650 mg Oral Q4H PRN  
 morphine injection 1 mg  1 mg IntraVENous Q4H PRN  
 ondansetron (ZOFRAN) injection 4 mg  4 mg IntraVENous Q4H PRN  
 amLODIPine (NORVASC) tablet 10 mg  10 mg Oral DAILY  cefepime (MAXIPIME) 2 g in 0.9% sodium chloride (MBP/ADV) 100 mL  2 g IntraVENous Q12H  
 labetaloL (NORMODYNE;TRANDATE) 20 mg/4 mL (5 mg/mL) injection 10 mg  10 mg IntraVENous Q4H PRN  
 aspirin delayed-release tablet 81 mg  81 mg Oral DAILY  pantoprazole (PROTONIX) 40 mg in 0.9% sodium chloride 10 mL injection  40 mg IntraVENous DAILY  sucralfate (CARAFATE) tablet 1 g  1 g Oral AC&HS  ondansetron (ZOFRAN) injection 4 mg  4 mg IntraVENous Q1H PRN Objective: VITALS:  
Last 24hrs VS reviewed since prior progress note. Most recent are: 
Visit Vitals /89 (BP 1 Location: Right arm, BP Patient Position: At rest) Pulse 64 Temp 98 °F (36.7 °C) Resp 16 Ht 5' 8\" (1.727 m) Wt 86.1 kg (189 lb 13.1 oz) SpO2 98% BMI 28.86 kg/m² Temp (24hrs), Av °F (36.7 °C), Min:97.7 °F (36.5 °C), Max:98.4 °F (36.9 °C) Intake/Output Summary (Last 24 hours) at 2020 1527 Last data filed at 5/3/2020 4120 Gross per 24 hour Intake 30 ml Output  Net 30 ml EXAM: 
General:  Comfortable, no distress   
HEENT:  Atraumatic skull, pupils equal 
Lungs:   No abnormal audible breath sounds. Speaking in complete sentences Heart:   No abnormal audible heart sounds. Well perfused Abdomen: distended. No guarding or rebound Musc:   No skeletal defects or deformities Neurologic:   Cranial nerves grossly intact, moves all 4 extremities Psych:    Good insight. Not anxious nor agitated Derm:   No rash, jaundice, nor palpable dermatologic mass Lab Data Reviewed:  
Recent Labs 20 
0013 20 
6994 WBC 12.3* 13.5* HGB 13.1 13.6 HCT 40.7 41.7  170 Recent Labs 20 
6825 20 
0013 20 
9786  143 143  
K 4.2 3.9 3.3*  
* 117* 117* CO2 17* 20* 18* GLU 97 110* 101* BUN 21* 26* 31* CREA 1.41* 1.56* 1.46* CA 7.7* 8.1* 7.7* MG  --  1.7 1.8 PHOS  --  1.9* 2.3* ALB  --  2.5* 2.6* TBILI  --   --  0.8 SGOT  --   --  14* ALT  --   --  12 No results found for: [facility] No results for input(s): PH, PCO2, PO2, HCO3, FIO2 in the last 72 hours. No results for input(s): INR, INREXT in the last 72 hours. Assessment: (See above) Active Problems: 
  Enteritis (2020) Renal insufficiency (2020) Dementia ([facility] [address] (2020) Elevated troponin (4/30/2020) Leukocytosis (4/30/2020) HTN (hypertension) (4/30/2020) Plan: (See above) Signed By: Fidel Williamson MD   
 5/4/2020  3:27 PM

## 2020-05-04 NOTE — PROGRESS NOTES
5/4/2020   CARE MANAGEMENT NOTE:  Pt transferred from ICU to the 5th floor. EMR reviewed and handoff received from previous . Pt was admitted with enteritis. Reportedly, pt resides with his dtr, Isabelle Headley ([phone]).   
RUR 16% 
  
Transition Plan of Care: 1. Surgery planned on May 5 for open diverting colostomy 2. PICC ordered for TPN 
  
CM will continue to follow pt for definitive dispo TON Gomez

## 2020-05-04 NOTE — PROGRESS NOTES
Nutrition Assessment: 
 
RECOMMENDATIONS/INTERVENTION(S):  
1. Initiate TPN, recommend: 
 
Day 1: TPN (D20/AA5) @ 42 mL/hr 
Day 2: TPN (D20/AA5) @ 63 mL/hr 
Day 3: TPN (D20/AA5) @ 75 mL/hr Lipids - 20% @ 42 mL/hr x 12 hrs, 3 times per week Goal provides 2016 kcal and 90 g Pro - meeting 100% of kcal and protein needs. Check triglycerides prior to first lipid administration. Check triglycerides weekly while on lipids. 2. Continue to monitor for diet advancement, wt changes, labs, GI, plan of care. ASSESSMENT:  
5/4: RD consult received for TPN recommendations. Pt admitted with enteritis. PMH includes HTN, CKD, dementia. BMI 28.9, c/w overweight. PT currently NPO. Per MD note, pt with rectal mass, awaiting bx. CRS planning for diverting colectomy tomorrow morning (5/5). Per CRS, pt has had little to no po intake x2 weeks and anticipate several days before being able to take in any PO. Palliative following, family/pt waiting for pathology before making a final decision on goals of care. No recent weight hx in chart. TPN recs provided. Phos low, recommend repletion. Will monitor need for TPN adjustments. Labs- phos 1.9, Ca 7.7. Meds- cefepime, flagyl, Zofran, Protonix, [email or web address], TPN. Diet Order: NPO 
% Eaten:   
Patient Vitals for the past 72 hrs: 
 % Diet Eaten 05/03/20 1200 0 % 05/03/20 0900 0 % 05/02/20 1706 0 % Pertinent Medications: [x] Reviewed Labs: [x] Reviewed Anthropometrics: Height: 5' 8\" (172.7 cm) Weight: 86.1 kg (189 lb 13.1 oz) IBW (%IBW):   ( ) UBW (%UBW):   (  %) BMI: Body mass index is 28.86 kg/m². This BMI is indicative of: 
 [] Underweight    [] Normal    [x] Overweight    []  Obesity    []  Extreme Obesity (BMI>40) Estimated Nutrition Needs (Based on): 2024 Kcals/day(1557 x 1.3 AF) , 86 g(1-1.2) Protein Carbohydrate: At Least 130 g/day  Fluids: 2024 mL/day (1 ml/kcal) Last BM: 5/2   []Active     []Hyperactive  [x]Hypoactive       [] Absent   BS 
 Skin:    [x] Intact   [] Incision  [] Breakdown   [] DTI   [] Tears/Excoriation/Abrasion  []Edema [] Other: Wt Readings from Last 30 Encounters:  
04/30/20 86.1 kg (189 lb 13.1 oz) NUTRITION DIAGNOSES:  
Problem:  Altered GI function Etiology: related to alteration in GI structure/function Signs/Symptoms: as evidenced by rectal mass/large bowel obstruction per MD, pt NPO, need for TPN   
 
NUTRITION INTERVENTIONS: 
Initiate parenteral nutrition GOAL:  
Initiation of TPN for bowel rest meeting estimated energy/protein needs next 1-3 days Cultural, Orthodoxy, or Ethnic Dietary Needs: None EDUCATION & DISCHARGE NEEDS:  
 [x] None Identified 
 [] Identified and Education Provided/Documented 
 [] Identified and Pt declined/was not appropriate 
  
 [] Interdisciplinary Care Plan Reviewed/Documented  
 [x] Discharge Needs: TBD- on TPN, goals of care unknown (awaiting bx) 
 [] No Nutrition Related Discharge Needs NUTRITION RISK:  
Pt Is At Nutrition Risk  [x] No Nutrition Risk Identified  [] PT SEEN FOR:  
 [x]  MD Consult: []Calorie Count []Diabetic Diet Education []Diet Education []Electrolyte Management []General Nutrition Management and Supplements []Management of Tube Feeding 
   [x]TPN Recommendations []  RN Referral:  []MST score >=2 
   []Enteral/Parenteral Nutrition PTA []Pregnant: Gestational DM or Multigestation  
              [] Pressure Ulcer 
 
[]  Low BMI      []  Length of Stay       [] Dysphagia Diet         [] Ventilator 
[]  Follow-up Previous Recommendations: 
 [] Implemented          [] Not Implemented          [x] Not Applicable Previous Goal: 
 [] Met              [] Progressing Towards Goal              [] Not Progressing Towards Goal   [x] Not Applicable Kalyan Horvath RDN Pager [phone] Phone [phone]

## 2020-05-04 NOTE — PROGRESS NOTES
[facility]  Medical Oncology at [address] [place] [address] [phone] Hematology / Oncology Follow up Reason for Visit:  
Chandler Silva is a 67 y.o. male who is seen in consultation at the request of Dr. Sonya Pack for evaluation of probable rectal cancer. History of Present Illness:  
Chandler Silva is a 67 y.o. male with mild dementia admitted with n/v, found to have rectal mass. He was admitted 4/30/20 after 10 days of n/v/d refractory to trial of antiemetic at home. Imaging showed small and large bowel distention as well as possible enteritis. Admission labs notable for WBC 17, Cr 2.24. Blood cx negative, but urine culture growing apparent alpha streptococcus. Pt currently on Cefepime and Flagyl. Pt evaluated by Dr. Jae Car in GI, found to have firm nodular mass in rectum. Per patient's step daughter, patient has been weak for the past 2 weeks. However, prior to that he has been active with dressing himself, taking walks in the neighborhood. Pt did have prior colonoscopy approx 10 yrs ago at [facility] 57 thinks her mother might have mentioned that pt had colon cancer in the past, but she is unsure. In further speaking with step-daughter, she states she is patient's POA. She states his short-term memory has been quite poor and often cannot remember whether he ate that day or not. She does not feel that he would want aggressive treatment measures. Interval History:  
 
Confused; does not remember why he is at the hospital; wants to eat. Does not remember discussion about upcoming surgery. Unsure why has not heard from his step daughter, Cher Bertrand. C/o of abd area feeling full. Discussed with nursing; will reach out to step daughter to speak with pt for reassurance. History reviewed. No pertinent past medical history. Past Surgical History:  
Procedure Laterality Date 2021 Dee Becerra N/A 5/1/2020 SIGMOIDOSCOPY FLEXIBLE performed by Elizabeth Cantor MD at [address] [place] [address] Social History Tobacco Use  Smoking status: Former Smoker  Smokeless tobacco: Never Used Substance Use Topics  Alcohol use: Not on file History reviewed. No pertinent family history. Current Facility-Administered Medications Medication Dose Route Frequency  TPN ADULT - CENTRAL AA 5% D20% W/ ELECTROLYTES AND CA   IntraVENous CONTINUOUS  
 sodium bicarbonate tablet 650 mg  650 mg Oral BID  labetaloL (NORMODYNE) tablet 100 mg  100 mg Oral Q12H  
 dextrose 5% - 0.45% NaCl with KCl 40 mEq/L infusion   IntraVENous CONTINUOUS  
 heparin (porcine) injection 5,000 Units  5,000 Units SubCUTAneous Q8H  
 metroNIDAZOLE (FLAGYL) IVPB premix 500 mg  500 mg IntraVENous Q12H  
 benzocaine (HURRICANE) 20 % spray   Mucous Membrane PRN  
 sodium chloride (NS) flush 5-40 mL  5-40 mL IntraVENous Q8H  
 sodium chloride (NS) flush 5-40 mL  5-40 mL IntraVENous PRN  
 acetaminophen (TYLENOL) tablet 650 mg  650 mg Oral Q4H PRN  
 morphine injection 1 mg  1 mg IntraVENous Q4H PRN  
 ondansetron (ZOFRAN) injection 4 mg  4 mg IntraVENous Q4H PRN  
 amLODIPine (NORVASC) tablet 10 mg  10 mg Oral DAILY  cefepime (MAXIPIME) 2 g in 0.9% sodium chloride (MBP/ADV) 100 mL  2 g IntraVENous Q12H  
 labetaloL (NORMODYNE;TRANDATE) 20 mg/4 mL (5 mg/mL) injection 10 mg  10 mg IntraVENous Q4H PRN  
 aspirin delayed-release tablet 81 mg  81 mg Oral DAILY  pantoprazole (PROTONIX) 40 mg in 0.9% sodium chloride 10 mL injection  40 mg IntraVENous DAILY  sucralfate (CARAFATE) tablet 1 g  1 g Oral AC&HS  ondansetron (ZOFRAN) injection 4 mg  4 mg IntraVENous Q1H PRN No Known Allergies Review of Systems: A complete review of systems was obtained, negative except as described above. Physical Exam:  
 
Visit Vitals /89 (BP 1 Location: Right arm, BP Patient Position: At rest) Pulse 64 Temp 98 °F (36.7 °C) Resp 16 Ht 5' 8\" (1.727 m) Wt 86.1 kg (189 lb 13.1 oz) SpO2 98% BMI 28.86 kg/m² ECOG PS: 2 General: No distress Eyes: PERRLA, anicteric sclerae HENT: Atraumatic with normal appearance of ears and nose; OP clear Neck: Supple; no thyromegaly Respiratory: anteriorly CTAB, normal respiratory effort CV: Normal rate, regular rhythm, no murmurs, no peripheral edema GI: Soft but mildly distended, nontender, no hepatomegaly, no splenomegaly MS: Normal gait and station. Digits without clubbing or cyanosis. Skin: No rashes, ecchymoses, or petechiae. Normal temperature, turgor, and texture. Neuro/Psych: Alert, oriented to person, place, year but not month. Appropriate affect, questionable memory,  Poor judgment/insight Results:  
 
Lab Results Component Value Date/Time WBC 12.3 (H) 2020 12:13 AM  
 HGB 13.1 2020 12:13 AM  
 HCT 40.7 2020 12:13 AM  
 PLATELET 368  12:13 AM  
 MCV 93.6 2020 12:13 AM  
 ABS. NEUTROPHILS 10.1 (H) 2020 12:13 AM  
 
Lab Results Component Value Date/Time Sodium 143 2020 12:33 AM  
 Potassium 4.2 2020 12:33 AM  
 Chloride 118 (H) 2020 12:33 AM  
 CO2 17 (L) 2020 12:33 AM  
 Glucose 97 2020 12:33 AM  
 BUN 21 (H) 2020 12:33 AM  
 Creatinine 1.41 (H) 2020 12:33 AM  
 GFR est AA 59 (L) 2020 12:33 AM  
 GFR est non-AA 49 (L) 2020 12:33 AM  
 Calcium 7.7 (L) 2020 12:33 AM  
 
Lab Results Component Value Date/Time Bilirubin, total 0.8 2020 03:59 AM  
 ALT (SGPT) 12 2020 03:59 AM  
 AST (SGOT) 14 (L) 2020 03:59 AM  
 Alk. phosphatase 56 2020 03:59 AM  
 Protein, total 5.9 (L) 2020 03:59 AM  
 Albumin 2.5 (L) 2020 12:13 AM  
 Globulin 3.3 2020 03:59 AM  
 
 
Imagin/29/20 KUB: IMPRESSION: Gaseous small and large bowel distention. 30 abd/pelvis CT:   
IMPRESSION: 
 Extensive small and large bowel distention. Fluid-filled loops of small and 
large bowel. The absence of IV contrast limits evaluation, consider severe 
enteritis. Distal circumferential rectal wall thickening may be related to 
infectious process, neoplastic etiology is not excluded. Cholelithiasis. Circumferential bladder wall thickening most likely related to chronic outlet 
obstruction. Neoplastic etiologies deemed less likely. Additional incidental findings are as described above. 5/2/20 KUB: IMPRESSION: Continued large and small bowel distention. Procedures: 
 
Flex sig 5/1/20: Findings: This was an enema prep as he would not tolerate PO prep due to his gastroenteritis. The rectum is cleansed but there is generous stool in the sigmoid. 
Samson Donovan is a malignant appearing fungating friable circumferental mass from the dentate up to 10cm. We were able to just get the scope to navigate with mild resistance, I estimate the lumen to be 11-12mm. Above 10cm there's too much stool to safely navigate. Cold forceps biopsies obtained for histology. Impression: The endoscopic impression is that of rectal carcinoma. Biopsies pending. He and daughter report previous colonoscopy and GI evaluation having been done years ago at [facility] but I have no access to those records.  
  
Recommendations: - Await pathology. - CT has been done, get CEA, get colorectal surgery consult. Assessment and Recommendations:  
Maria Luisa Clayton is a 67 y.o. male 1. Rectal mass: Stage unclear at this time and need to rule out distant metastases with imaging using IV contrast when feasible. Discussed with patient's step-daughter that standard of care if no distant metastatic disease would be surgery or neoadjuvant chemoradiation or definitive chemoradiation.  She feels that due to patient's dementia, it would be very difficult to care for him and she does not feel he would want aggressive measures of treatment. She is more interested in palliative options. Dr. Gwendolyn Biswas from colorectal surgery recommend diverting colostomy for decompression, followed by chemoradiation. CEA 2.9. 
-- f/u biopsy results -- Chest CT with IV contrast to complete staging 
-- Will also need abd/pelvis CT with IV contrast OR abdomen/pelvis MRI once Cr improved 
-- Colon/rectal surgery/ Dr Issac Burch 5/5 planning for diverting colostomy; with PICC placement and TPN 2. VERONIQUE: 2/2 n/v and initially improved with IVF hydration - but Cr slight improvement at  1.41 today. 3. Alpha streptococcus UTI: On Cefepime, Flagyl 4. Dementia: Poor short term memory 5. Goals of care Palliative team following Plan reviewed with Dr Chandler Jules Signed By: Hubert Ley NP May 4, 2020

## 2020-05-04 NOTE — PROGRESS NOTES
Problem: Self Care Deficits Care Plan (Adult) Goal: *Acute Goals and Plan of Care (Insert Text) Description:  
FUNCTIONAL STATUS PRIOR TO ADMISSION: Pt poor historian; however, reports living with daughter in law, residing on first floor of two story house, with no MARILYN, stating he was Independent with ADLs without use of DME, standing to shower in walk-in shower. Reports he enjoys listening to all music but the \"hillbilly\" kind. HOME SUPPORT: The patient lived with daughter in law who provides PRN ADL/IADL assist. 
 
Occupational Therapy Goals Initiated 4/30/2020.   
1.  Patient will perform standing grooming with supervision within 7 day(s)- MET 5/1/20 and upgrade to mod I. 
2.  Patient will perform lower body dressing with supervision within 7 day(s)- MET 5/1/20 and upgrade to mod I. 
3.  Patient will perform bathing with supervision within 7 day(s)- MET 5/1/20 and upgrade to mod I. 
4.  Patient will perform toilet transfers with supervision within 7 day(s)- MET 5/1/20 and upgrade to mod I. 
5.  Patient will perform all aspects of toileting with supervision within 7 day(s)- MET 5/1/20 and upgrade to mod I. 
6.  Patient will participate in upper extremity therapeutic exercise/activities with supervision for 10 minutes within 7 day(s). 7.  Patient will utilize energy conservation techniques during functional activities with Min verbal cues within 7 day(s). Outcome: Progressing Towards Goal 
 
OCCUPATIONAL THERAPY TREATMENT Patient: Giovanny Mcneal (24 y.o. male) Date: 5/4/2020 Diagnosis: Enteritis [K52.9] <principal problem not specified> Procedure(s) (LRB): 
SIGMOIDOSCOPY FLEXIBLE (N/A) COLON BIOPSY (N/A) 3 Days Post-Op Precautions: Contact, Fall Chart, occupational therapy assessment, plan of care, and goals were reviewed. ASSESSMENT Patient continues with skilled OT services and is progressing towards goals.   Patient with complaint of abdominal pain and discomfort/feeling of fullness. Declined out of bed at this time. Instructed on positioning, benefit of exercise. Unaware of sx tomorrow. Current Level of Function Impacting Discharge (ADLs): not assessed this date, activity tolerance limited by pain Other factors to consider for discharge: PLAN : 
Patient continues to benefit from skilled intervention to address the above impairments. Continue treatment per established plan of care. to address goals. Recommend with staff: out of bed as tolerated. Recommend next OT session:  planning for diverting colostomy 5/5 with PICC placement and TPN Recommendation for discharge: (in order for the patient to meet his/her long term goals) To be determined:   pending POC after sx This discharge recommendation: 
Has not yet been discussed the attending provider and/or case management IF patient discharges home will need the following DME: none SUBJECTIVE:  
Patient stated Percell Poe keep talking about a sx.  OBJECTIVE DATA SUMMARY:  
Cognitive/Behavioral Status: 
Neurologic State: Alert;Confused Orientation Level: Oriented to person;Oriented to place; Disoriented to time;Disoriented to situation Cognition: Follows commands Functional Mobility and Transfers for ADLs: 
Bed Mobility: 
Rolling: Contact guard assistance Supine to Sit: Contact guard assistance Scooting: Supervision(instructed on position of bed) Transfers: 
Sit to Stand: Contact guard assistance Balance: 
Sitting - Static: Good (unsupported) ADL Intervention: 
  
Instructed on positioning of bed and LE's in bridge to push to head of bed. Patient at foot of bed without pillow on arrival, repositioned, elevated LE's and provided pillow at head, verbalized increased comfort. Instructed in benefit of sidelying and movement to increase comfort. Re-ed on benefit of out of bed throughout day Pain: Not rated, abdominal 
 
Activity Tolerance:  
Poor Please refer to the flowsheet for vital signs taken during this treatment. After treatment patient left in no apparent distress:  
Supine in bed, Heels elevated for pressure relief, Call bell within reach, and Side rails x 3 
 
COMMUNICATION/COLLABORATION:  
The patients plan of care was discussed with: Registered nurse. Mile Tiwari OTR/L Time Calculation: 11 mins

## 2020-05-04 NOTE — PROGRESS NOTES
CRS 
Pt without complaints Flowsheet reviewed Abd: soft, dt, nt, no rebound, no guarding Plan: 
D/w pt. D/w pts daughter Melissa Caputo, via phone. Pt with a near obstructing rectal cancer. Colon significantly dilated with reflux back into small bowel. High risk to perforate. Recommend open diverting colostomy tomorrow (tues, May 5). Melissa Caputo agrees to proceed. I have reviewed the risks, benefits, alternatives with pt (and daughter). Understand and agree to proceed. All questions answered. No bowel prep as pt is near completely obstructed. hibiclens wash tonite. Little to no PO for past 2 weeks. Anticipate several days before being able to take in any PO.  Will ask for PICC placement and will start tpn

## 2020-05-04 NOTE — WOUND CARE
Ostomy nurse consult to teresa for potential ostomy - scheduled for tentative surgery 5/5 for open diverting colostomy Pt will need a diverting colostomy for decompression and subsequent chemoradiation for the rectal lesion Staff nurse at bedside- patient oriented to person and place- cannot verbalize reason for hospitalization or need for surgery. MD spoke with Step daughter this am.  
Abdomen large round and distended- no pain or tenderness. Stoma marking:  Introduced patient to self and services. e questions. Marked abdomen in the left lower abdominal quadrant, away from creases, folds, and umbilicus; within visual field and rectus muscle; marked with an indelible marker- covered with tegaderm dressing and dated. Will follow up post op and communicate with family as needed for education and assistance with ostomy care.  
 
[address]

## 2020-05-04 NOTE — PROGRESS NOTES
VAT Note - to acknowledge order for PICC placement for TPN. Chart reviewed for PICC placement evaluation. Areas reviewed include, but are not limited to, patient's current and past H&P, Lab and Procedure Results, all notes, media records and medications. Plan to place PICC today. Will obtain consent via telephone from daughter due to notes indicating pt may be disoriented to place/situation. Please call [phone] for questions or concerns.

## 2020-05-05 NOTE — OP NOTES
Trey Kincaid [facility] [place] 80 
OPERATIVE REPORT Name:  Alida Kwong 
MR#:  [id number] :  1940 ACCOUNT #:  [de-identified] DATE OF SERVICE:  2020 PREOPERATIVE DIAGNOSIS:  Colon mass for diverting colostomy and inability to place Colmenares catheter. POSTOPERATIVE DIAGNOSES:  Colon mass for diverting colostomy and inability to place Colmenares catheter with marked urethral stricture disease, bladder outlet obstruction, and urethral false passage. PROCEDURES PERFORMED:  Cystoscopy, urethral dilation, and placement of Colmenares catheter difficult secondary to diffuse stricture disease in extended time. SURGEON:  Cristian Patel MD 
 
ASSISTANT:  None. ANESTHESIA:  General. 
 
COMPLICATIONS:  None. SPECIMENS REMOVED:  None. IMPLANTS:  None. ESTIMATED BLOOD LOSS:  100 mL. CLINICAL INDICATION:  The patient is a 71-year-old gentleman with a known history of large rectal mass and bowel obstruction. He is scheduled for diverting colostomy and an intraoperative Colmenares catheter was attempted to be placed and this was unsuccessful by the OR staff. We were asked to see him intraoperatively for this Colmenares catheter placement. PROCEDURE:  The patient was under general anesthesia. His penis and scrotum were prepped and draped. I passed a flexible cystoscope into his urethra and he definitely had evidence of significant urethral stricture disease and a false passage in his mid penile urethra. I was able to see a small lip of mucosa laterally in his urethra and cannulated that with a wire and this was directed into his bladder. I then used the [facility] dilators to dilate this stricture. It was difficult as these dilators did not pass easily and I looked again with the cystoscope adjacent to the wire and was able to manipulate it through this scarred urethra into his bladder.   After I was in his bladder, I passed a super stiff wire through the cystoscope and removed the Bentson wire. I did inspect his bladder with a cystoscope with the lumen of his bladder and I did not see any obvious mucosal abnormalities. His prostate was obstructing but most of this obstruction was diffuse stricture disease. After the super stiff wire was placed, I used the Chikis dilators to dilate his urethra to 18-Emirati and then I was able to pass an 18-Emirati coudé catheter over the super stiff wire into his bladder. After the catheter was placed in his bladder, I did inflate the balloon and seated the catheter in his bladder neck. The wires were all removed and this was draining clear urine and appeared to be in a good position. He tolerated this well. There were no intraoperative complications. No blood loss. No specimens sent and implant was Colmenares catheter. This did take in extended time secondary to difficulty with access and dilation. MD KAN Rushing/[id number] 
D:  05/05/2020 10:04 
T:  05/05/2020 14:55 JOB #:  [id number] CC:  MD Fara Mccann MD

## 2020-05-05 NOTE — PROGRESS NOTES
[facility] Medical Progress Note NAME: Apolonia Hoang :  1940 MRM:  [id number] Date/Time of service 2020  8:27 AM    
 
  
Assessment and Plan:  
 
Rectal mass / Large bowel obstruction - CT with SB and LB distention and fluid-filled loops. Rectal wall thickening concerning for cancer. Rectal carcinoma suspected by Flex Sig, Bx pending. GI, CRS and Oncology consulted. CRS did diverting colectomy this AM, for palliative symptom control. Still NPO due to concern with development of obstruction. Low threshold to place NGT. Family of patient leaning towards no treatment and presumably hospice after that. Enteritis / Leukocytosis - Continue cefepime and Flagyl for now.  
  
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 T showed circumferential bladder wall thickening most likely related to chronic outlet obstruction. UA consistent with UTI, alpha strep on Ur Cx. Continue cefepime as above.  
  
Dementia - Mild. Supportive care. At risk for delirium 
  
HTN (hypertension) - continue Norvasc. IV labetalol PRN Elevated troponin - POA, strain, due to renal insufficiency. TTE normal.  Appreciate cardiology evaluation. No further workup warranted 
  
  
Subjective: Chief Complaint:  Tolerated surgery. ROS: 
(bold if positive, if negative) Abd PainNot Tolerating PT  NPO Objective:  
 
Last 24hrs VS reviewed since prior progress note. Most recent are: 
 
Visit Vitals /90 Pulse 79 Temp 97.7 °F (36.5 °C) Resp 21 Ht 5' 8\" (1.727 m) Wt 92.8 kg (204 lb 8 oz) SpO2 99% BMI 31.09 kg/m² SpO2 Readings from Last 6 Encounters:  
20 99% Intake/Output Summary (Last 24 hours) at 2020 1333 Last data filed at 2020 1310 Gross per 24 hour Intake 1730 ml Output 1200 ml Net 530 ml Physical Exam: 
 
Gen:  Frail, in no acute distress HEENT:  Pink conjunctivae, PERRL, hearing intact to voice, moist mucous membranes Neck:  Supple, without masses, thyroid non-tender Resp:  No accessory muscle use, clear breath sounds without wheezes rales or rhonchi 
Card:  No murmurs, normal S1, S2 without thrills, bruits or peripheral edema Abd:  Soft, non-tender, distended, reduced bowel sounds are present, no mass Lymph:  No cervical or inguinal adenopathy Musc:  No cyanosis or clubbing Skin:  No rashes or ulcers, skin turgor is good Neuro:  Cranial nerves are grossly intact, general motor weakness, follows commands vaguely Psych:  Poor insight, oriented to person, place, sleepy Telemetry reviewed:   normal sinus rhythm 
__________________________________________________________________ Medications Reviewed: (see below) Medications:  
 
Current Facility-Administered Medications Medication Dose Route Frequency  lactated Ringers infusion  100 mL/hr IntraVENous CONTINUOUS  
 HYDROmorphone (DILAUDID) syringe 0.25-1 mg  0.25-1 mg IntraVENous Q10MIN PRN  
 dextrose 5% - 0.45% NaCl with KCl 40 mEq/L infusion   IntraVENous CONTINUOUS  
 TPN ADULT - CENTRAL AA 5% D20% W/ ELECTROLYTES AND CA   IntraVENous CONTINUOUS  
 sodium bicarbonate tablet 650 mg  650 mg Oral BID  alteplase (CATHFLO) 1 mg in sterile water (preservative free) 1 mL injection  1 mg InterCATHeter PRN  
 labetaloL (NORMODYNE) tablet 100 mg  100 mg Oral Q12H  
 heparin (porcine) injection 5,000 Units  5,000 Units SubCUTAneous Q8H  
 metroNIDAZOLE (FLAGYL) IVPB premix 500 mg  500 mg IntraVENous Q12H  
 benzocaine (HURRICANE) 20 % spray   Mucous Membrane PRN  
 sodium chloride (NS) flush 5-40 mL  5-40 mL IntraVENous Q8H  
 sodium chloride (NS) flush 5-40 mL  5-40 mL IntraVENous PRN  
 acetaminophen (TYLENOL) tablet 650 mg  650 mg Oral Q4H PRN  
 morphine injection 1 mg  1 mg IntraVENous Q4H PRN  
 ondansetron (ZOFRAN) injection 4 mg  4 mg IntraVENous Q4H PRN  
 amLODIPine (NORVASC) tablet 10 mg  10 mg Oral DAILY  cefepime (MAXIPIME) 2 g in 0.9% sodium chloride (MBP/ADV) 100 mL  2 g IntraVENous Q12H  
 labetaloL (NORMODYNE;TRANDATE) 20 mg/4 mL (5 mg/mL) injection 10 mg  10 mg IntraVENous Q4H PRN  
 aspirin delayed-release tablet 81 mg  81 mg Oral DAILY  pantoprazole (PROTONIX) 40 mg in 0.9% sodium chloride 10 mL injection  40 mg IntraVENous DAILY  sucralfate (CARAFATE) tablet 1 g  1 g Oral AC&HS  ondansetron (ZOFRAN) injection 4 mg  4 mg IntraVENous Q1H PRN Lab Data Reviewed: (see below) Lab Review:  
 
Recent Labs 05/05/20 
0236 05/03/20 
0013 WBC 11.4* 12.3* HGB 12.7 13.1 HCT 39.1 40.7  168 Recent Labs 05/05/20 0236 05/04/20 
0033 05/03/20 
0013  143 143  
K 5.1 4.2 3.9 * 118* 117* CO2 17* 17* 20* * 97 110* BUN 20 21* 26* CREA 1.33* 1.41* 1.56* CA 8.0* 7.7* 8.1*  
MG 1.6  --  1.7 PHOS 1.7*  --  1.9* ALB 2.5*  --  2.5* TBILI 0.6  --   --   
SGOT 18  --   --   
ALT 18  --   -- No results found for: [facility] No results for input(s): PH, PCO2, PO2, HCO3, FIO2 in the last 72 hours. No results for input(s): INR, INREXT, INREXT in the last 72 hours. All Micro Results Procedure Component Value Units Date/Time CULTURE, BLOOD [[id number]] Collected:  04/30/20 0140 Order Status:  Completed Specimen:  Blood Updated:  05/05/20 4894 Special Requests: NO SPECIAL REQUESTS Culture result: NO GROWTH 5 DAYS     
 CULTURE, URINE [[id number]]  (Abnormal) Collected:  04/30/20 1132 Order Status:  Completed Specimen:  Urine Updated:  05/03/20 1531 Special Requests: --     
  NO SPECIAL REQUESTS Reflexed from [id number] [place] Count --     
  >100,000 COLONIES/mL Culture result:    
  AEROCOCCUS URINAE A. URINAE HAS BEEN DESCRIBED AS SUSCEPTIBLE TO PENICILLIN, AMOXICILLIN, PIPERACILLIN, CEFIPIME, RIFAMPIN AND NITROFURANTOIN, BUT RESISTANT TO SULFONAMIDES. [address] [[id number]] Collected:  05/01/20 2230 Order Status:  Canceled Specimen:  Feces from Stool C. DIFFICILE AG & TOXIN A/B [[id number]] Order Status:  Canceled Specimen:  Stool ENTERIC BACTERIA PANEL, DNA [[id number]] Order Status:  Canceled Specimen:  Stool URINE CULTURE HOLD SAMPLE [[id number]] Order Status:  Canceled Specimen:  Urine Other pertinent lab: none Total time spent with patient: 39 Minutes I personally reviewed chart, notes, data and current medications in the medical record. I have personally examined and treated the patient at bedside during this period. Care Plan discussed with: Patient, Nursing Staff and >50% of time spent in counseling and coordination of care Discussed:  Care Plan Prophylaxis:  H2B/PPI Disposition:   PT, OT, RN 
        
___________________________________________________ Attending Physician: Glenys Jones MD

## 2020-05-05 NOTE — OP NOTES
Trey Kincaid [facility] 
OPERATIVE REPORT Name:  Victorina Matute 
MR#:  [id number] :  1940 ACCOUNT #:  [de-identified] DATE OF SERVICE:  2020 PREOPERATIVE DIAGNOSIS:  Colonic obstruction secondary to large rectal mass. POSTOPERATIVE DIAGNOSES: 
1. Colonic obstruction secondary to large rectal mass. 2.  Extensive intraabdominal adhesions. PROCEDURE PERFORMED: 
1. Laparotomy, exploratory. 2.  Lysis of adhesions greater than an hour and a half. 3.  Sigmoid colostomy with distal sigmoid colon mucous fistula. SURGEON:  Zehra Xiao MD 
 
ASSISTANT:  Mimi Gallegos ANESTHESIA:  GET. COMPLICATIONS:  None. SPECIMENS REMOVED:  None. IMPLANTS:  None. ESTIMATED BLOOD LOSS:  100 mL URINE OUTPUT:  Please see Anesthesia record. FLUIDS:  Please see Anesthesia record. INDICATIONS:  A very pleasant 76-year-old male who was admitted for an obstructive mass in his rectum. This was near obstructing. As he has had what looks like it was a previous ileocecectomy in the past, he was refluxing stool back into his small bowel. Therefore, he was taken emergently to the OR today for decompression. FINDINGS:  Extensive intraabdominal adhesions. This made it very difficult to examine the right upper quadrant and left upper quadrant and it was just not possible. There was no overt findings of bowel compromise. There was no overt findings of purulence or feculence within the abdominal cavity. There was ascites noted. Please note that prior to beginning my surgery and before prepping and draping, we had difficulty inserting a Colmenares catheter. Therefore, Dr. Maria Antonia Dolan was consulted from [facility]. I sincerely appreciate his help. We refer the reader to his operative note. Colmenares was passed prior to prepping and draping our procedure.  
 
PROCEDURE:  The patient was brought to the operating room theater and a standard pause observed by the entire staff, in which the patient's name and procedure were clearly identified by all. Next, he was placed in the supine position and padded according to standard protocol. He was prepped and draped in surgical fashion. Next, a midline incision was made from the level of his umbilicus down to the pubic symphysis. Dissection proceeded down through the subcutaneous tissue until the fascia was encountered. The fascia was then incised. I grasped the peritoneum between two clamps and sharply incised. Upon gaining entrance into the abdominal cavity, it was quite apparent that there was significant adhesions. There was straw-colored ascites noted. There was no obvious purulence. There was no obvious enteric staining or feculence. Very carefully, I freed up adhesions to the anterior abdominal wall using Bovie dissection. Eventually, I was able to free up the small bowel to identify the sigmoid colon. The sigmoid colon had significant adhesions. This also required extensive lysis of adhesions. Eventually, I was able to identify a redundant loop of sigmoid colon to serve as a colostomy. This was quite distended consistent with obstruction. Please note that also his small bowel was quite distended consistent with obstruction. At this point, at the preoperatively marked site, a disk of skin and subcutaneous tissue was excised from the left abdominal wall. Dissection proceeded on to the subcutaneous tissue until fascia was encountered. The fascia was incised. A cruciate incision was made. The muscle-splitting dissection was developed into the abdominal cavity. At this point, I tried to deliver a loop of the sigmoid colon out through the aperture. However, given how dilated his bowel was, there was no way of being able to deliver the sigmoid colon through this site. Therefore, ultimately, I decided to transect the proximal sigmoid colon.   This allowed the end colostomy to reach the skin easily. The downstream ear was then prepared to make a mucous fistula. Therefore, in the left lower quadrant, a disk of skin and subcutaneous tissue was excised. A corner of the downstream staple line was then brought up through this site to serve as a mucous fistula. With all sponge counts and instrument counts correct, I tacked the downstream colon to the abdominal wall using 3-0 silk. Next, a six-pack of Seprafilm was placed in the viscera. A 19-Polish drain was placed in the left lower quadrant prior to placing the Seprafilm. Exparel was injected. Next, the fascia was closed using a 1 looped PDS. Subcutaneous tissue was irrigated with saline. The skin was then closed using a 4-0 Monocryl subcuticular stitch followed by Dermabond. Next, the colostomy was matured. I resected the staple line. This was matured in a Neena-like fashion using 3-0 and 4-0 Vicryl. In the same way, the mucous fistula was similarly Brooked. This too was performed with a 3-0 Vicryl and 4-0 Vicryl. A standard drain stitch was used to secure the drain to the abdominal wall. Hayden Emerson MD 
 
 
[id number] 
D:  05/05/2020 12:27 
T:  05/05/2020 15:58 JOB #:  [id number] CC:  Royce Lamb MD

## 2020-05-05 NOTE — ROUTINE PROCESS
Bedside and Verbal shift change report given to Claudeen Lapidus, RN (oncoming nurse) by Keith Gregory RN (offgoing nurse). Report included the following information SBAR, Intake/Output, MAR and Recent Results.

## 2020-05-05 NOTE — PROGRESS NOTES
2205 
CHG bath performed, linen changed, gown changed. Hibiclens solution also used as per MD order.

## 2020-05-05 NOTE — PERIOP NOTES
TRANSFER - OUT REPORT: 
 
Verbal report given to paul rn(name) on Eric Ulloa  being transferred to 526(unit) for routine post - op Report consisted of patients Situation, Background, Assessment and  
Recommendations(SBAR). Information from the following report(s) SBAR, Intake/Output and MAR was reviewed with the receiving nurse. Lines: PICC Triple Lumen 05/04/20 Right;Brachial (Active) Central Line Being Utilized No 5/5/2020  1:10 PM  
Criteria for Appropriate Use Limited/no vessel suitable for conventional peripheral access 5/5/2020  1:10 PM  
Site Assessment Clean, dry, & intact 5/5/2020  1:10 PM  
Phlebitis Assessment 0 5/5/2020  1:10 PM  
Infiltration Assessment 0 5/5/2020  1:10 PM  
Arm Circumference (cm) 34 cm 5/4/2020  3:54 PM  
Date of Last Dressing Change 05/04/20 5/5/2020  3:58 AM  
Dressing Status Clean, dry, & intact 5/5/2020  3:58 AM  
External Catheter Length (cm) 0 centimeters 5/4/2020  3:54 PM  
Dressing Type Transparent;Disk with Chlorhexadine gluconate (CHG) 5/5/2020  3:58 AM  
Action Taken Open ports on tubing capped 5/5/2020  3:58 AM  
Hub Color/Line Status White;Capped 5/5/2020  3:58 AM  
Positive Blood Return (Site #1) Yes 5/5/2020  3:58 AM  
Hub Color/Line Status Red; Infusing 5/5/2020  3:58 AM  
Positive Blood Return (Site #2) Yes 5/5/2020  3:58 AM  
Hub Color/Line Status Juan Manuel Fern; Infusing 5/5/2020  3:58 AM  
Positive Blood Return (Site #3) Yes 5/5/2020  3:58 AM  
Alcohol Cap Used Yes 5/5/2020  3:58 AM  
   
Peripheral IV 05/02/20 Anterior;Distal;Left Forearm (Active) Site Assessment Clean, dry, & intact 5/5/2020  1:10 PM  
Phlebitis Assessment 0 5/5/2020  1:10 PM  
Infiltration Assessment 0 5/5/2020  1:10 PM  
Dressing Status Clean, dry, & intact 5/5/2020  1:10 PM  
Dressing Type Tape;Transparent 5/5/2020  1:10 PM  
Hub Color/Line Status Pink 5/5/2020  1:10 PM  
Action Taken Open ports on tubing capped 5/5/2020  3:58 AM  
Alcohol Cap Used Yes 5/5/2020  3:58 AM  
   
 Peripheral IV 05/05/20 Right Forearm (Active) Site Assessment Clean, dry, & intact 5/5/2020  1:10 PM  
Phlebitis Assessment 0 5/5/2020  1:10 PM  
Infiltration Assessment 0 5/5/2020  1:10 PM  
Dressing Status Clean, dry, & intact 5/5/2020  1:10 PM  
Dressing Type Tape;Transparent 5/5/2020  1:10 PM  
Hub Color/Line Status Green 5/5/2020  1:10 PM  
  
 
Opportunity for questions and clarification was provided. Patient transported with: 
 Monitor Registered Nurse

## 2020-05-05 NOTE — PROGRESS NOTES
Physical Therapy [address] [facility] [address] Chart reviewed, Pt off floor for diverting colostomy creation procedure. PT will continue to follow Nidhi Wood

## 2020-05-05 NOTE — PROGRESS NOTES
Patient pulled out NG tube. MD notified says it is okay to leave out but patient will need sitter to ensure he does not pull lopez catheter which is to stay in for one month until he sees urology. Patient is still currently NPO with meds and sips of water. Will continue to monitor.

## 2020-05-05 NOTE — CONSULTS
[address] Name:  Valeria Barnard 
MR#:  [id number] :  1940 ACCOUNT #:  [de-identified] DATE OF SERVICE:  2020 REFERRING PHYSICIAN:  Ian Robin MD 
 
CHIEF COMPLAINT:  Inability to place Colmenares catheter intraoperatively, requesting urologic evaluation and catheter placement. HISTORY OF PRESENT ILLNESS:  This is a 76-year-old gentleman who was admitted with a history of abdominal distension and subsequently on evaluation was found to have a rectal mass. He has a long history of dementia and had nausea and vomiting and this has been refractory to antiemetics and an imaging had shown large and small bowel distension and possible enteritis. He was admitted, had an elevated creatinine at 2.24 and white count of 17. His blood cultures were negative, but he did have a positive urine culture for alpha Streptococcus. He had been placed on antibiotics and was found to have a firm nodular mass in his rectum and the patient had subsequently been evaluated and found to have a colon mass consistent with rectal cancer. He was seen and evaluated by Dr. Ramon Dunn and was scheduled for a diverting colostomy to decompress his bowel prior to management of this rectal tumor. This has been apparently somewhat long-term localized to his rectum causing his abdominal distension and requiring this diverting colostomy. He has no known history of urologic problems based on his history in his chart. He is sleeping, intubated and unable to ask any questions. There was an attempted Colmenares catheter placement which was unsuccessful and we were asked to see him for this. I did review his chart in his history of having a sigmoidoscopy and this planned diverting colostomy. SOCIAL HISTORY:  He is a former smoker. FAMILY HISTORY:  Noncontributory. MEDICATIONS:  Medications in his chart are reviewed.   He has been on antibiotics as mentioned and this has been cefepime and Normodyne, Protonix, Carafate, Zofran. REVIEW OF SYSTEMS:  Reviewed as per his chart. He is unable to give me any answers as he is intubated. PHYSICAL EXAMINATION: 
GENERAL:  He is intubated at this time on the OR table. ABDOMEN:  His abdomen is slightly distended. GENITAL:  Exam is normal of testicles and scrotum and phallus. No urethral bleeding. EXTREMITIES:  Trace edema bilaterally. LABORATORY DATA:  I did review his laboratory values again showing a white count of 13.5 and hemoglobin of 41.7. Creatinine elevated at 1.46. His urinalysis on admission did show greater than 100 white cells, 5-10 red cells and 4+ bacteria. His creatinine was elevated at 1.33. His urine culture was positive for enterococcus. A CT scan is reviewed. He has a right adrenal adenoma, left renal cyst, no hydronephrosis and no perinephric masses noted. He has bladder circumferential wall thickening and prostatic calcifications and extensive large and small bowel distension and thoughts of possible enteritis. ASSESSMENT: 
1. 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 We will dictate a procedure note in that regard. I discussed this with Dr. Francie Encarnacion and we will plan to place a Colmenares catheter. 2.  Colon mass for diverting colostomy. 3.  Medical problems as outlined. Dillan Sosua MD 
 
 
RB/V_[id number]/ 
D:  05/05/2020 9:59 
T:  05/05/2020 12:09 
JOB #:  [id number] CC:  Conchita Laureano MD

## 2020-05-05 NOTE — PERIOP NOTES
Colmenares catheter attempted with no success by RN and Dr. Sarah Dexter. Urologist on call notified.

## 2020-05-05 NOTE — ROUTINE PROCESS
Bedside shift change report given to Sophia Pulido RN (oncoming nurse) by Tereso Boone RN (offgoing nurse). Report included the following information SBAR, Kardex, Intake/Output, MAR and Accordion.

## 2020-05-05 NOTE — PROGRESS NOTES
GI note For diverting colostomy today. Dr. Dane Kelly will follow up with you tomorrow.  
Pamela Elizabeth MD

## 2020-05-05 NOTE — ANESTHESIA POSTPROCEDURE EVALUATION
Procedure(s): OPEN DIVERTING COLOSTOMY CREATION. general 
 
Anesthesia Post Evaluation Multimodal analgesia: multimodal analgesia used between 6 hours prior to anesthesia start to PACU discharge Patient location during evaluation: bedside Patient participation: complete - patient participated Level of consciousness: awake Pain management: adequate Airway patency: patent Anesthetic complications: no 
Cardiovascular status: acceptable Respiratory status: acceptable Hydration status: acceptable Vitals Value Taken Time /80 5/5/2020  2:20 PM  
Temp 36.5 °C (97.7 °F) 5/5/2020  1:24 PM  
Pulse 74 5/5/2020  2:25 PM  
Resp 17 5/5/2020  2:25 PM  
SpO2 95 % 5/5/2020  2:25 PM  
Vitals shown include unvalidated device data.

## 2020-05-05 NOTE — BRIEF OP NOTE
Brief Postoperative Note Patient: Apolonia Hoang YOB: 1940 MRN: [id number] Date of Procedure: 5/5/2020 Pre-Op Diagnosis: obstructing rectal mass Post-Op Diagnosis: same, extensive intra-abdominal adhesions Procedure(s): 
1) laparotomy, exploratory, 2) lysis of adhesions, 3) end sigmoid colostomy, 4) distal sigmoid mucus fistula Surgeon(s): 
MD Samson Nair MD 
 
Surgical Assistant: None Anesthesia: General  
 
Estimated Blood Loss (mL): 100 Complications: None Specimens: * No specimens in log * Implants: * No implants in log * Drains:  
Krish-Guillen Drain 05/05/20 Right Abdomen (Active) Nasogastric Tube 05/05/20 (Active) Orogastric Tube 05/05/20 (Active) Findings: unable to pass lopez prior to prepping and draping for surgry. Consult to Urology Electronically Signed by Jevon Frye MD on 5/5/2020 at 1:04 PM

## 2020-05-05 NOTE — ANESTHESIA PREPROCEDURE EVALUATION
Anesthetic History No history of anesthetic complications Review of Systems / Medical History Patient summary reviewed and pertinent labs reviewed Pulmonary Within defined limits Neuro/Psych Dementia Cardiovascular Hypertension Exercise tolerance: >4 METS Comments: Mild trop elevation to 0.1 Cardiology eval and echo performed showed no signs of ischemia Denies Chest pain or SOB  
GI/Hepatic/Renal 
  
 
 
Renal disease (resolving): ARF Endo/Other Within defined limits Other Findings Physical Exam 
 
Airway Mallampati: II 
 
Neck ROM: normal range of motion Mouth opening: Normal 
 
 Cardiovascular Rhythm: regular Rate: normal 
 
 
 
 Dental 
 
 
Comments: Very loose tooth on the bottom Pulmonary Breath sounds clear to auscultation Abdominal 
 
 
 
 Other Findings Anesthetic Plan ASA: 3 Anesthesia type: general 
 
 
 
 
Induction: Intravenous Anesthetic plan and risks discussed with: Patient and Healthcare power of  Spoke with POA daughter on phone. Risks discussed and ok to proceed

## 2020-05-06 NOTE — PROGRESS NOTES
[address] [facility] at [address] [place] [address] [phone] Hematology / Oncology Follow up Reason for Visit:  
Dasia Harris is a 67 y.o. male who is seen in consultation at the request of Dr. José Dixon for evaluation of probable rectal cancer. History of Present Illness:  
Dasia Harris is a 67 y.o. male with mild dementia admitted with n/v, found to have rectal mass. He was admitted 4/30/20 after 10 days of n/v/d refractory to trial of antiemetic at home. Imaging showed small and large bowel distention as well as possible enteritis. Admission labs notable for WBC 17, Cr 2.24. Blood cx negative, but urine culture growing apparent alpha streptococcus. Pt currently on Cefepime and Flagyl. Pt evaluated by Dr. Gordon Officer in GI, found to have firm nodular mass in rectum. Per patient's step daughter, patient has been weak for the past 2 weeks. However, prior to that he has been active with dressing himself, taking walks in the neighborhood. Pt did have prior colonoscopy approx 10 yrs ago at [facility] thinks her mother might have mentioned that pt had colon cancer in the past, but she is unsure. In further speaking with step-daughter, she states she is patient's POA. She states his short-term memory has been quite poor and often cannot remember whether he ate that day or not. She does not feel that he would want aggressive treatment measures. Interval History:  
 
Alert; pleasant. Does not remember having surgery yesterday or why he came in the hospital. Denies pain or SOB. Pt states he has 3 sons that live in [address] [place] [address]. Unable to state when he spoke with them last.  
 
Wound care team currently in room; changing ostomy bag. Past Medical History:  
Diagnosis Date  History of vascular access device 05/04/2020 [facility] VAT 5Fr Triple PICC Right brachial 43cm for TPN Past Surgical History:  
Procedure Laterality Date 2021 Dee Becerra N/A 5/1/2020 SIGMOIDOSCOPY FLEXIBLE performed by Cuauhtemoc Pacheco MD at [address] [place] [address] Social History Tobacco Use  Smoking status: Former Smoker  Smokeless tobacco: Never Used Substance Use Topics  Alcohol use: Not on file History reviewed. No pertinent family history. Current Facility-Administered Medications Medication Dose Route Frequency  TPN ADULT - CENTRAL AA 5% D20% W/ ELECTROLYTES AND CA   IntraVENous CONTINUOUS  
 magnesium sulfate 2 g/50 ml IVPB (premix or compounded)  2 g IntraVENous ONCE  
 dextrose 5% - 0.45% NaCl with KCl 40 mEq/L infusion   IntraVENous CONTINUOUS  
 HYDROmorphone (PF) 25 mg/50 mL (DILAUDID) PCA   IntraVENous TITRATE  fat emulsion 20% (LIPOSYN, INTRAlipid) infusion 500 mL  500 mL IntraVENous Q MON, WED & FRI  TPN ADULT - CENTRAL AA 5% D20% W/ ELECTROLYTES AND CA   IntraVENous CONTINUOUS  
 morphine injection 2 mg  2 mg IntraVENous Q3H PRN  
 sodium bicarbonate tablet 650 mg  650 mg Oral BID  alteplase (CATHFLO) 1 mg in sterile water (preservative free) 1 mL injection  1 mg InterCATHeter PRN  
 labetaloL (NORMODYNE) tablet 100 mg  100 mg Oral Q12H  
 heparin (porcine) injection 5,000 Units  5,000 Units SubCUTAneous Q8H  
 metroNIDAZOLE (FLAGYL) IVPB premix 500 mg  500 mg IntraVENous Q12H  
 benzocaine (HURRICANE) 20 % spray   Mucous Membrane PRN  
 sodium chloride (NS) flush 5-40 mL  5-40 mL IntraVENous Q8H  
 sodium chloride (NS) flush 5-40 mL  5-40 mL IntraVENous PRN  
 acetaminophen (TYLENOL) tablet 650 mg  650 mg Oral Q4H PRN  
 ondansetron (ZOFRAN) injection 4 mg  4 mg IntraVENous Q4H PRN  
 cefepime (MAXIPIME) 2 g in 0.9% sodium chloride (MBP/ADV) 100 mL  2 g IntraVENous Q12H  
 labetaloL (NORMODYNE;TRANDATE) 20 mg/4 mL (5 mg/mL) injection 10 mg  10 mg IntraVENous Q4H PRN  pantoprazole (PROTONIX) 40 mg in 0.9% sodium chloride 10 mL injection  40 mg IntraVENous DAILY No Known Allergies Review of Systems: A complete review of systems was obtained, negative except as described above. Physical Exam:  
 
Visit Vitals BP (!) 143/96 (BP 1 Location: Left arm, BP Patient Position: At rest) Comment: nurse notified Pulse 88 Temp 99 °F (37.2 °C) Comment: nurse notified Resp 19 Ht 5' 8\" (1.727 m) Wt 95.4 kg (210 lb 5.1 oz) SpO2 98% BMI 31.98 kg/m² ECOG PS: 3 General: No distress Eyes:  anicteric sclerae HENT: Atraumatic with normal appearance of ears and nose; OP clear Neck: Supple; no thyromegaly Respiratory: anteriorly CTAB, normal respiratory effort CV: Normal rate, regular rhythm, no murmurs, no peripheral edema GI: ostomy noted;slight tenderness; Soft , no hepatomegaly, no splenomegaly: NOEMÍ x 1 noted : lopez noted MS: Digits without clubbing or cyanosis. Skin: No rashes, ecchymoses, or petechiae. Normal temperature, turgor, and texture. Neuro/Psych: Alert, oriented to person, place, year but not month. Appropriate affect, questionable memory,  Poor judgment/insight Results:  
 
Lab Results Component Value Date/Time WBC 10.8 05/06/2020 04:08 AM  
 HGB 11.9 (L) 05/06/2020 04:08 AM  
 HCT 35.9 (L) 05/06/2020 04:08 AM  
 PLATELET 365 (L) 44/80/5984 04:08 AM  
 MCV 91.3 05/06/2020 04:08 AM  
 ABS. NEUTROPHILS 9.6 (H) 05/06/2020 04:08 AM  
 
Lab Results Component Value Date/Time Sodium 143 05/06/2020 04:08 AM  
 Potassium 4.0 05/06/2020 04:08 AM  
 Chloride 118 (H) 05/06/2020 04:08 AM  
 CO2 17 (L) 05/06/2020 04:08 AM  
 Glucose 129 (H) 05/06/2020 04:08 AM  
 BUN 22 (H) 05/06/2020 04:08 AM  
 Creatinine 1.28 05/06/2020 04:08 AM  
 GFR est AA >60 05/06/2020 04:08 AM  
 GFR est non-AA 54 (L) 05/06/2020 04:08 AM  
 Calcium 7.0 (L) 05/06/2020 04:08 AM  
 
Lab Results Component Value Date/Time Bilirubin, total 0.6 05/05/2020 02:36 AM  
 ALT (SGPT) 18 05/05/2020 02:36 AM  
 AST (SGOT) 18 05/05/2020 02:36 AM  
 Alk.  phosphatase 50 05/05/2020 02:36 AM  
 Protein, total 5.6 (L) 2020 02:36 AM  
 Albumin 2.5 (L) 2020 02:36 AM  
 Globulin 3.1 2020 02:36 AM  
 
 
Imagin/29/20 KUB: IMPRESSION: Gaseous small and large bowel distention. 30 abd/pelvis CT: IMPRESSION: 
Extensive small and large bowel distention. Fluid-filled loops of small and 
large bowel. The absence of IV contrast limits evaluation, consider severe 
enteritis. Distal circumferential rectal wall thickening may be related to 
infectious process, neoplastic etiology is not excluded. Cholelithiasis. Circumferential bladder wall thickening most likely related to chronic outlet 
obstruction. Neoplastic etiologies deemed less likely. Additional incidental findings are as described above. 20 KUB: IMPRESSION: Continued large and small bowel distention. Procedures: 
 
Flex sig 20: Findings: This was an enema prep as he would not tolerate PO prep due to his gastroenteritis. The rectum is cleansed but there is generous stool in the sigmoid. 
Ruma Seller is a malignant appearing fungating friable circumferental mass from the dentate up to 10cm. We were able to just get the scope to navigate with mild resistance, I estimate the lumen to be 11-12mm. Above 10cm there's too much stool to safely navigate. Cold forceps biopsies obtained for histology. Impression: The endoscopic impression is that of rectal carcinoma. Biopsies pending. He and daughter report previous colonoscopy and GI evaluation having been done years ago at [facility] but I have no access to those records.  
  
Recommendations: - Await pathology. - CT has been done, get CEA, get colorectal surgery consult. 2020 XR ABD IMPRESSION: Single semiupright view of the upper abdomen demonstrates the tip 
and side-port of the NG tube to overlie the expected location of the stomach. Note is made of multiple dilated gas-filled loops of small bowel. Assessment and Recommendations:  
Pollo Stover is a 67 y.o. male 1. Rectal mass: Stage unclear at this time and need to rule out distant metastases with imaging using IV contrast when feasible. Have reviewed with  patient's step-daughter that standard of care if no distant metastatic disease would be surgery or neoadjuvant chemoradiation or definitive chemoradiation. She feels that due to patient's dementia, it would be very difficult to care for him and she does not feel he would want aggressive measures of treatment. She is more interested in palliative options. CEA 2.9. Hx of previous ileocecectomy in past noted during OR.  
-- 5/1 flex /sig/ Dr Naya Avendano :  biopsy results pending: discussed with pathology; results expected tomorrow 5/7 
-- 5/5 Colon/rectal surgery/ Dr Kumar Fiore performed exp lap; lysis of adhesions; end sigmoid colostomy with distal sigmoid colon mucous fistula:  path pending --  needs CT Chest/ abd/pelviswith IV contrast to complete staging; will obtain today 2. Nutrition TPN in use 3. VERONIQUE: 2/2 n/v and initially improved with IVF hydration - but Cr slight improvement at  1.28 today. 4. Alpha streptococcus UTI: On Cefepime, Flagyl 
--Urology consulted:difficulty inserting lopez during surgery; 5. Dementia: Poor short term memory 6. Goals of care Palliative team following Plan reviewed with Dr Kd Solis Signed By: Bibi Leon NP May 6, 2020

## 2020-05-06 NOTE — WOUND CARE
WOCN OSTOMY PROGRESS NOTE: 
Patient is POD 1 for an Exploratory laparotomy, Lysis of adhesions, and sigmoid colostomy creation with distal sigmoid colon mucous fistula. Patient is lying in bed, no distress. PCT is sitting with patient at bedside. Findings/Treatments Colostomy appliance leaking. No dressing over mucus fistula which is also leaking a moderate amount of brown liquid stool. Colostomy draining a large amount of  thick brown liquid stool. Per PCT/sitter, the colostomy is requiring to be emptied at least each hour. Old appliance removed to reveal a pink, moist, budded stoma. No separation noted at the stoma. Peristomal skin is intact. Mucus fistula is noted distal to the colostomy as a small opening with stitches on the left end. Skin around fistula is intact. Skin cleansed with moist wash cloths and appliances prepped an applied: Small/red size Coloplast appliance to Colostomy and a pediatric Aquilino appliance to the mucus fistula. Shortly after appliance change, a large a mount of drainage noted in pouches which needed to be emptied. R.abdomen NOEMÍ: Old dressing removed. Skin cleansed with saline and split gauze applied. Patient cleaned up and linens changed, repositioned, turned to left side and heels floated. Teaching/Subjects covered Patient unable to retain information due to poor short term memory. Not appropriate to teach. Will continue to reassess for opportunities.

## 2020-05-06 NOTE — PROGRESS NOTES
CRS 
Pt without complaints. High output from colostomy and mucus fistula Flowsheet, labs reviewed Abd: soft, nt Inc: c/d/i Colostomy: large stool Mucus fistula: thin, feculent drainage Drain: serosang Plan: 
Obstructing rectal mass causing colonic obstruction with reflux back into small bowel (history of ileocecal resection in past). Colostomy  (larger, left upper quad) and mucus fistula (smaller, left lower quad) is decompressing but not necessarily a sign of return of bowel fctn. I anticipate an ileus given amount of small bowel distension in OR. In OR, ngt was draining feculent material. Unfortunately, pt removed ngt. Will hold replacing ngt as, given his dementia, high likelihood to remove it on his own. NPO. Increase tpn to 75mL/hr per dietician recs (thank you). Can probably dc maint ivf tomorrow. Hopefull, mucus fistula should stop draining as stump becomes more decompressed. May need to pouch mucus fistula for now given considerable drainage. With time, will be able to cover mucus fistula with a bandaid. Will ask WOCN to see.

## 2020-05-06 NOTE — PROGRESS NOTES
Gastrointestinal Progress Note 5/6/2020 Admit Date: 4/29/2020 Subjective:  
 
New Complaints Today:  No: wishes to eat. No pain, vomiting Biopsies returned poorly differentiated adenocarcinoma Current Facility-Administered Medications Medication Dose Route Frequency  TPN ADULT - CENTRAL AA 5% D20% W/ ELECTROLYTES AND CA   IntraVENous CONTINUOUS  
 dextrose 5% - 0.45% NaCl with KCl 40 mEq/L infusion   IntraVENous CONTINUOUS  
 HYDROmorphone (PF) 25 mg/50 mL (DILAUDID) PCA   IntraVENous TITRATE  fat emulsion 20% (LIPOSYN, INTRAlipid) infusion 500 mL  500 mL IntraVENous Q MON, WED & FRI  TPN ADULT - CENTRAL AA 5% D20% W/ ELECTROLYTES AND CA   IntraVENous CONTINUOUS  
 morphine injection 2 mg  2 mg IntraVENous Q3H PRN  
 sodium bicarbonate tablet 650 mg  650 mg Oral BID  alteplase (CATHFLO) 1 mg in sterile water (preservative free) 1 mL injection  1 mg InterCATHeter PRN  
 labetaloL (NORMODYNE) tablet 100 mg  100 mg Oral Q12H  
 heparin (porcine) injection 5,000 Units  5,000 Units SubCUTAneous Q8H  
 metroNIDAZOLE (FLAGYL) IVPB premix 500 mg  500 mg IntraVENous Q12H  
 benzocaine (HURRICANE) 20 % spray   Mucous Membrane PRN  
 sodium chloride (NS) flush 5-40 mL  5-40 mL IntraVENous Q8H  
 sodium chloride (NS) flush 5-40 mL  5-40 mL IntraVENous PRN  
 acetaminophen (TYLENOL) tablet 650 mg  650 mg Oral Q4H PRN  
 ondansetron (ZOFRAN) injection 4 mg  4 mg IntraVENous Q4H PRN  
 cefepime (MAXIPIME) 2 g in 0.9% sodium chloride (MBP/ADV) 100 mL  2 g IntraVENous Q12H  
 labetaloL (NORMODYNE;TRANDATE) 20 mg/4 mL (5 mg/mL) injection 10 mg  10 mg IntraVENous Q4H PRN  pantoprazole (PROTONIX) 40 mg in 0.9% sodium chloride 10 mL injection  40 mg IntraVENous DAILY Objective:  
 
Blood pressure 131/81, pulse 76, temperature 98.9 °F (37.2 °C), resp. rate 18, height 5' 8\" (1.727 m), weight 95.4 kg (210 lb 5.1 oz), SpO2 96 %. 05/06 0701 - 05/06 1900 In: -  
 Out: 3452 [Urine:600; Drains:30] 
 
05/04 1901 - 05/06 0700 In: 3796 [P.O.:30; I.V.:3766] Out: 7051 [Urine:2350; Drains:155] EXAM:  GENERAL: alert, cooperative, no distress, HEART: regular rate and rhythm, LUNGS: chest clear, no wheezing, rales, normal symmetric air entry, ABDOMEN:  Bowel sounds absent, liver is not enlarged, spleen is not enlarged and EXTREMITY: no edema Data Review Recent Results (from the past 24 hour(s)) CBC WITH AUTOMATED DIFF Collection Time: 05/06/20  4:08 AM  
Result Value Ref Range WBC 10.8 4.1 - 11.1 K/uL  
 RBC 3.93 (L) 4.10 - 5.70 M/uL  
 HGB 11.9 (L) 12.1 - 17.0 g/dL HCT 35.9 (L) 36.6 - 50.3 % MCV 91.3 80.0 - 99.0 FL  
 MCH 30.3 26.0 - 34.0 PG  
 MCHC 33.1 30.0 - 36.5 g/dL  
 RDW 15.4 (H) 11.5 - 14.5 % PLATELET 516 (L) 764 - 400 K/uL MPV 11.1 8.9 - 12.9 FL  
 NRBC 0.0 0  WBC ABSOLUTE NRBC 0.00 0.00 - 0.01 K/uL NEUTROPHILS 89 (H) 32 - 75 % LYMPHOCYTES 6 (L) 12 - 49 % MONOCYTES 4 (L) 5 - 13 % EOSINOPHILS 0 0 - 7 % BASOPHILS 0 0 - 1 % IMMATURE GRANULOCYTES 1 (H) 0.0 - 0.5 % ABS. NEUTROPHILS 9.6 (H) 1.8 - 8.0 K/UL  
 ABS. LYMPHOCYTES 0.6 (L) 0.8 - 3.5 K/UL  
 ABS. MONOCYTES 0.4 0.0 - 1.0 K/UL  
 ABS. EOSINOPHILS 0.0 0.0 - 0.4 K/UL  
 ABS. BASOPHILS 0.0 0.0 - 0.1 K/UL  
 ABS. IMM. GRANS. 0.1 (H) 0.00 - 0.04 K/UL  
 DF AUTOMATED METABOLIC PANEL, BASIC Collection Time: 05/06/20  4:08 AM  
Result Value Ref Range Sodium 143 136 - 145 mmol/L Potassium 4.0 3.5 - 5.1 mmol/L Chloride 118 (H) 97 - 108 mmol/L  
 CO2 17 (L) 21 - 32 mmol/L Anion gap 8 5 - 15 mmol/L Glucose 129 (H) 65 - 100 mg/dL BUN 22 (H) 6 - 20 MG/DL Creatinine 1.28 0.70 - 1.30 MG/DL  
 BUN/Creatinine ratio 17 12 - 20 GFR est AA >60 >60 ml/min/1.73m2 GFR est non-AA 54 (L) >60 ml/min/1.73m2 Calcium 7.0 (L) 8.5 - 10.1 MG/DL MAGNESIUM Collection Time: 05/06/20  4:08 AM  
Result Value Ref Range Magnesium 1.3 (L) 1.6 - 2.4 mg/dL PHOSPHORUS Collection Time: 05/06/20  4:08 AM  
Result Value Ref Range Phosphorus 2.5 (L) 2.6 - 4.7 MG/DL Assessment:  
 
Active Problems: 
  Enteritis (4/30/2020) Renal insufficiency (4/30/2020) Dementia ([address] (4/30/2020) Elevated troponin (4/30/2020) Leukocytosis (4/30/2020) HTN (hypertension) (4/30/2020) Adenocarcinoma Plan:  
 
1. Would not yet feed; no bowel sounds 2. Otherwise routine post op care 3. Will see again as necessary

## 2020-05-06 NOTE — PROGRESS NOTES
Physical Therapy- Spoke with OT and patient currently soiled from loss of seal around ostomy. PCT at bedside assisting with clean up and change of bed linens. Will return later for PT.

## 2020-05-06 NOTE — CONSULTS
Palliative Medicine Consult [place]: [phone] ([phone]) Patient Name: Milvia Miller YOB: 1940 Date of Initial Consult: 5/3/2020 Reason for Consult: Care decisions Requesting Provider: Dr. Carrillo Joseph Primary Care Physician: Yamile Bashir MD 
 
 SUMMARY:  
Milvia Miller is a 67 y.o. with a past history of mild to moderate dementia, possible chronic kidney disease, hypertension, who was admitted on 4/29/2020 from home with a diagnosis of abdominal distention. Current medical issues leading to Palliative Medicine involvement include: Care decisions. Chart reviewpatient is a 25-year-old male admitted to the hospital on 4/30 with increasing abdominal distention, nausea, and vomiting. CT scan showing extensive small and large bowel distention with fluid filled loops of small and large bowel. There appeared to be severe enteritis as well as distal circumferential rectal wall thickening. Patient subsequently had endoscopic evaluation which was concerning for rectal carcinoma. He also has been seen by colorectal surgery who subsequently thinks he will need a diverting colostomy secondary to ongoing abdominal distention. Our team is been asked to see him for goals of care Social history patient apparently lived in [place]. 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 Apparently has 3 sons in the [place] area. He lives with his stepdaughter, Estefania Hawkins. He was  to her mom for 20+ years. She states she has a routine for him at home. She usually gets them up around 730. He continues to dress like he is going to work every day. But she is noticed over the last several weeks that has had decreased p.o. intake and much more fatigued. PALLIATIVE DIAGNOSES:  
1. Goals of care discussion 2. DNR discussion 3. Suspected rectal cancer 4. Mild-moderate dementia 5. Debility 6. Advance care planning  PLAN:  
 1. Met with patient once again with significant short-term memory loss secondary to underlying Alzheimer's dementia. He still is unclear why he required surgery and now has an ostomy. Diverting colostomy done yesterday per Dr. Anabel Bradley. Patient also with mucous fistula. 2. Talk with his stepdaughter via phone. Reviewed the last several days and the events from yesterday. Explained that he seems to be doing okay today with the colostomy. He did pull out his NG tube last night. Discussed potential options moving forward as far as management of his rectal CA. Did tell her that he will have staging CT scans done today. She has been praying a lot over the last several days and she feels confident that he would not want to proceed with chemotherapy or radiation. She does not think he would tolerate this well and really wants to him to have as much \"quality of life \"as possible. She is happy that hopefully the diverting colostomy will at least provide some comfort secondary to the patient's obstruction. She would be interested in the support of hospice at the time of discharge. Her mother/patient's wife was a hospice nurse with [facility] in [place] so she is familiar with hospice services. Our team will continue to follow and support her during this hospitalization and assist with transition to home hospice if that is the final decision. 3. Goals of care likely home with support hospice. Ongoing discussions with patient's stepdaughter. 4. Advance care planning explained the importance to the stepdaughter and the need for us to see medical power of  paperwork. Explained to her that in this state, if there is no paperwork that assigns her as medical POA, then we would need to talk with his 3 sons in [place]. Apparently, they have had little involvement in his life according to his stepdaughter. She states she will fax the paperwork to our office.   She is hoping to have that to us within the next 12 to 24 hours. 5. CODE STATUS reviewed resuscitation with his stepdaughter and the limitations of CPR/intubation in patients of Mr. Fitzpatrick's age and his comorbidities. She does agree on changing his CODE STATUS to no attempts at resuscitation after discussing further. She also had prayed about this over the last several days. 6. Symptom managementinpatient team currently managing. No acute symptoms for us to manage. Denies any pain. 7. Psychosocialpatient does appear to have good support from his stepdaughter. No spiritual concerns identified 8. Discussed with bedside nurse, Nichole-Oncology/NP 9. Initial consult note routed to primary continuity provider and/or primary health care team members 10. Communicated plan of care with: Palliative [facility] Team 
 
 GOALS OF CARE / TREATMENT PREFERENCES:  
 
GOALS OF CARE: 
Patient/Health Care Proxy Stated Goals: Other (comment)(Waiting pathology before making a final decision on goals of care) TREATMENT PREFERENCES:  
Code Status: DNR Advance Care Planning: 
[x] [facility] Interdisciplinary Team has updated the ACP Navigator with [place] and Patient Capacity Advance Care Planning 4/30/2020 Confirm Advance Directive None Patient Would Like to Complete Advance Directive No  
 
 
Medical Interventions: Full interventions Other Instructions: Other: As far as possible, the palliative care team has discussed with patient / health care proxy about goals of care / treatment preferences for patient. HISTORY:  
 
History obtained from: Chart, patient, stepdaughter CHIEF COMPLAINT: Nausea vomiting HPI/SUBJECTIVE: The patient is:  
[x] Verbal and participatory [] Non-participatory due to:  
Patient states he feels fine. He denies any pain. 5/6-patient still unclear of medical events. Denies any pain Clinical Pain Assessment (nonverbal scale for severity on nonverbal patients):  
Clinical Pain Assessment Severity: 0 Activity (Movement): Seeking attention through movement or slow, cautious movement Duration: for how long has pt been experiencing pain (e.g., 2 days, 1 month, years) Frequency: how often pain is an issue (e.g., several times per day, once every few days, constant) FUNCTIONAL ASSESSMENT:  
 
Palliative Performance Scale (PPS): PPS: 50 PSYCHOSOCIAL/SPIRITUAL SCREENING:  
 
Palliative IDT has assessed this patient for cultural preferences / practices and a referral made as appropriate to needs (Cultural Services, Patient Advocacy, Ethics, etc.) Any spiritual / Catholic concerns: 
[] Yes /  [x] No 
 
Caregiver Burnout: 
[] Yes /  [] No /  [x] No Caregiver Present Anticipatory grief assessment:  
[x] Normal  / [] Maladaptive ESAS Anxiety: Anxiety: 0 
 
ESAS Depression: Depression: 0 REVIEW OF SYSTEMS:  
 
Positive and pertinent negative findings in ROS are noted above in HPI. The following systems were [x] reviewed / [] unable to be reviewed as noted in HPI Other findings are noted below. Systems: constitutional, ears/nose/mouth/throat, respiratory, gastrointestinal, genitourinary, musculoskeletal, integumentary, neurologic, psychiatric, endocrine. Positive findings noted below. Modified ESAS Completed by: provider Fatigue: 2 Drowsiness: 1 Depression: 0 Pain: 0 Anxiety: 0 Nausea: 4 Anorexia: 2 Dyspnea: 0 Constipation: No  
     
 
 
 PHYSICAL EXAM:  
 
From RN flowsheet: 
Wt Readings from Last 3 Encounters:  
05/06/20 210 lb 5.1 oz (95.4 kg) Blood pressure 131/81, pulse 76, temperature 98.9 °F (37.2 °C), resp. rate 18, height 5' 8\" (1.727 m), weight 210 lb 5.1 oz (95.4 kg), SpO2 96 %. Pain Scale 1: Numeric (0 - 10) Pain Intensity 1: 8 Pain Onset 1: surgical 
Pain Location 1: Abdomen Pain Orientation 1: Mid 
Pain Description 1: Aching Pain Intervention(s) 1: Medication (see MAR) Last bowel movement, if known:  
 
Constitutional: No acute distress, alert and oriented to person, knew he is in the hospital, had no recollection of why he is in the hospital 
Eyes: pupils equal, anicteric ENMT: no nasal discharge, moist mucous membranes Cardiovascular: regular rhythm, distal pulses intact Respiratory: breathing not labored, symmetric Gastrointestinal: soft, ostomy and fistula site noted Musculoskeletal: no deformity, no tenderness to palpation Skin: warm, dry Neurologic: following commands, moving all extremities Psychiatric: full affect, no hallucinations Other: 
 
 
 HISTORY:  
 
Active Problems: 
  Enteritis (4/30/2020) Renal insufficiency (4/30/2020) Dementia ([address] (4/30/2020) Elevated troponin (4/30/2020) Leukocytosis (4/30/2020) HTN (hypertension) (4/30/2020) Past Medical History:  
Diagnosis Date  History of vascular access device 05/04/2020 [facility] VAT 5Fr Triple PICC Right brachial 43cm for TPN Past Surgical History:  
Procedure Laterality Date 2021 Dee Becerra N/A 5/1/2020 SIGMOIDOSCOPY FLEXIBLE performed by Flory Rocha MD at [address] [place] [address] History reviewed. No pertinent family history. History reviewed, no pertinent family history. Social History Tobacco Use  Smoking status: Former Smoker  Smokeless tobacco: Never Used Substance Use Topics  Alcohol use: Not on file No Known Allergies Current Facility-Administered Medications Medication Dose Route Frequency  TPN ADULT - CENTRAL AA 5% D20% W/ ELECTROLYTES AND CA   IntraVENous CONTINUOUS  
 dextrose 5% - 0.45% NaCl with KCl 40 mEq/L infusion   IntraVENous CONTINUOUS  
 HYDROmorphone (PF) 25 mg/50 mL (DILAUDID) PCA   IntraVENous TITRATE  fat emulsion 20% (LIPOSYN, INTRAlipid) infusion 500 mL  500 mL IntraVENous Q MON, WED & FRI  
  TPN ADULT - CENTRAL AA 5% D20% W/ ELECTROLYTES AND CA   IntraVENous CONTINUOUS  
 morphine injection 2 mg  2 mg IntraVENous Q3H PRN  
 sodium bicarbonate tablet 650 mg  650 mg Oral BID  alteplase (CATHFLO) 1 mg in sterile water (preservative free) 1 mL injection  1 mg InterCATHeter PRN  
 labetaloL (NORMODYNE) tablet 100 mg  100 mg Oral Q12H  
 heparin (porcine) injection 5,000 Units  5,000 Units SubCUTAneous Q8H  
 metroNIDAZOLE (FLAGYL) IVPB premix 500 mg  500 mg IntraVENous Q12H  
 benzocaine (HURRICANE) 20 % spray   Mucous Membrane PRN  
 sodium chloride (NS) flush 5-40 mL  5-40 mL IntraVENous Q8H  
 sodium chloride (NS) flush 5-40 mL  5-40 mL IntraVENous PRN  
 acetaminophen (TYLENOL) tablet 650 mg  650 mg Oral Q4H PRN  
 ondansetron (ZOFRAN) injection 4 mg  4 mg IntraVENous Q4H PRN  
 cefepime (MAXIPIME) 2 g in 0.9% sodium chloride (MBP/ADV) 100 mL  2 g IntraVENous Q12H  
 labetaloL (NORMODYNE;TRANDATE) 20 mg/4 mL (5 mg/mL) injection 10 mg  10 mg IntraVENous Q4H PRN  pantoprazole (PROTONIX) 40 mg in 0.9% sodium chloride 10 mL injection  40 mg IntraVENous DAILY  
 
 
 
 LAB AND IMAGING FINDINGS:  
 
Lab Results Component Value Date/Time WBC 10.8 05/06/2020 04:08 AM  
 HGB 11.9 (L) 05/06/2020 04:08 AM  
 PLATELET 217 (L) 24/93/9682 04:08 AM  
 
Lab Results Component Value Date/Time Sodium 143 05/06/2020 04:08 AM  
 Potassium 4.0 05/06/2020 04:08 AM  
 Chloride 118 (H) 05/06/2020 04:08 AM  
 CO2 17 (L) 05/06/2020 04:08 AM  
 BUN 22 (H) 05/06/2020 04:08 AM  
 Creatinine 1.28 05/06/2020 04:08 AM  
 Calcium 7.0 (L) 05/06/2020 04:08 AM  
 Magnesium 1.3 (L) 05/06/2020 04:08 AM  
 Phosphorus 2.5 (L) 05/06/2020 04:08 AM  
  
Lab Results Component Value Date/Time AST (SGOT) 18 05/05/2020 02:36 AM  
 Alk.  phosphatase 50 05/05/2020 02:36 AM  
 Protein, total 5.6 (L) 05/05/2020 02:36 AM  
 Albumin 2.5 (L) 05/05/2020 02:36 AM  
 Globulin 3.1 05/05/2020 02:36 AM  
 
 No results found for: INR, PTMR, PTP, PT1, PT2, APTT, INREXT, INREXT No results found for: IRON, FE, TIBC, IBCT, PSAT, FERR No results found for: PH, PCO2, PO2 No components found for: [place] No results found for: CPK, CKMB Total time: 35 
Counseling / coordination time, spent as noted above: 35 
> 50% counseling / coordination?: yes Prolonged service was provided for  []30 min   []75 min in face to face time in the presence of the patient, spent as noted above. Time Start:  
Time End:  
Note: this can only be billed with 40087 (initial) or 75775 (follow up). If multiple start / stop times, list each separately.

## 2020-05-06 NOTE — PROGRESS NOTES
[facility] Medical Progress Note NAME: Dasia Harris :  1940 MRM:  [id number] Date/Time of service 2020  11:28 AM    
 
  
Assessment and Plan:  
 
Rectal mass / Large bowel obstruction - POA, with CT showing SB and LB distention and fluid-filled loops. Rectal wall thickening concerning for cancer. Rectal carcinoma suspected by Flex Sig, Bx pending. GI, CRS and Oncology consulted. CRS did diverting colectomy , for palliative symptom control. Still NPO due to concern with development of obstruction. Patient pulled out his own NGT. Family of patient leaning towards no treatment and presumably hospice after that. Acute protein calorie malnutrition - Due to cancer and NPO status post op. Patient getting TPN. Monitor and adjust lytes Enteritis / Leukocytosis - Continue cefepime and Flagyl for now.  
  
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 T showed circumferential bladder wall thickening most likely related to chronic outlet obstruction. UA consistent with UTI, alpha strep on Ur Cx. Continue cefepime as above.  
  
Dementia - Severe. Does not know he had surgery or why. Supportive care. At risk for delirium. Sitter 
  
HTN (hypertension) - Continue IV labetalol PRN Elevated troponin - POA, strain, due to renal insufficiency. TTE normal.  Appreciate cardiology evaluation. No further workup warranted 
  
  
Subjective: Chief Complaint:  Tolerated surgery. ROS: 
(bold if positive, if negative) Abd PainNot Tolerating PT  NPO Objective:  
 
Last 24hrs VS reviewed since prior progress note. Most recent are: 
 
Visit Vitals /81 (BP 1 Location: Left arm, BP Patient Position: At rest) Pulse 78 Temp 98.9 °F (37.2 °C) Resp 18 Ht 5' 8\" (1.727 m) Wt 95.4 kg (210 lb 5.1 oz) SpO2 96% BMI 31.98 kg/m² SpO2 Readings from Last 6 Encounters:  
20 96% Intake/Output Summary (Last 24 hours) at 2020 6809 Last data filed at 5/6/2020 1123 Gross per 24 hour Intake 2666 ml Output 4470 ml Net -1804 ml Physical Exam: 
 
Gen:  Frail, in no acute distress HEENT:  Pink conjunctivae, PERRL, hearing intact to voice, moist mucous membranes Neck:  Supple, without masses, thyroid non-tender Resp:  No accessory muscle use, clear breath sounds without wheezes rales or rhonchi 
Card:  No murmurs, normal S1, S2 without thrills, bruits or peripheral edema Abd:  Soft, non-tender, distended, reduced bowel sounds are present, no mass, ostomy Lymph:  No cervical or inguinal adenopathy Musc:  No cyanosis or clubbing Skin:  No rashes or ulcers, skin turgor is good Neuro:  Cranial nerves are grossly intact, general motor weakness, follows commands vaguely Psych:  Poor insight, oriented to person Telemetry reviewed:   normal sinus rhythm 
__________________________________________________________________ Medications Reviewed: (see below) Medications:  
 
Current Facility-Administered Medications Medication Dose Route Frequency  TPN ADULT - CENTRAL AA 5% D20% W/ ELECTROLYTES AND CA   IntraVENous CONTINUOUS  
 dextrose 5% - 0.45% NaCl with KCl 40 mEq/L infusion   IntraVENous CONTINUOUS  
 HYDROmorphone (PF) 25 mg/50 mL (DILAUDID) PCA   IntraVENous TITRATE  fat emulsion 20% (LIPOSYN, INTRAlipid) infusion 500 mL  500 mL IntraVENous Q MON, WED & FRI  TPN ADULT - CENTRAL AA 5% D20% W/ ELECTROLYTES AND CA   IntraVENous CONTINUOUS  
 morphine injection 2 mg  2 mg IntraVENous Q3H PRN  
 sodium bicarbonate tablet 650 mg  650 mg Oral BID  alteplase (CATHFLO) 1 mg in sterile water (preservative free) 1 mL injection  1 mg InterCATHeter PRN  
 labetaloL (NORMODYNE) tablet 100 mg  100 mg Oral Q12H  
 heparin (porcine) injection 5,000 Units  5,000 Units SubCUTAneous Q8H  
 metroNIDAZOLE (FLAGYL) IVPB premix 500 mg  500 mg IntraVENous Q12H  
 benzocaine (HURRICANE) 20 % spray   Mucous Membrane PRN  
  sodium chloride (NS) flush 5-40 mL  5-40 mL IntraVENous Q8H  
 sodium chloride (NS) flush 5-40 mL  5-40 mL IntraVENous PRN  
 acetaminophen (TYLENOL) tablet 650 mg  650 mg Oral Q4H PRN  
 ondansetron (ZOFRAN) injection 4 mg  4 mg IntraVENous Q4H PRN  
 cefepime (MAXIPIME) 2 g in 0.9% sodium chloride (MBP/ADV) 100 mL  2 g IntraVENous Q12H  
 labetaloL (NORMODYNE;TRANDATE) 20 mg/4 mL (5 mg/mL) injection 10 mg  10 mg IntraVENous Q4H PRN  pantoprazole (PROTONIX) 40 mg in 0.9% sodium chloride 10 mL injection  40 mg IntraVENous DAILY Lab Data Reviewed: (see below) Lab Review:  
 
Recent Labs 05/06/20 0408 05/05/20 
1349 05/05/20 
0236 WBC 10.8 16.8* 11.4* HGB 11.9* 14.3 12.7 HCT 35.9* 44.2 39.1 * 187 191 Recent Labs 05/06/20 0408 05/05/20 
1349 05/05/20 
0236  142 143  
K 4.0 5.1 5.1 * 118* 118* CO2 17* 18* 17* * 114* 119* BUN 22* 20 20 CREA 1.28 1.32* 1.33* CA 7.0* 7.7* 8.0*  
MG 1.3*  --  1.6 PHOS 2.5*  --  1.7* ALB  --   --  2.5* TBILI  --   --  0.6 SGOT  --   --  18 ALT  --   --  18 No results found for: [facility] No results for input(s): PH, PCO2, PO2, HCO3, FIO2 in the last 72 hours. No results for input(s): INR, INREXT, INREXT in the last 72 hours. All Micro Results Procedure Component Value Units Date/Time CULTURE, BLOOD [[id number]] Collected:  04/30/20 0140 Order Status:  Completed Specimen:  Blood Updated:  05/06/20 0447 Special Requests: NO SPECIAL REQUESTS Culture result: NO GROWTH 6 DAYS     
 CULTURE, URINE [[id number]]  (Abnormal) Collected:  04/30/20 1132 Order Status:  Completed Specimen:  Urine Updated:  05/03/20 1531 Special Requests: --     
  NO SPECIAL REQUESTS Reflexed from [id number] [place] Count --     
  >100,000 COLONIES/mL   Culture result:    
  AEROCOCCUS URINAE A. URINAE HAS BEEN DESCRIBED AS SUSCEPTIBLE TO PENICILLIN, AMOXICILLIN, PIPERACILLIN, CEFIPIME, RIFAMPIN AND NITROFURANTOIN, BUT RESISTANT TO SULFONAMIDES. [address] [[id number]] Collected:  05/01/20 2230 Order Status:  Canceled Specimen:  Feces from Stool C. DIFFICILE AG & TOXIN A/B [[id number]] Order Status:  Canceled Specimen:  Stool ENTERIC BACTERIA PANEL, DNA [[id number]] Order Status:  Canceled Specimen:  Stool URINE CULTURE HOLD SAMPLE [[id number]] Order Status:  Canceled Specimen:  Urine Other pertinent lab: none Total time spent with patient: 28 Minutes I personally reviewed chart, notes, data and current medications in the medical record. I have personally examined and treated the patient at bedside during this period. Care Plan discussed with: Patient, Nursing Staff and >50% of time spent in counseling and coordination of care Discussed:  Care Plan Prophylaxis:  H2B/PPI Disposition:   PT, OT, RN 
        
___________________________________________________ Attending Physician: Yohana Grace MD

## 2020-05-06 NOTE — PROGRESS NOTES
Bedside and Verbal shift change report given to Natalee Stokes rn  (oncoming nurse) by Susana Gonzales  (offgoing nurse). Report included the following information SBAR and Kardex.

## 2020-05-06 NOTE — PROGRESS NOTES
5/5/2020   CARE MANAGEMENT NOTE:  CM reviewed EMR for clinical updates (pt off the floor on 5/5 2/2 surgery).  Pt was admitted with enteritis. Reportedly, pt resides with his dtr, Baljinder Wills ([phone]).   
RUR 17%; LOS 6 days 
  
Transition Plan of Care: 1. Pt is s/p surgery (cystoscopy and ex lap and end sigmoid colostomy) on 5/5 2. Per PT note dated 5/4, pt ambulated 50 feet and HH was recommended 3. Plan is for pt to return home with his dtr. 4.  Outpt f/u 5. Pt will arrange his own transportation home. CM will continue to follow pt for any further needs and until discharged. Araceli

## 2020-05-06 NOTE — PROGRESS NOTES
Trey Varghese [place] [address] Dasia Harris YOB: 1940 Assessment & Plan: VERONIQUE due to IVVD, improving Baseline Cr unknown, likely CKD Hypokalemia, better Acidosis Colon mass s/p diverting colostomy Rec: 
Now on TPN OK to stop IVF at your discretion Avoid nephrotoxins No need for RRT Subjective:  
CC: f/u VERONIQUE 
HPI: Creat continues to drift down ROS: no sob/n/v Current Facility-Administered Medications Medication Dose Route Frequency  TPN ADULT - CENTRAL AA 5% D20% W/ ELECTROLYTES AND CA   IntraVENous CONTINUOUS  
 dextrose 5% - 0.45% NaCl with KCl 40 mEq/L infusion   IntraVENous CONTINUOUS  
 HYDROmorphone (PF) 25 mg/50 mL (DILAUDID) PCA   IntraVENous TITRATE  fat emulsion 20% (LIPOSYN, INTRAlipid) infusion 500 mL  500 mL IntraVENous Q MON, WED & FRI  TPN ADULT - CENTRAL AA 5% D20% W/ ELECTROLYTES AND CA   IntraVENous CONTINUOUS  
 morphine injection 2 mg  2 mg IntraVENous Q3H PRN  
 sodium bicarbonate tablet 650 mg  650 mg Oral BID  alteplase (CATHFLO) 1 mg in sterile water (preservative free) 1 mL injection  1 mg InterCATHeter PRN  
 labetaloL (NORMODYNE) tablet 100 mg  100 mg Oral Q12H  
 heparin (porcine) injection 5,000 Units  5,000 Units SubCUTAneous Q8H  
 metroNIDAZOLE (FLAGYL) IVPB premix 500 mg  500 mg IntraVENous Q12H  
 benzocaine (HURRICANE) 20 % spray   Mucous Membrane PRN  
 sodium chloride (NS) flush 5-40 mL  5-40 mL IntraVENous Q8H  
 sodium chloride (NS) flush 5-40 mL  5-40 mL IntraVENous PRN  
 acetaminophen (TYLENOL) tablet 650 mg  650 mg Oral Q4H PRN  
 ondansetron (ZOFRAN) injection 4 mg  4 mg IntraVENous Q4H PRN  
 cefepime (MAXIPIME) 2 g in 0.9% sodium chloride (MBP/ADV) 100 mL  2 g IntraVENous Q12H  
 labetaloL (NORMODYNE;TRANDATE) 20 mg/4 mL (5 mg/mL) injection 10 mg  10 mg IntraVENous Q4H PRN  pantoprazole (PROTONIX) 40 mg in 0.9% sodium chloride 10 mL injection 40 mg IntraVENous DAILY Objective:  
 
Vitals: 
Blood pressure (!) 143/96, pulse 88, temperature 99 °F (37.2 °C), resp. rate 19, height 5' 8\" (1.727 m), weight 95.4 kg (210 lb 5.1 oz), SpO2 98 %. Temp (24hrs), Av.4 °F (36.9 °C), Min:97.7 °F (36.5 °C), Max:99.6 °F (37.6 °C) Intake and Output: 
701 - 1900 In: -  
Out: 430 [Drains:30] 
1901 - 700 In: 3796 [P.O.:30; I.V.:3766] Out: 0795 [Urine:2350; Drains:155] Physical Exam:              
GENERAL ASSESSMENT: NAD 
CHEST: CTA HEART: S1S2 ABDOMEN: soft NT 
EXTREMITY: no EDEMA 
 
   
ECG/rhythm: 
 
Data Review No results for input(s): TNIPOC in the last 72 hours. No lab exists for component: ITNL No results for input(s): CPK, CKMB, TROIQ in the last 72 hours. Recent Labs 20 
0408 20 
1349 20 
0236  142 143  
K 4.0 5.1 5.1 * 118* 118* CO2 17* 18* 17*  
BUN 22* 20 20 CREA 1.28 1.32* 1.33* * 114* 119* PHOS 2.5*  --  1.7* MG 1.3*  --  1.6 CA 7.0* 7.7* 8.0* ALB  --   --  2.5* WBC 10.8 16.8* 11.4* HGB 11.9* 14.3 12.7 HCT 35.9* 44.2 39.1 * 187 191 No results for input(s): INR, PTP, APTT, INREXT, INREXT in the last 72 hours. Needs: urine analysis, urine sodium, protein and creatinine Lab Results Component Value Date/Time Sodium,urine random 22 2020 11:32 AM  
 Creatinine, urine 280.00 2020 11:31 AM  
 
 
 
 
: Yelena Cano MD 
2020 [facility]: 
[email or web address] Hilda [place] office: 
[address] [place] [address], [address] Phone: [phone] Fax :     [phone] [place] office: 
[address] [facility], [address] Phone - [phone] Fax - [phone]

## 2020-05-06 NOTE — PROGRESS NOTES
Problem: Self Care Deficits Care Plan (Adult) Goal: *Acute Goals and Plan of Care (Insert Text) Description:  
FUNCTIONAL STATUS PRIOR TO ADMISSION: Pt poor historian; however, reports living with daughter in law, residing on first floor of two story house, with no MARILYN, stating he was Independent with ADLs without use of DME, standing to shower in walk-in shower. Reports he enjoys listening to all music but the \"hillbilly\" kind. HOME SUPPORT: The patient lived with daughter in law who provides PRN ADL/IADL assist. 
 
Occupational Therapy Goals Initiated 4/30/2020.   
1.  Patient will perform standing grooming with supervision within 7 day(s)- MET 5/1/20 and upgrade to mod I. 
2.  Patient will perform lower body dressing with supervision within 7 day(s)- MET 5/1/20 and upgrade to mod I. 
3.  Patient will perform bathing with supervision within 7 day(s)- MET 5/1/20 and upgrade to mod I. 
4.  Patient will perform toilet transfers with supervision within 7 day(s)- MET 5/1/20 and upgrade to mod I. 
5.  Patient will perform all aspects of toileting with supervision within 7 day(s)- MET 5/1/20 and upgrade to mod I. 
6.  Patient will participate in upper extremity therapeutic exercise/activities with supervision for 10 minutes within 7 day(s). 7.  Patient will utilize energy conservation techniques during functional activities with Min verbal cues within 7 day(s). 5/6/2020 1330 by Aristeo Aviles OT Outcome: Not Progressing Towards Goal 
  
OCCUPATIONAL THERAPY TREATMENT Patient: Maria Luisa Clayton (25 y.o. male) Date: 5/6/2020 Diagnosis: Enteritis [K52.9] <principal problem not specified> Procedure(s) (LRB): OPEN DIVERTING COLOSTOMY CREATION (N/A) 1 Day Post-Op Precautions: Contact, Fall Chart, occupational therapy assessment, plan of care, and goals were reviewed. ASSESSMENT Patient continues with skilled OT services and is not progressing towards goals, with decline in mobility/balance this date. Pt noted with impaired bed mobility and standing pivot turn transfer this date, with Max A for bed mobility and Moderate assist for sit to stand and standing pivoting at RW, with Max verbal single stepped commands for motor planning through pivoting. Pt with c/o abdominal pain throughout session; nursing aware and following. At end of session pt was being taken to CT scan. Pt benefited from Max verbal cues for re-direction and orientation this date. Continue skilled OT to address decreased strength/endurance, decreased mobility/balance, decreased activity tolerance and decreased problem solving/sequencing/safety awareness in an overall attempt at maximizing pt's highest level of safe functional independence prior to discharge from acute OT services. Current Level of Function Impacting Discharge (ADLs): Max A Other factors to consider for discharge: below reported baseline PLAN : 
Patient continues to benefit from skilled intervention to address the above impairments. Continue treatment per established plan of care. to address goals. Recommend with staff: EOB meals, BSC, Active engagement in ADLs Recommend next OT session: RW mobility/balance and standing ADLs Recommendation for discharge: (in order for the patient to meet his/her long term goals) Therapy 3 hours per day 5-7 days per week This discharge recommendation: 
Has not yet been discussed the attending provider and/or case management IF patient discharges home will need the following DME: TBD SUBJECTIVE:  
Patient stated Marisela sweeney in [place]? Is it 2000? Lisa Rodriguez  OBJECTIVE DATA SUMMARY:  
Cognitive/Behavioral Status: 
Neurologic State: Alert;Confused Orientation Level: Oriented to person;Disoriented to place; Disoriented to situation;Disoriented to time Cognition: Follows commands;Memory loss;Poor safety awareness Perception: Appears intact Perseveration: No perseveration noted Safety/Judgement: Decreased awareness of environment;Decreased awareness of need for assistance;Decreased awareness of need for safety;Decreased insight into deficits Functional Mobility and Transfers for ADLs: 
Bed Mobility: 
Rolling: Contact guard assistance Supine to Sit: Maximum assistance Transfers: 
Sit to Stand: Moderate assistance Bed to Chair: Moderate assistance Balance: 
Sitting: Impaired; Without support Sitting - Static: Good (unsupported); Fair (occasional) Sitting - Dynamic: Fair (occasional) Standing: Impaired; With support Standing - Static: Fair Standing - Dynamic : Fair;Poor(at RW) ADL Intervention: 
Cognitive Retraining Safety/Judgement: Decreased awareness of environment;Decreased awareness of need for assistance;Decreased awareness of need for safety;Decreased insight into deficits Patient instructed and indicated understanding the benefits of maintaining activity tolerance, functional mobility, and independence with self care tasks during acute stay  to ensure safe return home and to baseline. Encouraged patient to increase frequency and duration OOB, be out of bed for all meals, perform daily ADLs (as approved by RN/MD regarding bathing etc), and performing functional mobility to/from bathroom. Pt educated on safe transfer techniques, with specific emphasis on proper hand placement to push up from seated surface rather than attempt to pull self up, fully positioning self in-front of desired seated location, feeling chair on back of legs and reaching back with 1-2 UE to slowly lower self to seated position. Pain: 
8/10 c/o abdominal pain; nursing notified and following Activity Tolerance:  
Fair and requires frequent rest breaks Please refer to the flowsheet for vital signs taken during this treatment. After treatment patient left in no apparent distress:  
Supine on transport stretcher with nursing COMMUNICATION/COLLABORATION:  
The patients plan of care was discussed with: Physical therapist and Registered nurse. Denny Rabago OT Time Calculation: 26 mins

## 2020-05-06 NOTE — PROGRESS NOTES
Physical Therapy - Attempted PT earlier this am - patient getting assistance for ostomy and clean up. Spoke with OT and observed from the denson the patient attempting SPT with OT and transporter from bed > stretcher. Patient appeared to be having great difficulty motor planning and following cues. Spoke with OT there after and per discussion, patient with significant decline from performance with this PT on  5/3/20 during evaluation. Patient leaving unit for CT of abdomen. Medical has him NPO due to concerns of development of an obstruction. PT will follow up tomorrow per patient's medical status.

## 2020-05-07 NOTE — PROGRESS NOTES
[facility] Progress Note NAME: Angy Chau :  1940 MRM:  [id number] Date/Time of service 2020  9:44 AM    
 
  
Assessment and Plan:  
 
Rectal mass / Large bowel obstruction - POA, with CT showing SB and LB distention and fluid-filled loops. Rectal wall thickening concerning for cancer. Rectal carcinoma suspected by Flex Sig, Bx pending. No distant mets noted on CT.  GI, CRS and Oncology consulted. CRS did diverting colectomy , for palliative symptom control. CRS has cleared him to try clear fluid. Family of patient leaning towards no treatment and presumably hospice after that. Acute protein calorie malnutrition / Hypocalcemia / Hypoalbuminemia - Due to cancer, and NPO status post op. Patient getting TPN. Trial clear fluids today. Monitor and adjust lytes Enteritis / Leukocytosis - Continue cefepime and Flagyl for now, Cx negative. Can stop based on CRS periop recommendations.  
  
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 Renal consulted. CT showed circumferential bladder wall thickening most likely related to chronic outlet obstruction. UA consistent with UTI, alpha strep on Ur Cx. Completed enough cefepime for this issue.  
  
Dementia - Severe. Does not know he had surgery or why. Supportive care. At risk for delirium. Sitter prn 
  
HTN (hypertension) - Continue IV labetalol PRN Elevated troponin - POA, strain, due to renal insufficiency. TTE normal.  Appreciate cardiology evaluation. No further workup warranted 
  
  
Subjective: Chief Complaint:  Comfortable. Stool draining from ostomy, serosanguinous output from drain ROS: 
(bold if positive, if negative) Tolerating minimal PT  NPO Objective:  
 
Last 24hrs VS reviewed since prior progress note. Most recent are: 
 
Visit Vitals /73 (BP 1 Location: Left arm, BP Patient Position: At rest) Pulse 72 Temp 98.1 °F (36.7 °C) Resp 22 Ht 5' 8\" (1.727 m) Wt 93.8 kg (206 lb 12.7 oz) SpO2 98% BMI 31.44 kg/m² SpO2 Readings from Last 6 Encounters:  
05/07/20 98% Intake/Output Summary (Last 24 hours) at 5/7/2020 5271 Last data filed at 5/7/2020 8492 Gross per 24 hour Intake 1335.84 ml Output 3065 ml Net -1729.16 ml Physical Exam: 
 
Gen:  Frail, in no acute distress HEENT:  Pink conjunctivae, PERRL, hearing intact to voice, moist mucous membranes Neck:  Supple, without masses, thyroid non-tender Resp:  No accessory muscle use, clear breath sounds without wheezes rales or rhonchi 
Card:  No murmurs, normal S1, S2 without thrills, bruits or peripheral edema Abd:  Soft, non-tender, distended, reduced bowel sounds are present, no mass, ostomy Lymph:  No cervical or inguinal adenopathy Musc:  No cyanosis or clubbing Skin:  No rashes or ulcers, skin turgor is good Neuro:  Cranial nerves are grossly intact, general motor weakness, follows commands vaguely Psych:  Poor insight, oriented to person Telemetry reviewed:   normal sinus rhythm 
__________________________________________________________________ Medications Reviewed: (see below) Medications:  
 
Current Facility-Administered Medications Medication Dose Route Frequency  TPN ADULT - CENTRAL AA 5% D20% W/ ELECTROLYTES AND CA   IntraVENous CONTINUOUS  
 traMADoL (ULTRAM) tablet 50 mg  50 mg Oral Q6H PRN  
 HYDROmorphone (PF) (DILAUDID) injection 0.5 mg  0.5 mg IntraVENous Q3H PRN  
 TPN ADULT - CENTRAL AA 5% D20% W/ ELECTROLYTES AND CA   IntraVENous CONTINUOUS  
 fat emulsion 20% (LIPOSYN, INTRAlipid) infusion 500 mL  500 mL IntraVENous Q MON, WED & FRI  morphine injection 2 mg  2 mg IntraVENous Q3H PRN  
 sodium bicarbonate tablet 650 mg  650 mg Oral BID  alteplase (CATHFLO) 1 mg in sterile water (preservative free) 1 mL injection  1 mg InterCATHeter PRN  
 labetaloL (NORMODYNE) tablet 100 mg  100 mg Oral Q12H  heparin (porcine) injection 5,000 Units  5,000 Units SubCUTAneous Q8H  
 metroNIDAZOLE (FLAGYL) IVPB premix 500 mg  500 mg IntraVENous Q12H  
 benzocaine (HURRICANE) 20 % spray   Mucous Membrane PRN  
 sodium chloride (NS) flush 5-40 mL  5-40 mL IntraVENous Q8H  
 sodium chloride (NS) flush 5-40 mL  5-40 mL IntraVENous PRN  
 acetaminophen (TYLENOL) tablet 650 mg  650 mg Oral Q4H PRN  
 ondansetron (ZOFRAN) injection 4 mg  4 mg IntraVENous Q4H PRN  
 cefepime (MAXIPIME) 2 g in 0.9% sodium chloride (MBP/ADV) 100 mL  2 g IntraVENous Q12H  
 labetaloL (NORMODYNE;TRANDATE) 20 mg/4 mL (5 mg/mL) injection 10 mg  10 mg IntraVENous Q4H PRN  pantoprazole (PROTONIX) 40 mg in 0.9% sodium chloride 10 mL injection  40 mg IntraVENous DAILY Lab Data Reviewed: (see below) Lab Review:  
 
Recent Labs 05/07/20 
0636 05/06/20 
0408 05/05/20 
1349 WBC 12.2* 10.8 16.8* HGB 10.7* 11.9* 14.3 HCT 31.9* 35.9* 44.2 * 144* 187 Recent Labs 05/07/20 
0830 05/06/20 
0408 05/05/20 
1349 05/05/20 
0236  143 142 143  
K 3.4* 4.0 5.1 5.1 * 118* 118* 118* CO2 17* 17* 18* 17* GLU 91 129* 114* 119* BUN 24* 22* 20 20 CREA 1.15 1.28 1.32* 1.33* CA 6.7* 7.0* 7.7* 8.0*  
MG 1.9 1.3*  --  1.6 PHOS 2.4* 2.5*  --  1.7* ALB 1.6*  --   --  2.5* TBILI 0.2  --   --  0.6 SGOT 14*  --   --  18 ALT 9*  --   --  18 No results found for: [facility] No results for input(s): PH, PCO2, PO2, HCO3, FIO2 in the last 72 hours. No results for input(s): INR, INREXT, INREXT in the last 72 hours. All Micro Results Procedure Component Value Units Date/Time CULTURE, BLOOD [[id number]] Collected:  04/30/20 0140 Order Status:  Completed Specimen:  Blood Updated:  05/06/20 0447 Special Requests: NO SPECIAL REQUESTS Culture result: NO GROWTH 6 DAYS     
 CULTURE, URINE [[id number]]  (Abnormal) Collected:  04/30/20 1138 Order Status:  Completed Specimen:  Urine Updated:  05/03/20 1531 Special Requests: --     
  NO SPECIAL REQUESTS Reflexed from [id number] [place] Count --     
  >100,000 COLONIES/mL Culture result:    
  AEROCOCCUS URCHUCK A. MARIA ELENA HAS BEEN DESCRIBED AS SUSCEPTIBLE TO PENICILLIN, AMOXICILLIN, PIPERACILLIN, CEFIPIME, RIFAMPIN AND NITROFURANTOIN, BUT RESISTANT TO SULFONAMIDES. [address] [[id number]] Collected:  05/01/20 2230 Order Status:  Canceled Specimen:  Feces from Stool C. DIFFICILE AG & TOXIN A/B [[id number]] Order Status:  Canceled Specimen:  Stool ENTERIC BACTERIA PANEL, DNA [[id number]] Order Status:  Canceled Specimen:  Stool URINE CULTURE HOLD SAMPLE [[id number]] Order Status:  Canceled Specimen:  Urine Other pertinent lab: none Total time spent with patient: 28 Minutes I personally reviewed chart, notes, data and current medications in the medical record. I have personally examined and treated the patient at bedside during this period. Care Plan discussed with: Patient, Nursing Staff and >50% of time spent in counseling and coordination of care Discussed:  Care Plan Prophylaxis:  H2B/PPI Disposition:   PT, OT, RN 
        
___________________________________________________ Attending Physician: Debbie Whitfield MD

## 2020-05-07 NOTE — PROGRESS NOTES
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 
Cont TPN Avoid nephrotoxins No need for RRT Subjective:  
CC: f/u VERONIQUE 
HPI: Renal function stable. Advancing diet ROS: no sob/n/v Current Facility-Administered Medications Medication Dose Route Frequency  TPN ADULT - CENTRAL AA 5% D20% W/ ELECTROLYTES AND CA   IntraVENous CONTINUOUS  
 traMADoL (ULTRAM) tablet 50 mg  50 mg Oral Q6H PRN  
 HYDROmorphone (PF) (DILAUDID) injection 0.5 mg  0.5 mg IntraVENous Q3H PRN  
 TPN ADULT - CENTRAL AA 5% D20% W/ ELECTROLYTES AND CA   IntraVENous CONTINUOUS  
 fat emulsion 20% (LIPOSYN, INTRAlipid) infusion 500 mL  500 mL IntraVENous Q MON, WED & FRI  morphine injection 2 mg  2 mg IntraVENous Q3H PRN  
 sodium bicarbonate tablet 650 mg  650 mg Oral BID  alteplase (CATHFLO) 1 mg in sterile water (preservative free) 1 mL injection  1 mg InterCATHeter PRN  
 labetaloL (NORMODYNE) tablet 100 mg  100 mg Oral Q12H  
 heparin (porcine) injection 5,000 Units  5,000 Units SubCUTAneous Q8H  
 metroNIDAZOLE (FLAGYL) IVPB premix 500 mg  500 mg IntraVENous Q12H  
 benzocaine (HURRICANE) 20 % spray   Mucous Membrane PRN  
 sodium chloride (NS) flush 5-40 mL  5-40 mL IntraVENous Q8H  
 sodium chloride (NS) flush 5-40 mL  5-40 mL IntraVENous PRN  
 acetaminophen (TYLENOL) tablet 650 mg  650 mg Oral Q4H PRN  
 ondansetron (ZOFRAN) injection 4 mg  4 mg IntraVENous Q4H PRN  
 cefepime (MAXIPIME) 2 g in 0.9% sodium chloride (MBP/ADV) 100 mL  2 g IntraVENous Q12H  
 labetaloL (NORMODYNE;TRANDATE) 20 mg/4 mL (5 mg/mL) injection 10 mg  10 mg IntraVENous Q4H PRN  pantoprazole (PROTONIX) 40 mg in 0.9% sodium chloride 10 mL injection  40 mg IntraVENous DAILY Objective:  
 
Vitals: Blood pressure 126/73, pulse 72, temperature 98.1 °F (36.7 °C), resp. rate 22, height 5' 8\" (1.727 m), weight 93.8 kg (206 lb 12.7 oz), SpO2 98 %. Temp (24hrs), Av.6 °F (37 °C), Min:97.5 °F (36.4 °C), Max:100 °F (37.8 °C) Intake and Output: 
701 - 1900 In: 119.2 [I.V.:119.2] Out: 210 [Urine:150; Drains:60] 
1901 -  0700 In: 2982.7 [I.V.:2982.7] Out: 7666 [Urine:2725; Drains:200] Physical Exam:              
GENERAL ASSESSMENT: NAD 
CHEST: CTA HEART: S1S2 ABDOMEN: soft NT 
: +lopez +urine EXTREMITY: no EDEMA 
 
   
ECG/rhythm: 
 
Data Review No results for input(s): TNIPOC in the last 72 hours. No lab exists for component: ITNL No results for input(s): CPK, CKMB, TROIQ in the last 72 hours. Recent Labs 20 
4277 20 
0408 20 
1349 20 
0236 NA  --  143 142 143 K  --  4.0 5.1 5.1 CL  --  118* 118* 118* CO2  --  17* 18* 17* BUN  --  22* 20 20 CREA  --  1.28 1.32* 1.33* GLU  --  129* 114* 119* PHOS  --  2.5*  --  1.7* MG  --  1.3*  --  1.6 CA  --  7.0* 7.7* 8.0* ALB  --   --   --  2.5* WBC 12.2* 10.8 16.8* 11.4* HGB 10.7* 11.9* 14.3 12.7 HCT 31.9* 35.9* 44.2 39.1 * 144* 187 191 No results for input(s): INR, PTP, APTT, INREXT, INREXT in the last 72 hours. Needs: urine analysis, urine sodium, protein and creatinine Lab Results Component Value Date/Time Sodium,urine random 22 2020 11:32 AM  
 Creatinine, urine 280.00 2020 11:31 AM  
 
 
 
 
: Lisette Ronquillo MD 
2020 [facility]: 
[email or web address] [employer] [email or web address] [place] office: 
[address], [facility] [address] [place], [address] [place] [address] Phone: [phone] Fax :     [phone] [place] office: 
[address] [facility], [address] Phone - [phone] Fax - [phone]

## 2020-05-07 NOTE — PROGRESS NOTES
CRS 
Pt c/o dry mouth Flowsheet, labs reviewed Abd: soft, nt, nd 
Colostomy: stool in bag Mucous fistula: small amount of stool Drain: serosang Plan: 
Cont tpn. Pt has not been getting oob. Staging CT yesterday without evidence of distant mets. CEA not elevated. At worst, pt has stage 3 disease. See where plans are for hospice- but he does have potentially curable disease. Ok for clear liquids.  SB, colon not dilated on staging CT

## 2020-05-07 NOTE — PROGRESS NOTES
5/7/2020   CARE MANAGEMENT NOTE:  CM reviewed EMR for clinical updates (pt off the floor on 5/5 2/2 surgery).  Pt was admitted with enteritis. Reportedly, pt resides with his dtr, Austin Nixon ([phone]).   
RUR 21%; LOS 7 days 
  
Transition Plan of Care: 1. Pt is s/p surgery (cystoscopy and ex lap and end sigmoid colostomy) on 5/5 2. Per PT note dated 5/4, pt ambulated 50 feet and HH was recommended 3. Plan is for pt to return home with his dtr. 4.  Outpt f/u 5. Pt will arrange his own transportation home. 
  
CM will continue to follow pt for any further needs and until discharged. Araceli

## 2020-05-07 NOTE — PROGRESS NOTES
1537: Bedside shift change report given to Jaylene Snyder RN (oncoming nurse) by Aristides Ramírez RN (offgoing nurse). Report included the following information SBAR, Kardex and Cardiac Rhythm NSR.

## 2020-05-07 NOTE — PROGRESS NOTES
Nutrition Assessment: 
 
RECOMMENDATIONS/INTERVENTION(S):  
1. Continue TPN at goal until pt able to meet EENs through po intake: 
 
TPN (D20/AA5) @ 75 mL/hr Lipids - 20% @ 42 mL/hr x 12 hrs, 3 times per week Goal provides 2016 kcal and 90 g Pro - meeting 100% of kcal and protein needs. Check triglycerides prior to first lipid administration. Check triglycerides weekly while on lipids. 2. Continue clear liquid diet, advance as medically feasible to goal of GI lite. 3. Continue to monitor intakes, wt changes, labs, GI, plan of care. ASSESSMENT:  
5/7: F/u. Charting remotely. Pt s/p diverting colostomy 5/5/. Now advanced to CLD this AM. Remains on TPN at goal. Per CRS, pt's ostomy is function with stool in bag. Pt noted to have dementia, will provide new ostomy diet education to pt/family upon f/u or when diet is advanced to solids. Will continue to monitor intakes/diet advancement. Labs- K 3.4, Ca 6.7, phos 2.4. Meds reviewed. 5/4: RD consult received for TPN recommendations. Pt admitted with enteritis. PMH includes HTN, CKD, dementia. BMI 28.9, c/w overweight. PT currently NPO. Per MD note, pt with rectal mass, awaiting bx. CRS planning for diverting colectomy tomorrow morning (5/5). Per CRS, pt has had little to no po intake x2 weeks and anticipate several days before being able to take in any PO. Palliative following, family/pt waiting for pathology before making a final decision on goals of care. No recent weight hx in chart. TPN recs provided. Phos low, recommend repletion. Will monitor need for TPN adjustments. Labs- phos 1.9, Ca 7.7. Meds- cefepime, flagyl, Zofran, Protonix, [email or web address], TPN. Diet Order: Clear liquids 
% Eaten:   
No data found. Pertinent Medications: [x] Reviewed Labs: [x] Reviewed Anthropometrics: Height: 5' 8\" (172.7 cm) Weight: 93.8 kg (206 lb 12.7 oz)  IBW (%IBW):   ( ) UBW (%UBW):   (  %)   
 
 BMI: Body mass index is 31.44 kg/m². This BMI is indicative of: 
 [] Underweight    [] Normal    [x] Overweight    []  Obesity    []  Extreme Obesity (BMI>40) Estimated Nutrition Needs (Based on): 2024 Kcals/day(1557 x 1.3 AF) , 86 g(1-1.2) Protein Carbohydrate: At Least 130 g/day  Fluids: 2024 mL/day (1 ml/kcal) Last BM: 5/6   []Active     []Hyperactive  [x]Hypoactive       [] Absent   BS Skin:    [] Intact   [] Incision  [] Breakdown   [] DTI   [] Tears/Excoriation/Abrasion  []Edema [] Other: Wt Readings from Last 30 Encounters:  
05/07/20 93.8 kg (206 lb 12.7 oz) NUTRITION DIAGNOSES:  
Problem:  Altered GI function Etiology: related to alteration in GI structure/function Signs/Symptoms: as evidenced by rectal mass/large bowel obstruction per MD, pt NPO, need for TPN    
 
5/7: Nutrition dx continues. NUTRITION INTERVENTIONS: 
Initiate parenteral nutrition, General, healthful diet GOAL:  
TPN and CLD meeting estimated energy/protein needs next 2-4 days Cultural, Presybeterian, or Ethnic Dietary Needs: None EDUCATION & DISCHARGE NEEDS:  
 [x] None Identified 
 [] Identified and Education Provided/Documented 
 [] Identified and Pt declined/was not appropriate 
  
 [] Interdisciplinary Care Plan Reviewed/Documented  
 [x] Discharge Needs: TBD- on TPN, goals of care unknown  
 [] No Nutrition Related Discharge Needs NUTRITION RISK:  
Pt Is At Nutrition Risk  [x] No Nutrition Risk Identified  [] PT SEEN FOR:  
 []  MD Consult: []Calorie Count []Diabetic Diet Education []Diet Education []Electrolyte Management []General Nutrition Management and Supplements []Management of Tube Feeding []TPN Recommendations []  RN Referral:  []MST score >=2 
   []Enteral/Parenteral Nutrition PTA []Pregnant: Gestational DM or Multigestation  
              [] Pressure Ulcer []  Low BMI      []  Length of Stay       [] Dysphagia Diet         [] Ventilator 
[]  Follow-up Previous Recommendations: 
 [x] Implemented          [] Not Implemented          [] Not Applicable Previous Goal: 
 [] Met              [x] Progressing Towards Goal              [] Not Progressing Towards Goal   [] Not Applicable Norman Sherman RDN Pager [phone] Phone [phone]

## 2020-05-07 NOTE — PROGRESS NOTES
Problem: Mobility Impaired (Adult and Pediatric) Goal: *Acute Goals and Plan of Care (Insert Text) Description: FUNCTIONAL STATUS PRIOR TO ADMISSION: Patient was modified independent for functional mobility and ADLs per patient, still drives. HOME SUPPORT PRIOR TO ADMISSION: The patient lived with family members but did not require assist. 
 
Physical Therapy Goals Initiated 5/1/2020 1. Patient will move from supine to sit and sit to supine , scoot up and down and roll side to side in bed with independence within 7 day(s). 2.  Patient will transfer from bed to chair and chair to bed with independence using the least restrictive device within 7 day(s). 3.  Patient will perform sit <> stand with independence within 7 day(s). 4.  Patient will ambulate with independence for 150 feet with the least restrictive device within 7 day(s). Note: PHYSICAL THERAPY TREATMENT Patient: Holden Cain (67 y.o. male) Date: 5/7/2020 Diagnosis: Enteritis [K52.9] <principal problem not specified> Procedure(s) (LRB): OPEN DIVERTING COLOSTOMY CREATION (N/A) 2 Days Post-Op Precautions: Contact, Fall Chart, physical therapy assessment, plan of care and goals were reviewed. ASSESSMENT Patient continues with skilled PT services. Pt moves slowly. Pt comes to sit with min to mod  assist of 2. Pt to stand with min assist.Pt ambulated 10ft with RW min assist of 2. Pt with loose stool and was sat on commode before being cleaned up and transferred to a chair min assist of 2 with  RW. Pt progressing slowly. Continue goals. Current Level of Function Impacting Discharge (mobility/balance): Pt min assist of 2 for bed mobility. PLAN : 
Patient continues to benefit from skilled intervention to address the above impairments. Continue treatment per established plan of care. to address goals. Recommendation for discharge: (in order for the patient to meet his/her long term goals) Physical therapy at least 2 days/week in the home This discharge recommendation: 
Has been made in collaboration with the attending provider and/or case management IF patient discharges home will need the following DME: rolling walker SUBJECTIVE:  
 
 
OBJECTIVE DATA SUMMARY:  
Critical Behavior: 
Neurologic State: Alert Orientation Level: Oriented to person, Oriented to place, Oriented to situation Cognition: Follows commands Safety/Judgement: Decreased awareness of environment, Decreased awareness of need for assistance, Decreased awareness of need for safety, Decreased insight into deficits Functional Mobility Training: 
Bed Mobility: 
  
Supine to Sit: Moderate assistance;Maximum assistance;Assist x2 Transfers: 
Sit to Stand: Minimum assistance Stand to Sit: Minimum assistance Balance: 
Sitting - Static: Fair (occasional) Standing: With support Standing - Static: Fair Ambulation/Gait Training: 
Distance (ft): 10 Feet (ft) Assistive Device: Gait belt;Walker, rolling Ambulation - Level of Assistance: Minimal assistance;Assist x2 Gait Abnormalities: Decreased step clearance Activity Tolerance:  
Fair Please refer to the flowsheet for vital signs taken during this treatment. After treatment patient left in no apparent distress:  
Sitting in chair COMMUNICATION/COLLABORATION:  
The patients plan of care was discussed with: Physical therapist.  
 
Trell Mclean PTA Time Calculation: 23 mins

## 2020-05-07 NOTE — PROGRESS NOTES
Problem: Self Care Deficits Care Plan (Adult) Goal: *Acute Goals and Plan of Care (Insert Text) Description:  
FUNCTIONAL STATUS PRIOR TO ADMISSION: Pt poor historian; however, reports living with daughter in law, residing on first floor of two story house, with no MARILYN, stating he was Independent with ADLs without use of DME, standing to shower in walk-in shower. Reports he enjoys listening to all music but the \"hillbilly\" kind. HOME SUPPORT: The patient lived with daughter in law who provides PRN ADL/IADL assist. 
 
Occupational Therapy Goals Weekly re-assessment 5/7/2020, patient with decline in function, therefore goals updated below: 1. Patient will perform grooming seated EOB with supervision within 7 day(s). 2.  Patient will perform lower body dressing with supervision using adaptive strategies PRN within 7 day(s). 3.  Patient will perform bathing with minimal assistance within 7 day(s). 4.  Patient will perform toilet transfers with supervision within 7 day(s). 5.  Patient will participate in upper extremity therapeutic exercise/activities with supervision for 5 minutes within 7 day(s). 6.  Patient will utilize energy conservation techniques during functional activities with Min verbal cues within 7 day(s).  
 
Initiated 4/30/2020.   
1.  Patient will perform standing grooming with supervision within 7 day(s)- MET 5/1/20 and upgrade to mod I. 
2.  Patient will perform lower body dressing with supervision within 7 day(s)- MET 5/1/20 and upgrade to mod I. 
3.  Patient will perform bathing with supervision within 7 day(s)- MET 5/1/20 and upgrade to mod I. 
4.  Patient will perform toilet transfers with supervision within 7 day(s)- MET 5/1/20 and upgrade to mod I. 
5.  Patient will perform all aspects of toileting with supervision within 7 day(s)- MET 5/1/20 and upgrade to mod I. 
6.  Patient will participate in upper extremity therapeutic exercise/activities with supervision for 10 minutes within 7 day(s). 7.  Patient will utilize energy conservation techniques during functional activities with Min verbal cues within 7 day(s). Outcome: Progressing Towards Goal 
  
OCCUPATIONAL THERAPY TREATMENT/WEEKLY RE-ASSESSMENT Patient: Milvia Miller (35 y.o. male) Date: 5/7/2020 Diagnosis: Enteritis [K52.9] <principal problem not specified> Procedure(s) (LRB): OPEN DIVERTING COLOSTOMY CREATION (N/A) 2 Days Post-Op Precautions: Contact, Fall Chart, occupational therapy assessment, plan of care, and goals were reviewed. ASSESSMENT Patient continues with skilled OT services and is progressing towards goals. Patient with decline in function since colostomy creation 2 days ago, now requiring up to min to mod A for transfers to [facility] (for hygiene). Pt with difficulty with motor planning and sequencing steps and using rolling walker during functional transfers. Once up in chair, pt able to perform seated tasks with set up to min A, including self-feeding. Due to decline in function, goals downgraded. Will continue to follow per POC of 5x/week. Pending progress, goal to return home with follow up [facility] and family support. Current Level of Function Impacting Discharge (ADLs): mod A toileting; set up to min A seated grooming tasks; min to mod A transfers to [facility] Other factors to consider for discharge: recent colostomy; confusion; poor sequencing PLAN : 
Goals have been updated based on progression since last assessment. Patient continues to benefit from skilled intervention to address the above impairments. Continue to follow patient 5 times a week to address goals. Recommend with staff: 2 person assist SPT to [facility] for toileting; OOB to chair for meals; ADLs as able Recommend next OT session: continue with ADL participation; standing tolerance Recommendation for discharge: (in order for the patient to meet his/her long term goals) Occupational therapy at least 2 days/week in the home AND ensure assist and/or supervision for safety with all mobility and ADLs This discharge recommendation: 
Has not yet been discussed the attending provider and/or case management IF patient discharges home will need the following DME: walker: rolling and ?BSC SUBJECTIVE:  
Patient stated I still have the bag, right?  OBJECTIVE DATA SUMMARY:  
Cognitive/Behavioral Status: 
Neurologic State: Alert Orientation Level: Oriented to person;Disoriented to place; Disoriented to time Cognition: Memory loss; Follows commands Perception: Appears intact Perseveration: No perseveration noted Safety/Judgement: Decreased awareness of environment;Decreased awareness of need for assistance; Fall prevention;Decreased insight into deficits Functional Mobility and Transfers for ADLs: 
Bed Mobility: 
Supine to Sit: Moderate assistance;Maximum assistance;Assist x2 Sit to Supine: (Pt seated OOB in chair at end of session) Transfers: 
Sit to Stand: Minimum assistance Functional Transfers Toilet Transfer : Minimum assistance; Moderate assistance; Additional time Adaptive Equipment: Bedside commode;Walker (comment) Balance: 
Sitting: Impaired; Without support Sitting - Static: Fair (occasional) Standing: With support; Impaired Standing - Static: Fair ADL Intervention: Toileting Bowel Hygiene: Maximum assistance(pt requiring bilateral UE support at RW during standign hygi) Clothing Management: Moderate assistance Cues: Verbal cues provided; Tactile cues provided(physical assist for hygiene) Cognitive Retraining Orientation Retraining: Reorienting;Place;Situation;Time 
Problem Solving: Awareness of environment Safety/Judgement: Decreased awareness of environment;Decreased awareness of need for assistance; Fall prevention;Decreased insight into deficits Instructed patient to increase time sitting OOB in chair for pulmonary hygiene, skin integrity, and to increase endurance in preparation for ADLs. Instructed patient to sit OOB for all meals. Patient verbalized understanding of same. Functional Outcome Measure:  
Barthel Index: 
 
Bathin Bladder: 0(lopez) Bowels: 0(colostomy) Groomin Dressin Feeding: 10 Mobility: 0 Stairs: 0 Toilet Use: 5 Transfer (Bed to Chair and Back): 10 Total: 35/100 The Barthel ADL Index: Guidelines 1. The index should be used as a record of what a patient does, not as a record of what a patient could do. 2. The main aim is to establish degree of independence from any help, physical or verbal, however minor and for whatever reason. 3. The need for supervision renders the patient not independent. 4. A patient's performance should be established using the best available evidence. Asking the patient, friends/relatives and nurses are the usual sources, but direct observation and common sense are also important. However direct testing is not needed. 5. Usually the patient's performance over the preceding 24-48 hours is important, but occasionally longer periods will be relevant. 6. Middle categories imply that the patient supplies over 50 per cent of the effort. 7. Use of aids to be independent is allowed. Grace New., Barthel, D.W. (0544). Functional evaluation: the Barthel Index. [address] [facility] (14)2. RAMÓN Bautista, Alex Palomino., Javier Crawford., [place], [address] [place] [address] (). Measuring the change indisability after inpatient rehabilitation; comparison of the responsiveness of the Barthel Index and Functional Johnson Measure. Journal of Neurology, Neurosurgery, and Psychiatry, 66(4), 968-237. Maye Willis, N.J.A, EVERETT Tee, & Jessy Epps MGiselA. (2004.) Assessment of post-stroke quality of life in cost-effectiveness studies: The usefulness of the Barthel Index and the EuroQoL-5D. [facility], 13, 183-10 Pain: Pt reporting moderate abdominal pain Activity Tolerance:  
Fair, SpO2 stable on RA and requires rest breaks Please refer to the flowsheet for vital signs taken during this treatment. After treatment patient left in no apparent distress:  
Sitting in chair, Call bell within reach and wound care RN present COMMUNICATION/COLLABORATION:  
The patients plan of care was discussed with: Physical therapist and Registered nurse. Mukesh Cristobal, OT Time Calculation: 30 mins

## 2020-05-07 NOTE — PROGRESS NOTES
Bedside shift change report given to Karla Fragoso RN (oncoming nurse) by Alexia Douglas RN (offgoing nurse). Report included the following information SBAR, Kardex, Procedure Summary, Intake/Output, MAR and Recent Results.

## 2020-05-07 NOTE — ROUTINE PROCESS
Bedside and Verbal shift change report given to Alonzo Sheffield (oncoming nurse) by Cesar Tristan (offgoing nurse). Report included the following information SBAR, Kardex, Intake/Output, MAR, Accordion, Recent Results, Med Rec Status, Quality Measures and Dual Neuro Assessment.

## 2020-05-07 NOTE — PROGRESS NOTES
Problem: Risk for Spread of Infection Goal: Prevent transmission of infectious organism to others Description: Prevent the transmission of infectious organisms to other patients, staff members, and visitors. Outcome: Progressing Towards Goal 
  
Problem: Patient Education:  Go to Education Activity Goal: Patient/Family Education Outcome: Progressing Towards Goal 
  
Problem: Falls - Risk of 
Goal: *Absence of Falls Description: Document Stephane Cornell Fall Risk and appropriate interventions in the flowsheet. Outcome: Progressing Towards Goal 
Note: Fall Risk Interventions: 
Mobility Interventions: Bed/chair exit alarm, OT consult for ADLs, Patient to call before getting OOB, PT Consult for mobility concerns, PT Consult for assist device competence, Utilize walker, cane, or other assistive device Mentation Interventions: Adequate sleep, hydration, pain control, Bed/chair exit alarm, Door open when patient unattended, Family/sitter at bedside, Room close to nurse's station Medication Interventions: Bed/chair exit alarm, Patient to call before getting OOB, Teach patient to arise slowly, Utilize gait belt for transfers/ambulation Elimination Interventions: Bed/chair exit alarm, Call light in reach History of Falls Interventions: Bed/chair exit alarm, Room close to nurse's station Problem: Patient Education: Go to Patient Education Activity Goal: Patient/Family Education Outcome: Progressing Towards Goal 
  
Problem: Pressure Injury - Risk of 
Goal: *Prevention of pressure injury Description: Document Baltazar Scale and appropriate interventions in the flowsheet.  
Outcome: Progressing Towards Goal 
Note: Pressure Injury Interventions: 
Sensory Interventions: Assess changes in LOC, Check visual cues for pain, Discuss PT/OT consult with provider, Keep linens dry and wrinkle-free, Maintain/enhance activity level, Minimize linen layers, Turn and reposition approx. every two hours (pillows and wedges if needed) Moisture Interventions: Absorbent underpads, Minimize layers Activity Interventions: Increase time out of bed, PT/OT evaluation Mobility Interventions: PT/OT evaluation, Turn and reposition approx. every two hours(pillow and wedges) Nutrition Interventions: Document food/fluid/supplement intake Friction and Shear Interventions: Minimize layers, HOB 30 degrees or less Problem: Patient Education: Go to Patient Education Activity Goal: Patient/Family Education Outcome: Progressing Towards Goal 
  
Problem: Patient Education: Go to Patient Education Activity Goal: Patient/Family Education Outcome: Progressing Towards Goal 
  
Problem: Patient Education: Go to Patient Education Activity Goal: Patient/Family Education Outcome: Progressing Towards Goal

## 2020-05-07 NOTE — PROGRESS NOTES
[address] [facility] at [facility] [phone] Hematology / Oncology Follow up Reason for Visit:  
Apolonia Hoang is a 67 y.o. male who is seen in consultation at the request of Dr. Dougie Perez for evaluation of probable rectal cancer. History of Present Illness:  
Apolonia Hoang is a 67 y.o. male with mild dementia admitted with n/v, found to have rectal mass. He was admitted 4/30/20 after 10 days of n/v/d refractory to trial of antiemetic at home. Imaging showed small and large bowel distention as well as possible enteritis. Admission labs notable for WBC 17, Cr 2.24. Blood cx negative, but urine culture growing apparent alpha streptococcus. Pt currently on Cefepime and Flagyl. Pt evaluated by Dr. Wolf Obrien in GI, found to have firm nodular mass in rectum. Per patient's step daughter, patient has been weak for the past 2 weeks. However, prior to that he has been active with dressing himself, taking walks in the neighborhood. Pt did have prior colonoscopy approx 10 yrs ago at [facility] thinks her mother might have mentioned that pt had colon cancer in the past, but she is unsure. In further speaking with step-daughter, she states she is patient's POA. She states his short-term memory has been quite poor and often cannot remember whether he ate that day or not. She does not feel that he would want aggressive treatment measures. Interval History:  
 
Remains alert; pleasant. Very talkative today. Does not remember surgery or reason for admission. Unaware that he had a colostomy bag. Denies pain or SOB. States was up in the chair earlier today. States he does not want to be a burden on his step-daughter. Has a good relationship with his step daughter and her 3 kids. Believes signed formal paperwork to make [address] but not sure where the papers would be. Past Medical History:  
Diagnosis Date  History of vascular access device 05/04/2020 [facility] VAT 5Fr Triple PICC Right brachial 43cm for TPN Past Surgical History:  
Procedure Laterality Date 2021 Dee Becerra N/A 5/1/2020 SIGMOIDOSCOPY FLEXIBLE performed by Rosy Benitez MD at [address] [place] [address] Social History Tobacco Use  Smoking status: Former Smoker  Smokeless tobacco: Never Used Substance Use Topics  Alcohol use: Not on file History reviewed. No pertinent family history. Current Facility-Administered Medications Medication Dose Route Frequency  TPN ADULT - CENTRAL AA 5% D20% W/ ELECTROLYTES AND CA   IntraVENous CONTINUOUS  
 traMADoL (ULTRAM) tablet 50 mg  50 mg Oral Q6H PRN  
 HYDROmorphone (PF) (DILAUDID) injection 0.5 mg  0.5 mg IntraVENous Q3H PRN  
 glucose chewable tablet 16 g  4 Tab Oral PRN  
 glucagon (GLUCAGEN) injection 1 mg  1 mg IntraMUSCular PRN  
 dextrose 10% infusion 0-250 mL  0-250 mL IntraVENous PRN  
 insulin lispro (HUMALOG) injection   SubCUTAneous Q6H  
 TPN ADULT - CENTRAL AA 5% D20% W/ ELECTROLYTES AND CA   IntraVENous CONTINUOUS  
 fat emulsion 20% (LIPOSYN, INTRAlipid) infusion 500 mL  500 mL IntraVENous Q MON, WED & FRI  morphine injection 2 mg  2 mg IntraVENous Q3H PRN  
 sodium bicarbonate tablet 650 mg  650 mg Oral BID  alteplase (CATHFLO) 1 mg in sterile water (preservative free) 1 mL injection  1 mg InterCATHeter PRN  
 labetaloL (NORMODYNE) tablet 100 mg  100 mg Oral Q12H  
 heparin (porcine) injection 5,000 Units  5,000 Units SubCUTAneous Q8H  
 metroNIDAZOLE (FLAGYL) IVPB premix 500 mg  500 mg IntraVENous Q12H  
 benzocaine (HURRICANE) 20 % spray   Mucous Membrane PRN  
 sodium chloride (NS) flush 5-40 mL  5-40 mL IntraVENous Q8H  
 sodium chloride (NS) flush 5-40 mL  5-40 mL IntraVENous PRN  
 acetaminophen (TYLENOL) tablet 650 mg  650 mg Oral Q4H PRN  
 ondansetron (ZOFRAN) injection 4 mg  4 mg IntraVENous Q4H PRN  
 cefepime (MAXIPIME) 2 g in 0.9% sodium chloride (MBP/ADV) 100 mL  2 g IntraVENous Q12H  
 labetaloL (NORMODYNE;TRANDATE) 20 mg/4 mL (5 mg/mL) injection 10 mg  10 mg IntraVENous Q4H PRN  pantoprazole (PROTONIX) 40 mg in 0.9% sodium chloride 10 mL injection  40 mg IntraVENous DAILY No Known Allergies Review of Systems: A complete review of systems was obtained, negative except as described above. Physical Exam:  
 
Visit Vitals /68 (BP 1 Location: Left arm, BP Patient Position: Sitting) Pulse 65 Temp 98.1 °F (36.7 °C) Resp 21 Ht 5' 8\" (1.727 m) Wt 93.8 kg (206 lb 12.7 oz) SpO2 99% BMI 31.44 kg/m² ECOG PS: 3 General: No distress Eyes:  anicteric sclerae HENT: Atraumatic with normal appearance of ears and nose; OP clear Neck: Supple; no thyromegaly Respiratory: anteriorly CTAB, normal respiratory effort CV: Normal rate, regular rhythm, no murmurs, no peripheral edema GI: ostomy noted;slight tenderness; Soft , no hepatomegaly, no splenomegaly: NOEMÍ x 1 noted : lopez noted MS: Digits without clubbing or cyanosis. Skin: No rashes, ecchymoses, or petechiae. Normal temperature, turgor, and texture. Neuro/Psych: Alert, oriented to person, place, year but not month. Appropriate affect, questionable memory,  Poor judgment/insight Results:  
 
Lab Results Component Value Date/Time WBC 12.2 (H) 05/07/2020 06:36 AM  
 HGB 10.7 (L) 05/07/2020 06:36 AM  
 HCT 31.9 (L) 05/07/2020 06:36 AM  
 PLATELET 608 (L) 88/98/9582 06:36 AM  
 MCV 90.1 05/07/2020 06:36 AM  
 ABS. NEUTROPHILS 10.9 (H) 05/07/2020 06:36 AM  
 
Lab Results Component Value Date/Time  Sodium 140 05/07/2020 08:30 AM  
 Potassium 3.4 (L) 05/07/2020 08:30 AM  
 Chloride 115 (H) 05/07/2020 08:30 AM  
 CO2 17 (L) 05/07/2020 08:30 AM  
 Glucose 91 05/07/2020 08:30 AM  
 BUN 24 (H) 05/07/2020 08:30 AM  
 Creatinine 1.15 05/07/2020 08:30 AM  
 GFR est AA >60 05/07/2020 08:30 AM  
 GFR est non-AA >60 05/07/2020 08:30 AM  
 Calcium 6.7 (L) 05/07/2020 08:30 AM  
 Glucose (POC) 102 (H) 2020 12:00 PM  
 
Lab Results Component Value Date/Time Bilirubin, total 0.2 2020 08:30 AM  
 ALT (SGPT) 9 (L) 2020 08:30 AM  
 AST (SGOT) 14 (L) 2020 08:30 AM  
 Alk. phosphatase 37 (L) 2020 08:30 AM  
 Protein, total 4.1 (L) 2020 08:30 AM  
 Albumin 1.6 (L) 2020 08:30 AM  
 Globulin 2.5 2020 08:30 AM  
 
2020 Flex/Sig/ Dr Harrell Client Pathology FINAL PATHOLOGIC DIAGNOSIS Rectum, biopsy:  
Adenocarcinoma, poorly to moderately differentiated (see comment). Comment It is uncertain whether this represents a primary lesion or a metastasis. Immunochemical staining for cytokeratin 7, cytokeratin 20 and CDX-2 is being performed and the results will be issued within an addendum report. Imagin/29/20 KUB: IMPRESSION: Gaseous small and large bowel distention. 30 abd/pelvis CT: IMPRESSION: 
Extensive small and large bowel distention. Fluid-filled loops of small and 
large bowel. The absence of IV contrast limits evaluation, consider severe 
enteritis. Distal circumferential rectal wall thickening may be related to 
infectious process, neoplastic etiology is not excluded. Cholelithiasis. Circumferential bladder wall thickening most likely related to chronic outlet 
obstruction. Neoplastic etiologies deemed less likely. Additional incidental findings are as described above. 20 KUB: IMPRESSION: Continued large and small bowel distention. Procedures: 
 
Flex sig 20: Findings: This was an enema prep as he would not tolerate PO prep due to his gastroenteritis. The rectum is cleansed but there is generous stool in the sigmoid. 
Dewitte Peto is a malignant appearing fungating friable circumferental mass from the dentate up to 10cm. We were able to just get the scope to navigate with mild resistance, I estimate the lumen to be 11-12mm. Above 10cm there's too much stool to safely navigate. Cold forceps biopsies obtained for histology. Impression: The endoscopic impression is that of rectal carcinoma. Biopsies pending. He and daughter report previous colonoscopy and GI evaluation having been done years ago at [facility] but I have no access to those records.  
  
Recommendations: - Await pathology. - CT has been done, get CEA, get colorectal surgery consult. 5/5/2020 XR ABD IMPRESSION: Single semiupright view of the upper abdomen demonstrates the tip 
and side-port of the NG tube to overlie the expected location of the stomach. Note is made of multiple dilated gas-filled loops of small bowel. Assessment and Recommendations:  
Abhi Mendez is a 67 y.o. male 1. Rectal mass: Stage unclear at this time and need to rule out distant metastases with imaging using IV contrast when feasible. Have reviewed with  patient's step-daughter that standard of care if no distant metastatic disease would be surgery or neoadjuvant chemoradiation or definitive chemoradiation. She feels that due to patient's dementia, it would be very difficult to care for him and she does not feel he would want aggressive measures of treatment. She is more interested in palliative options. CEA 2.9. Hx of previous ileocecectomy in past noted during OR.  
-- 5/1 flex /sig/ Dr Nate Tolbert :  biopsy results reveal poorly to moderately differentiated adenocarcinoma. IHC stains pending 
-- 5/5 Colon/rectal surgery/ Dr Elder Farrar performed exp lap; lysis of adhesions; end sigmoid colostomy with distal sigmoid colon mucous fistula:  path pending 
--  5/6 CT Chest/ abd/pelvis with IV contrast to complete staging; showed no distant disease. However given significant dementia, pt is unable to make his own decisions. His step daughter does not feel he would want chemotherapy which is reasonable since pt is unable to report side effects and have good understanding of disease and treatment.  Agree with pursing hospice option. 2. Nutrition TPN in use On clear liquids 3. VERONIQUE: 2/2 n/v and initially improved with IVF hydration - nephrology following: Cr improved to 1.15 today 4. Alpha streptococcus UTI: On Cefepime, Flagyl 
--Urology consulted:difficulty inserting lopez during surgery; 5. Dementia: Poor short term memory; not able to make his own decisions. 6. Goals of care Palliative team following Plan reviewed with Dr Ric Casiano Signed By: Connor Franklin NP May 7, 2020

## 2020-05-07 NOTE — WOUND CARE
WOCN OSTOMY PROGRESS NOTE: 
Patient is POD 2 for an Exploratory laparotomy, Lysis of adhesions, and sigmoid colostomy creation with distal sigmoid colon mucous fistula. Patient is sitting in chair, no distress. Colostomy appliance and appliance over mucus fistula are intact. Colostomy emptied of 250 ml of thick brown stool. Mucus fistula emptied of 125 ml of thick light brown mucusy drainage. Mucus drainage from rectum noted. Dressing over RLQ NOEMÍ drain changed. Patient is on a clear liquid diet and observed eating an orange ICE and says he has had popsicles. Patient complained of sore bottom. No issues noted on skin. Cleaned patient and applied zinc, placed waffle cushion under him on seat. Will follow

## 2020-05-07 NOTE — PROGRESS NOTES
Charting of Sal Newington fall risk on 5/6/20 was initially charted in error on 5/7/20. Note was amended and correct values were charted under correct time of 0400 on 5/6/20.

## 2020-05-08 NOTE — PROGRESS NOTES
Bedside and Verbal shift change report given to Trish Brock (oncoming nurse) by Rahul Love (offgoing nurse). Report included the following information SBAR and Kardex.

## 2020-05-08 NOTE — PROGRESS NOTES
CRS 
Pt without abdominal complaints Flowsheet, labs reviewed Abd: soft, nt 
Colostomy: stool Mucous fistula: thin stool Drain: serosang Plan: 
Hold at clears. Slowly advance diet over weekend as tolerated. PT/OT. Pt will undoubtedly need rehab whenever it is that he is ready for dispo. I will be out of office from May 9-May18. [employer] will be covering

## 2020-05-08 NOTE — PROGRESS NOTES
Trey Kincaid [facility] 80 [address] YOB: 1940 Assessment & Plan: VERONIQUE due to IVVD, improved Baseline Cr unknown, likely CKD Hypokalemia, worse Acidosis Colon mass s/p diverting colostomy Rec: 
Receiving K repletion May need more K in TPN Available as needed over weekend Subjective:  
CC: f/u VERONIQUE 
HPI: Renal function stable. K worse today. Feeling better overall. ROS: no sob/n/v Current Facility-Administered Medications Medication Dose Route Frequency  TPN ADULT - CENTRAL AA 5% D20% W/ ELECTROLYTES AND CA   IntraVENous CONTINUOUS  
 potassium phosphate 30 mmol in 0.9% sodium chloride 250 mL infusion   IntraVENous ONCE  
 TPN ADULT - CENTRAL AA 5% D20% W/ ELECTROLYTES AND CA   IntraVENous CONTINUOUS  
 traMADoL (ULTRAM) tablet 50 mg  50 mg Oral Q6H PRN  
 HYDROmorphone (PF) (DILAUDID) injection 0.5 mg  0.5 mg IntraVENous Q3H PRN  
 glucose chewable tablet 16 g  4 Tab Oral PRN  
 glucagon (GLUCAGEN) injection 1 mg  1 mg IntraMUSCular PRN  
 dextrose 10% infusion 0-250 mL  0-250 mL IntraVENous PRN  
 insulin lispro (HUMALOG) injection   SubCUTAneous Q6H  
 fat emulsion 20% (LIPOSYN, INTRAlipid) infusion 500 mL  500 mL IntraVENous Q MON, WED & FRI  morphine injection 2 mg  2 mg IntraVENous Q3H PRN  
 sodium bicarbonate tablet 650 mg  650 mg Oral BID  alteplase (CATHFLO) 1 mg in sterile water (preservative free) 1 mL injection  1 mg InterCATHeter PRN  
 labetaloL (NORMODYNE) tablet 100 mg  100 mg Oral Q12H  
 heparin (porcine) injection 5,000 Units  5,000 Units SubCUTAneous Q8H  
 benzocaine (HURRICANE) 20 % spray   Mucous Membrane PRN  
 sodium chloride (NS) flush 5-40 mL  5-40 mL IntraVENous Q8H  
 sodium chloride (NS) flush 5-40 mL  5-40 mL IntraVENous PRN  
 acetaminophen (TYLENOL) tablet 650 mg  650 mg Oral Q4H PRN  
 ondansetron (ZOFRAN) injection 4 mg  4 mg IntraVENous Q4H PRN  
  labetaloL (NORMODYNE;TRANDATE) 20 mg/4 mL (5 mg/mL) injection 10 mg  10 mg IntraVENous Q4H PRN  pantoprazole (PROTONIX) 40 mg in 0.9% sodium chloride 10 mL injection  40 mg IntraVENous DAILY Objective:  
 
Vitals: 
Blood pressure 120/73, pulse 67, temperature 98.3 °F (36.8 °C), resp. rate 24, height 5' 8\" (1.727 m), weight 93.6 kg (206 lb 5.6 oz), SpO2 99 %. Temp (24hrs), Av.5 °F (36.9 °C), Min:98.1 °F (36.7 °C), Max:99 °F (37.2 °C) Intake and Output: 
 07 - 1900 In: 0 Out: 100 [Urine:100] 1901 -  07 In: 5080.3 [P.O.:702; I.V.:4378.3] Out: 4480 [Urine:2325; Drains:270] Physical Exam:              
GENERAL ASSESSMENT: NAD 
CHEST: CTA HEART: S1S2 ABDOMEN: soft NT 
: +lopez +urine EXTREMITY: no EDEMA 
 
   
ECG/rhythm: 
 
Data Review No results for input(s): TNIPOC in the last 72 hours. No lab exists for component: ITNL No results for input(s): CPK, CKMB, TROIQ in the last 72 hours. Recent Labs 20 
3061 20 
0830 20 
0636 20 
0408  140  --  143  
K 2.6* 3.4*  --  4.0  
* 115*  --  118* CO2 19* 17*  --  17*  
BUN 24* 24*  --  22* CREA 1.19 1.15  --  1.28  
* 91  --  129* PHOS 2.4* 2.4*  --  2.5* MG 1.7 1.9  --  1.3*  
CA 6.9* 6.7*  --  7.0* ALB 1.5* 1.6*  --   --   
WBC 11.9*  --  12.2* 10.8 HGB 10.2*  --  10.7* 11.9*  
HCT 30.3*  --  31.9* 35.9*  
*  --  140* 144* No results for input(s): INR, PTP, APTT, INREXT, INREXT in the last 72 hours. Needs: urine analysis, urine sodium, protein and creatinine Lab Results Component Value Date/Time Sodium,urine random 22 2020 11:32 AM  
 Creatinine, urine 280.00 2020 11:31 AM  
 
 
 
 
: Monster Soliman MD 
2020 [facility]: 
[email or web address] [place] office: 
[address] [place] [address], [id number] [place] [address] Phone: [phone] Fax :     [phone] [place] office: 
[address] [facility], [facility] Phone - [phone] Fax - [phone]

## 2020-05-08 NOTE — ROUTINE PROCESS
Bedside shift change report given to Cheyenne Adame (oncoming nurse) by Shivam Lynch (offgoing nurse). Report included the following information SBAR, Kardex, Intake/Output, MAR, Accordion, Recent Results and Med Rec Status.

## 2020-05-08 NOTE — PROGRESS NOTES
Problem: Risk for Spread of Infection Goal: Prevent transmission of infectious organism to others Outcome: Progressing Towards Goal

## 2020-05-08 NOTE — PROGRESS NOTES
Problem: Risk for Spread of Infection Goal: Prevent transmission of infectious organism to others Description: Prevent the transmission of infectious organisms to other patients, staff members, and visitors. Outcome: Progressing Towards Goal 
  
Problem: Falls - Risk of 
Goal: *Absence of Falls Description: Document Spring Pfeiffer Fall Risk and appropriate interventions in the flowsheet. Outcome: Progressing Towards Goal 
Note: Fall Risk Interventions: 
Mobility Interventions: Bed/chair exit alarm, Communicate number of staff needed for ambulation/transfer, OT consult for ADLs, PT Consult for mobility concerns, Patient to call before getting OOB, PT Consult for assist device competence, Utilize walker, cane, or other assistive device, Utilize gait belt for transfers/ambulation Mentation Interventions: Adequate sleep, hydration, pain control, Bed/chair exit alarm, Door open when patient unattended, Evaluate medications/consider consulting pharmacy, Gait belt with transfers/ambulation, Increase mobility, More frequent rounding, Reorient patient, Room close to nurse's station, Toileting rounds Medication Interventions: Bed/chair exit alarm, Evaluate medications/consider consulting pharmacy, Patient to call before getting OOB, Teach patient to arise slowly, Utilize gait belt for transfers/ambulation Elimination Interventions: Call light in reach, Bed/chair exit alarm, Patient to call for help with toileting needs, Toileting schedule/hourly rounds, Urinal in reach History of Falls Interventions: Bed/chair exit alarm, Door open when patient unattended, Evaluate medications/consider consulting pharmacy, Room close to nurse's station, Utilize gait belt for transfer/ambulation Problem: Pressure Injury - Risk of 
Goal: *Prevention of pressure injury Description: Document Baltazar Scale and appropriate interventions in the flowsheet. Outcome: Progressing Towards Goal 
Note: Pressure Injury Interventions: Sensory Interventions: Assess changes in LOC, Assess need for specialty bed, Check visual cues for pain, Discuss PT/OT consult with provider, Keep linens dry and wrinkle-free, Maintain/enhance activity level, Monitor skin under medical devices, Pressure redistribution bed/mattress (bed type), Turn and reposition approx. every two hours (pillows and wedges if needed) Moisture Interventions: Absorbent underpads, Internal/External urinary devices, Check for incontinence Q2 hours and as needed, Assess need for specialty bed, Maintain skin hydration (lotion/cream) Activity Interventions: Increase time out of bed, Pressure redistribution bed/mattress(bed type), PT/OT evaluation, Assess need for specialty bed Mobility Interventions: HOB 30 degrees or less, Pressure redistribution bed/mattress (bed type), PT/OT evaluation, Assess need for specialty bed, Turn and reposition approx. every two hours(pillow and wedges) Nutrition Interventions: Document food/fluid/supplement intake, Offer support with meals,snacks and hydration Friction and Shear Interventions: HOB 30 degrees or less, Lift sheet, Lift team/patient mobility team, Apply protective barrier, creams and emollients, Transferring/repositioning devices

## 2020-05-08 NOTE — PROGRESS NOTES
[facility] Medical Progress Note NAME: Luke Harris :  1940 MRM:  [id number] Date/Time of service 2020  9:44 AM    
 
  
Assessment and Plan:  
 
Rectal adenocarcinoma / Large bowel obstruction - POA, with CT showing SB and LB distention and fluid-filled loops. Rectal wall thickening concerning for cancer. Rectal adenocarcinoma shown on Bx. No distant mets noted on CT.  GI, CRS and Oncology consulted. CRS did diverting colectomy , for palliative symptom control. CRS has cleared him to ADATA. Family of patient leaning towards no treatment and presumably hospice at discharge. Acute protein calorie malnutrition / Hypocalcemia / Hypoalbuminemia - Due to cancer, and NPO status post op. Patient getting TPN. ADAT per surgery. Monitor and adjust lytes Enteritis / Leukocytosis - Completed 7 days of cefepime and Flagyl, Cx negative.   
  
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 Renal consulted. CT showed circumferential bladder wall thickening most likely related to chronic outlet obstruction. UA consistent with UTI, alpha strep on Ur Cx. Completed enough cefepime for this issue.  
  
Dementia - Severe. Does not know he had surgery or why. Supportive care. At risk for delirium. Sitter prn Debilitated patient - worse than baseline due to surgery. Will do poorly with rehab due to dementia. 
  
HTN (hypertension) - Continue IV labetalol PRN Elevated troponin - POA, strain, due to renal insufficiency. TTE normal.  Appreciate cardiology evaluation. No further workup warranted 
  
  
Subjective: Chief Complaint:  Comfortable. Stool draining from ostomy, serosanguinous output from drain ROS: 
(bold if positive, if negative) Tolerating minimal PT Tolerating clears Objective:  
 
Last 24hrs VS reviewed since prior progress note. Most recent are: 
 
Visit Vitals /73 (BP 1 Location: Left arm, BP Patient Position: At rest;Supine) Pulse 67  
 Temp 98.3 °F (36.8 °C) Resp 24 Ht 5' 8\" (1.727 m) Wt 93.6 kg (206 lb 5.6 oz) SpO2 99% BMI 31.38 kg/m² SpO2 Readings from Last 6 Encounters:  
05/08/20 99% Intake/Output Summary (Last 24 hours) at 5/8/2020 7402 Last data filed at 5/8/2020 8760 Gross per 24 hour Intake 1840. 75 ml Output 2830 ml Net -989.25 ml Physical Exam: 
 
Gen:  Frail, in no acute distress HEENT:  Pink conjunctivae, PERRL, hearing intact to voice, moist mucous membranes Neck:  Supple, without masses, thyroid non-tender Resp:  No accessory muscle use, clear breath sounds without wheezes rales or rhonchi 
Card:  No murmurs, normal S1, S2 without thrills, bruits or peripheral edema Abd:  Soft, non-tender, distended, reduced bowel sounds are present, no mass, ostomy Lymph:  No cervical or inguinal adenopathy Musc:  No cyanosis or clubbing Skin:  No rashes or ulcers, skin turgor is good Neuro:  Cranial nerves are grossly intact, general motor weakness, follows commands vaguely Psych:  Poor insight, oriented to person Telemetry reviewed:   normal sinus rhythm 
__________________________________________________________________ Medications Reviewed: (see below) Medications:  
 
Current Facility-Administered Medications Medication Dose Route Frequency  TPN ADULT - CENTRAL AA 5% D20% W/ ELECTROLYTES AND CA   IntraVENous CONTINUOUS  
 potassium phosphate 30 mmol in 0.9% sodium chloride 250 mL infusion   IntraVENous ONCE  
 TPN ADULT - CENTRAL AA 5% D20% W/ ELECTROLYTES AND CA   IntraVENous CONTINUOUS  
 traMADoL (ULTRAM) tablet 50 mg  50 mg Oral Q6H PRN  
 HYDROmorphone (PF) (DILAUDID) injection 0.5 mg  0.5 mg IntraVENous Q3H PRN  
 glucose chewable tablet 16 g  4 Tab Oral PRN  
 glucagon (GLUCAGEN) injection 1 mg  1 mg IntraMUSCular PRN  
 dextrose 10% infusion 0-250 mL  0-250 mL IntraVENous PRN  
 insulin lispro (HUMALOG) injection   SubCUTAneous Q6H  
  fat emulsion 20% (LIPOSYN, INTRAlipid) infusion 500 mL  500 mL IntraVENous Q MON, WED & FRI  morphine injection 2 mg  2 mg IntraVENous Q3H PRN  
 sodium bicarbonate tablet 650 mg  650 mg Oral BID  alteplase (CATHFLO) 1 mg in sterile water (preservative free) 1 mL injection  1 mg InterCATHeter PRN  
 labetaloL (NORMODYNE) tablet 100 mg  100 mg Oral Q12H  
 heparin (porcine) injection 5,000 Units  5,000 Units SubCUTAneous Q8H  
 benzocaine (HURRICANE) 20 % spray   Mucous Membrane PRN  
 sodium chloride (NS) flush 5-40 mL  5-40 mL IntraVENous Q8H  
 sodium chloride (NS) flush 5-40 mL  5-40 mL IntraVENous PRN  
 acetaminophen (TYLENOL) tablet 650 mg  650 mg Oral Q4H PRN  
 ondansetron (ZOFRAN) injection 4 mg  4 mg IntraVENous Q4H PRN  
 labetaloL (NORMODYNE;TRANDATE) 20 mg/4 mL (5 mg/mL) injection 10 mg  10 mg IntraVENous Q4H PRN  pantoprazole (PROTONIX) 40 mg in 0.9% sodium chloride 10 mL injection  40 mg IntraVENous DAILY Lab Data Reviewed: (see below) Lab Review:  
 
Recent Labs 05/08/20 
1704 05/07/20 
8338 05/06/20 
0408 WBC 11.9* 12.2* 10.8 HGB 10.2* 10.7* 11.9*  
HCT 30.3* 31.9* 35.9*  
* 140* 144* Recent Labs 05/08/20 
0253 05/07/20 
0830 05/06/20 
0408  140 143  
K 2.6* 3.4* 4.0  
* 115* 118* CO2 19* 17* 17* * 91 129* BUN 24* 24* 22* CREA 1.19 1.15 1.28  
CA 6.9* 6.7* 7.0*  
MG 1.7 1.9 1.3*  
PHOS 2.4* 2.4* 2.5* ALB 1.5* 1.6*  --   
TBILI 0.4 0.2  --   
SGOT 13* 14*  --   
ALT 10* 9*  --   
 
Lab Results Component Value Date/Time Glucose (POC) 98 05/08/2020 06:06 AM  
 Glucose (POC) 97 05/07/2020 11:43 PM  
 Glucose (POC) 108 (H) 05/07/2020 06:29 PM  
 Glucose (POC) 101 (H) 05/07/2020 03:58 PM  
 Glucose (POC) 102 (H) 05/07/2020 12:00 PM  
 
No results for input(s): PH, PCO2, PO2, HCO3, FIO2 in the last 72 hours. No results for input(s): INR, INREXT, INREXT in the last 72 hours. All Micro Results Procedure Component Value Units Date/Time CULTURE, BLOOD [[id number]] Collected:  04/30/20 0140 Order Status:  Completed Specimen:  Blood Updated:  05/06/20 0447 Special Requests: NO SPECIAL REQUESTS Culture result: NO GROWTH 6 DAYS     
 CULTURE, URINE [[id number]]  (Abnormal) Collected:  04/30/20 1132 Order Status:  Completed Specimen:  Urine Updated:  05/03/20 1531 Special Requests: --     
  NO SPECIAL REQUESTS Reflexed from [id number] [place] Count --     
  >100,000 COLONIES/mL Culture result:    
  AEROCOCCUS URINAE A. URINAE HAS BEEN DESCRIBED AS SUSCEPTIBLE TO PENICILLIN, AMOXICILLIN, PIPERACILLIN, CEFIPIME, RIFAMPIN AND NITROFURANTOIN, BUT RESISTANT TO SULFONAMIDES. [address] [[id number]] Collected:  05/01/20 2230 Order Status:  Canceled Specimen:  Feces from Stool C. DIFFICILE AG & TOXIN A/B [[id number]] Order Status:  Canceled Specimen:  Stool ENTERIC BACTERIA PANEL, DNA [[id number]] Order Status:  Canceled Specimen:  Stool URINE CULTURE HOLD SAMPLE [[id number]] Order Status:  Canceled Specimen:  Urine Other pertinent lab: none Total time spent with patient: 28 Minutes I personally reviewed chart, notes, data and current medications in the medical record. I have personally examined and treated the patient at bedside during this period. Care Plan discussed with: Patient, Nursing Staff and >50% of time spent in counseling and coordination of care Discussed:  Care Plan Prophylaxis:  H2B/PPI Disposition:   PT, OT, RN 
        
___________________________________________________ Attending Physician: Jesus Latif MD

## 2020-05-08 NOTE — WOUND CARE
Ostomy nurse follow up Plan of care for discharge discussed with care manager- advised to call Michelle Vergara- daughter regarding care on discharge plan. Discussed ostomy care and management - support at home. Will plan to send her educational material via email and mail to assist with education. Tentative plan for dc to rehab. Daughter states she has close friend who is experienced nurse and will assist if needed. Will follow up - daughter to contact ostomy nurse with additional questions.  
 
[address] [facility]

## 2020-05-08 NOTE — PROGRESS NOTES
Problem: Mobility Impaired (Adult and Pediatric) Goal: *Acute Goals and Plan of Care (Insert Text) Description: FUNCTIONAL STATUS PRIOR TO ADMISSION: Patient was modified independent for functional mobility and ADLs per patient, still drives. HOME SUPPORT PRIOR TO ADMISSION: The patient lived with family members but did not require assist. 
 
Physical Therapy Goals Initiated 5/1/2020; continue same goals as of 5/8/2020 1. Patient will move from supine to sit and sit to supine , scoot up and down and roll side to side in bed with independence within 7 day(s). 2.  Patient will transfer from bed to chair and chair to bed with independence using the least restrictive device within 7 day(s). 3.  Patient will perform sit <> stand with independence within 7 day(s). 4.  Patient will ambulate with independence for 150 feet with the least restrictive device within 7 day(s). Outcome: Progressing Towards Goal 
 PHYSICAL THERAPY TREATMENT: WEEKLY REASSESSMENT Patient: Apolonia Hoang (16 y.o. male) Date: 5/8/2020 Primary Diagnosis: Enteritis [K52.9] Procedure(s) (LRB): OPEN DIVERTING COLOSTOMY CREATION (N/A) 3 Days Post-Op Precautions:   Contact, Fall ASSESSMENT Patient continues with skilled PT services and is progressing towards goals. Rolled on the edge of bed mod assist, supine to sit mod assist, sit to stand mod assist, ambulate with rolling walker mod assist to and from the bedside commode. OOB to chair as tolerated performed some active range of motion exercise on both LE all planes. Educate and  Instructed patient to continue active range of motion exercise on both legs while up on chair or on bed. Communicated with nurse who agreed to monitor patient. Patient's progression toward goals since last assessment: good. Current Level of Function Impacting Discharge (mobility/balance): mod assist with transfers and ambulation using a  rolling walker. Functional Outcome Measure: The patient scored 40/100 on the barthel index outcome measure. Other factors to consider for discharge: fall PLAN : 
Goals have been updated based on progression since last assessment. Patient continues to benefit from skilled intervention to address the above impairments. Recommendations and Planned Interventions: bed mobility training, transfer training, gait training, therapeutic exercises, neuromuscular re-education, patient and family training/education, and therapeutic activities Frequency/Duration: Patient will be followed by physical therapy:  5 times a week to address goals. Recommendation for discharge: (in order for the patient to meet his/her long term goals) Physical therapy at least 2 days/week in the home AND ensure assist and/or supervision for safety with rolling walker This discharge recommendation: 
Has been made in collaboration with the attending provider and/or case management IF patient discharges home will need the following DME: patient owns DME required for discharge SUBJECTIVE:  
Patient stated ok.  OBJECTIVE DATA SUMMARY:  
HISTORY:   
Past Medical History:  
Diagnosis Date History of vascular access device 05/04/2020 [facility] VAT 5Fr Triple PICC Right brachial 43cm for TPN Past Surgical History:  
Procedure Laterality Date FLEXIBLE SIGMOIDOSCOPY N/A 5/1/2020 SIGMOIDOSCOPY FLEXIBLE performed by Zoya Valdivia MD at [address] Personal factors and/or comorbidities impacting plan of care:  
 
Home Situation Home Environment: Private residence # Steps to Enter: 1 Rails to Enter: No 
One/Two Story Residence: One story Living Alone: No 
Support Systems: Child(tio), Family member(s)(step dgt and grandchildren) Patient Expects to be Discharged to[de-identified] Private residence Current DME Used/Available at Home: None Tub or Shower Type: Tub/Shower combination EXAMINATION/PRESENTATION/DECISION MAKING:  
Critical Behavior: 
Neurologic State: Alert, Lethargic Orientation Level: Oriented to person, Oriented to place, Disoriented to situation, Disoriented to time Cognition: Follows commands Safety/Judgement: Decreased awareness of environment, Decreased awareness of need for assistance, Fall prevention, Decreased insight into deficits Hearing: Auditory Auditory Impairment: None Range Of Motion: 
AROM: Generally decreased, functional 
  
  
  
PROM: Generally decreased, functional 
  
  
  
Strength:   
Strength: Generally decreased, functional 
  
  
  
  
  
  
Tone & Sensation:  
  
  
  
  
  
  
  
  
  
   
Coordination: 
Coordination: Generally decreased, functional 
Vision:  
  
Functional Mobility: 
Bed Mobility: 
Rolling: Moderate assistance Supine to Sit: Moderate assistance Sit to Supine: Moderate assistance Scooting: Moderate assistance Transfers: 
Sit to Stand: Moderate assistance Stand to Sit: Moderate assistance Stand Pivot Transfers: Moderate assistance Bed to Chair: Moderate assistance Balance:  
Sitting: Intact; High guard Sitting - Static: Fair (occasional) Sitting - Dynamic: Fair (occasional) Standing: Impaired;Pull to stand; With support Standing - Static: Fair Standing - Dynamic : Fair Ambulation/Gait Training: 
Distance (ft): 10 Feet (ft) Assistive Device: Walker, rolling;Gait belt Ambulation - Level of Assistance: Moderate assistance Gait Description (WDL): Exceptions to [facility] Gait Abnormalities: Path deviations; Step to gait Base of Support: Widened Speed/[place]: Slow;Fluctuations Step Length: Right shortened;Left shortened Therapeutic Exercises:  
 Instructed patient to continue active range of motion exercise on both legs while up on chair or on bed. Functional Measure: 
Barthel Index: 
 
Bathin Bladder: 0 Bowels: 5 Groomin Dressin Feeding: 10 
 Mobility: 0 Stairs: 0 Toilet Use: 5 Transfer (Bed to Chair and Back): 10 Total: 40/100 The Barthel ADL Index: Guidelines 1. The index should be used as a record of what a patient does, not as a record of what a patient could do. 2. The main aim is to establish degree of independence from any help, physical or verbal, however minor and for whatever reason. 3. The need for supervision renders the patient not independent. 4. A patient's performance should be established using the best available evidence. Asking the patient, friends/relatives and nurses are the usual sources, but direct observation and common sense are also important. However direct testing is not needed. 5. Usually the patient's performance over the preceding 24-48 hours is important, but occasionally longer periods will be relevant. 6. Middle categories imply that the patient supplies over 50 per cent of the effort. 7. Use of aids to be independent is allowed. Kayy Mcmullen., Barthel, D.W. (3936). Functional evaluation: the Barthel Index. [address] [facility] (14)2. Александр Dong miguelina RAMÓN Sharma, Becky Ocampo., Elijah Ron., [place], [address] [place] [address] (). Measuring the change indisability after inpatient rehabilitation; comparison of the responsiveness of the Barthel Index and Functional [place] Measure. Journal of Neurology, Neurosurgery, and Psychiatry, 66(4), 179-669. Adrianne Ramos, N.J.A, EVERETT Tee, & Pipo Masterson, M.A. (2004.) Assessment of post-stroke quality of life in cost-effectiveness studies: The usefulness of the Barthel Index and the EuroQoL-5D. [facility], 13, 590-67 Pain Ratin/10 Activity Tolerance:  
Good Please refer to the flowsheet for vital signs taken during this treatment. After treatment patient left in no apparent distress:  
Sitting in chair, Heels elevated for pressure relief, Call bell within reach, and Bed / chair alarm activated COMMUNICATION/EDUCATION:  
 The patients plan of care was discussed with: Registered nurse. Fall prevention education was provided and the patient/caregiver indicated understanding. Thank you for this referral. 
Job Morris, PT Time Calculation: 24 mins

## 2020-05-08 NOTE — WOUND CARE
Wound/Ostomy Follow up: 
 
1030 - Patient is resting comfortably without compliant at this time. On assessment there is leaking from the fistula site under the distal portion of the ostomy wafer. Both Ostomy appliances were removed from the sites. Yuliana-stomal and yuliana-fistula skin is intact. Stoma is pink/budded above the skin without separation putting out liquid bilious stool. The fistula has sutures intact to the lateral site putting out moderate amount of thick mucous. Midline incision is intact with edges approximated and healing with scant serous drainage noted along the surgical site. 2 piece ostomy appliance applied to fit comfortably over the stoma site and the fistula site. 1400 - Patient is resting in bed with noted leaking from the ostomy site and draining fistula. The ostomy appliance was removed. Areas cleansed. Ostomy appliance removed from midline surgical incision. Cleansed and placed ABD pad to cover incision site for scant serous drainage. A small pediatric drain was applied to the distal fistula site and a 2 piece ostomy was applied to fit over the stoma site. Stoma paste was applied along the edge of both distal ostomy wafers prior to application.   
 
Will Follow, Consult as needed,  
Christina ROLDAN RN [facility], [facility].

## 2020-05-08 NOTE — PROGRESS NOTES
5/8/2020   CARE MANAGEMENT NOTE:  CM reviewed EMR for clinical updates (pt off the floor on 5/5 2/2 surgery).  Pt was admitted with enteritis. Reportedly, pt resides with his dtr, Cheryl Bergeron ([phone]).   
RUR 22%; LOS 8 days 
  
Transition Plan of Care: 1.  Discussed rehab options with dtr and she agrees to [address] [place] [address], or [facility] ([facility], and [facility]). Referrals have been sent for review. 2  Dtr will provide transportation at discharge. 
  
CM will continue to follow pt for definitive discharge plan: [facility] vs SNF rehab. Araceli

## 2020-05-08 NOTE — PROGRESS NOTES
Problem: Self Care Deficits Care Plan (Adult) Goal: *Acute Goals and Plan of Care (Insert Text) Description:  
FUNCTIONAL STATUS PRIOR TO ADMISSION: Pt poor historian; however, reports living with daughter in law, residing on first floor of two story house, with no MARILYN, stating he was Independent with ADLs without use of DME, standing to shower in walk-in shower. Reports he enjoys listening to all music but the \"hillbilly\" kind. HOME SUPPORT: The patient lived with daughter in law who provides PRN ADL/IADL assist. 
 
Occupational Therapy Goals Weekly re-assessment 5/7/2020, patient with decline in function, therefore goals updated below: 1. Patient will perform grooming seated EOB with supervision within 7 day(s). 2.  Patient will perform lower body dressing with supervision using adaptive strategies PRN within 7 day(s). 3.  Patient will perform bathing with minimal assistance within 7 day(s). 4.  Patient will perform toilet transfers with supervision within 7 day(s). 5.  Patient will participate in upper extremity therapeutic exercise/activities with supervision for 5 minutes within 7 day(s). 6.  Patient will utilize energy conservation techniques during functional activities with Min verbal cues within 7 day(s).  
 
Initiated 4/30/2020.   
1.  Patient will perform standing grooming with supervision within 7 day(s)- MET 5/1/20 and upgrade to mod I. 
2.  Patient will perform lower body dressing with supervision within 7 day(s)- MET 5/1/20 and upgrade to mod I. 
3.  Patient will perform bathing with supervision within 7 day(s)- MET 5/1/20 and upgrade to mod I. 
4.  Patient will perform toilet transfers with supervision within 7 day(s)- MET 5/1/20 and upgrade to mod I. 
5.  Patient will perform all aspects of toileting with supervision within 7 day(s)- MET 5/1/20 and upgrade to mod I. 
6.  Patient will participate in upper extremity therapeutic exercise/activities with supervision for 10 minutes within 7 day(s). 7.  Patient will utilize energy conservation techniques during functional activities with Min verbal cues within 7 day(s). Outcome: Progressing Towards Goal 
 OCCUPATIONAL THERAPY TREATMENT Patient: Wilfrid Corral (24 y.o. male) Date: 5/8/2020 Diagnosis: Enteritis [K52.9] <principal problem not specified> Procedure(s) (LRB): OPEN DIVERTING COLOSTOMY CREATION (N/A) 3 Days Post-Op Precautions: Contact, Fall Chart, occupational therapy assessment, plan of care, and goals were reviewed. ASSESSMENT Patient continues with skilled OT services and is progressing towards goals. Pt engaged with UE bed exercises declining any further activity yet stating he \"just wants to go home. \" HR 75 with activity. Current Level of Function Impacting Discharge (ADLs): Supervision LB bath and dress Other factors to consider for discharge: PLAN : 
Patient continues to benefit from skilled intervention to address the above impairments. Continue treatment per established plan of care. to address goals. Recommend with staff: ADL's, therapeutic activity and exercises Recommend next OT session: Cont towards goals Recommendation for discharge: (in order for the patient to meet his/her long term goals) Therapy up to 5 days/week in SNF setting or an intensive home health therapy program 
 
This discharge recommendation: 
Has not yet been discussed the attending provider and/or case management IF patient discharges home will need the following DME:   
 
 
SUBJECTIVE:  
Patient stated I just want to go home OBJECTIVE DATA SUMMARY:  
Cognitive/Behavioral Status: 
Neurologic State: Alert Orientation Level: Oriented to person;Oriented to place Cognition: Follows commands Functional Mobility and Transfers for ADLs: 
Bed Mobility: 
Rolling: Moderate assistance Supine to Sit: Moderate assistance Sit to Supine: Moderate assistance Scooting: Moderate assistance Transfers: 
Sit to Stand: Moderate assistance Bed to Chair: Moderate assistance Balance: 
Sitting: Intact; High guard Sitting - Static: Fair (occasional) Sitting - Dynamic: Fair (occasional) Standing: Impaired;Pull to stand; With support Standing - Static: Fair Standing - Dynamic : Fair ADL Intervention: Moderate assist bathing and dressing Therapeutic Exercises:  
 
EXERCISE Sets Reps Active Active Assist  
Passive Comments Shoulder flex/ext 2 10 [x]           []           [] Chest presses 2 10 [x]           []           [] Elbow flex/ext 2 10 [x]           []           [] Forearm supination/pronation 2 10 [x]           []           []             
   []           []           []             
   []           []           [] Activity Tolerance:  
Good Please refer to the flowsheet for vital signs taken during this treatment. After treatment patient left in no apparent distress:  
Supine in bed COMMUNICATION/COLLABORATION:  
The patients plan of care was discussed with: Occupational therapist.  
 
KRANTHI Shepard Time Calculation: 15 mins

## 2020-05-08 NOTE — ROUTINE PROCESS
Primary Nurse Joanne Brumfield RN and SHIRA Gentile performed a dual skin assessment on this patient Impairment noted- see wound doc flow sheet Baltazar score is 20

## 2020-05-08 NOTE — ANESTHESIA POSTPROCEDURE EVALUATION
Procedure(s): SIGMOIDOSCOPY FLEXIBLE 
COLON BIOPSY. MAC Anesthesia Post Evaluation Multimodal analgesia: multimodal analgesia not used between 6 hours prior to anesthesia start to PACU discharge Patient location during evaluation: PACU Patient participation: complete - patient participated Level of consciousness: awake Pain management: adequate Airway patency: patent Anesthetic complications: no 
Cardiovascular status: acceptable, blood pressure returned to baseline and hemodynamically stable Respiratory status: acceptable Hydration status: acceptable Post anesthesia nausea and vomiting:  controlled Vitals Value Taken Time /74 5/7/2020 11:14 PM  
Temp 36.8 °C (98.3 °F) 5/7/2020 11:14 PM  
Pulse 69 5/7/2020 11:14 PM  
Resp 22 5/7/2020 11:14 PM  
SpO2 98 % 5/7/2020 11:14 PM

## 2020-05-08 NOTE — DISCHARGE SUMMARY
Physician Interim Discharge Summary Patient ID: 
Marleny Dominique [id number] 
48 y.o. 
1940 Admit date: 4/29/2020 Admission Diagnoses: Enteritis [K52.9] Current Diagnoses:   
Principal Diagnosis Rectal adenocarcinoma Hospital Course through 5/8/2020:  
Rectal adenocarcinoma / Large bowel obstruction - POA, with CT showing SB and LB distention and fluid-filled loops. Rectal wall thickening concerning for cancer. Rectal adenocarcinoma shown on Bx. No distant mets noted on CT.  GI, CRS and Oncology consulted. CRS did diverting colectomy 5/5, for palliative symptom control. CRS has cleared him to ADATA. Family of patient leaning towards no treatment and presumably hospice at discharge. 
  
Acute protein calorie malnutrition / Hypocalcemia / Hypoalbuminemia - Due to cancer, and NPO status post op. Patient getting TPN. ADAT per surgery. Monitor and adjust lytes  
  
Enteritis / Leukocytosis - Completed 7 days of cefepime and Flagyl, Cx negative.   
  
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 Renal consulted. CT showed circumferential bladder wall thickening most likely related to chronic outlet obstruction. UA consistent with UTI, alpha strep on Ur Cx. Completed enough cefepime for this issue.  
  
Dementia - Severe. Does not know he had surgery or why. Supportive care. At risk for delirium. Sitter prn 
  
Debilitated patient - worse than baseline due to surgery. Will do poorly with rehab due to dementia. 
  
HTN (hypertension) - Continue IV labetalol PRN 
  
Elevated troponin - POA, strain, due to renal insufficiency. TTE normal.  Appreciate cardiology evaluation. No further workup warranted PCP: Jannet Anderson MD 
 
Consults: GI, Hematology/Oncology and General Surgery Significant Diagnostic Studies: See Hospital Course Further details by discharging physician Signed: 
Allene Aschoff, MD 
5/8/2020 
11:23 AM

## 2020-05-09 NOTE — PROGRESS NOTES
Bedside and Verbal shift change report given to Cheyenne Ramsey RN and SHIRA Sears (oncoming nurse) by Bala Sesay RN (offgoing nurse). Report included the following information SBAR, Kardex, Intake/Output, MAR, Accordion and Recent Results.

## 2020-05-09 NOTE — PROGRESS NOTES
RN contacted nephrology Maximino Boast, MD) with most recent lab results at [phone], as ordered.

## 2020-05-09 NOTE — PROGRESS NOTES
Bedside shift change report given to [address] [place] [address] (oncoming nurse) by Sruthi Hernandez (offgoing nurse). Report included the following information SBAR, Kardex, OR Summary, Procedure Summary, Intake/Output, MAR and Recent Results.

## 2020-05-09 NOTE — PROGRESS NOTES
CRS 
 
Pt with no issues overnight - tolerating some clear liquids. Abd soft, ND, approp ttp Incisions c/d/i Ostomy pink and patent w stool in the bag Plan: 
- adv to full liquid diet 
- cont tpn 
- oral pain meds

## 2020-05-09 NOTE — PROGRESS NOTES
Trey Kincaid [facility] 
[address] [facility], [address] [place] [address] 
[phone] Medical Progress Note NAME: Milvia Miller :  1940 MRM:  [id number] Date/Time of service: 2020  11:34 AM 
 
  
Subjective: Chief Complaint:  Patient was personally seen and examined by me during this time period. Chart reviewed. Still with some abd pain. Tolerating diet Objective:  
 
 
Vitals:  
 
 
Last 24hrs VS reviewed since prior progress note. Most recent are: 
 
Visit Vitals /74 (BP 1 Location: Left arm, BP Patient Position: At rest;Supine) Pulse 63 Temp 98.4 °F (36.9 °C) Resp 18 Ht 5' 8\" (1.727 m) Wt 93.6 kg (206 lb 5.6 oz) SpO2 98% BMI 31.38 kg/m² SpO2 Readings from Last 6 Encounters:  
20 98% Intake/Output Summary (Last 24 hours) at 2020 1134 Last data filed at 2020 1940 Gross per 24 hour Intake 790 ml Output 3715 ml Net -2925 ml Exam:  
 
Physical Exam: 
 
Gen:  Elderly, disheveled, NAD HEENT:  Pink conjunctivae, PERRL, hearing intact to voice, moist mucous membranes Neck:  Supple, without masses, thyroid non-tender Resp:  No accessory muscle use, clear breath sounds without wheezes rales or rhonchi 
Card:  No murmurs, normal S1, S2 without thrills, bruits or peripheral edema Abd:  Soft, mild diffused pain, non-distended, normoactive bowel sounds are present, colostomy intact Musc:  No cyanosis or clubbing Skin:  No rashes Neuro:  Cranial nerves 3-12 are grossly intact, follows commands appropriately Psych:  Poor insight, oriented to person Medications Reviewed: (see below) Lab Data Reviewed: (see below) 
 
______________________________________________________________________ Medications:  
 
Current Facility-Administered Medications Medication Dose Route Frequency  potassium phosphate 30 mmol in 0.9% sodium chloride 250 mL infusion   IntraVENous ONCE  
  potassium chloride SR (KLOR-CON 10) tablet 40 mEq  40 mEq Oral ONCE  
 TPN ADULT - CENTRAL AA 5% D20% W/ ELECTROLYTES AND CA   IntraVENous CONTINUOUS  
 TPN ADULT - CENTRAL AA 5% D20% W/ ELECTROLYTES AND CA   IntraVENous CONTINUOUS  
 traMADoL (ULTRAM) tablet 50 mg  50 mg Oral Q6H PRN  
 HYDROmorphone (PF) (DILAUDID) injection 0.5 mg  0.5 mg IntraVENous Q3H PRN  
 glucose chewable tablet 16 g  4 Tab Oral PRN  
 glucagon (GLUCAGEN) injection 1 mg  1 mg IntraMUSCular PRN  
 dextrose 10% infusion 0-250 mL  0-250 mL IntraVENous PRN  
 insulin lispro (HUMALOG) injection   SubCUTAneous Q6H  
 fat emulsion 20% (LIPOSYN, INTRAlipid) infusion 500 mL  500 mL IntraVENous Q MON, WED & FRI  morphine injection 2 mg  2 mg IntraVENous Q3H PRN  
 sodium bicarbonate tablet 650 mg  650 mg Oral BID  alteplase (CATHFLO) 1 mg in sterile water (preservative free) 1 mL injection  1 mg InterCATHeter PRN  
 labetaloL (NORMODYNE) tablet 100 mg  100 mg Oral Q12H  
 heparin (porcine) injection 5,000 Units  5,000 Units SubCUTAneous Q8H  
 benzocaine (HURRICANE) 20 % spray   Mucous Membrane PRN  
 sodium chloride (NS) flush 5-40 mL  5-40 mL IntraVENous Q8H  
 sodium chloride (NS) flush 5-40 mL  5-40 mL IntraVENous PRN  
 acetaminophen (TYLENOL) tablet 650 mg  650 mg Oral Q4H PRN  
 ondansetron (ZOFRAN) injection 4 mg  4 mg IntraVENous Q4H PRN  
 labetaloL (NORMODYNE;TRANDATE) 20 mg/4 mL (5 mg/mL) injection 10 mg  10 mg IntraVENous Q4H PRN  pantoprazole (PROTONIX) 40 mg in 0.9% sodium chloride 10 mL injection  40 mg IntraVENous DAILY Lab Review:  
 
Recent Labs 05/09/20 
7712 05/08/20 
2563 05/07/20 
6003 WBC 11.5* 11.9* 12.2* HGB 10.5* 10.2* 10.7* HCT 30.9* 30.3* 31.9*  
 140* 140* Recent Labs 05/09/20 
0516 05/08/20 
0253 05/07/20 
0830  140 140  
K 2.4* 2.6* 3.4*  
* 115* 115* CO2 20* 19* 17* * 113* 91 BUN 20 24* 24* CREA 1.18 1.19 1.15  
 CA 7.0* 6.9* 6.7* MG 1.8 1.7 1.9 PHOS 2.4* 2.4* 2.4* ALB 1.5* 1.5* 1.6* TBILI 0.2 0.4 0.2 SGOT 13* 13* 14* ALT 9* 10* 9* Lab Results Component Value Date/Time Glucose (POC) 128 (H) 05/09/2020 06:01 AM  
 Glucose (POC) 111 (H) 05/09/2020 12:07 AM  
 Glucose (POC) 82 05/08/2020 06:23 PM  
 Glucose (POC) 113 (H) 05/08/2020 04:10 PM  
 Glucose (POC) 120 (H) 05/08/2020 11:44 AM  
 
 
  
Assessment / Plan:  
 
68 yo hx of HTN, CKD 3, dementia, presented w/ N/V, VERONIQUE, found to have rectal CA, now s/p diverting colostomy on 05/05 1) Rectal CA w/ large bowel obstruction: no distant mets on CT.  GI, Oncology, and colorectal surg following. S/p diverting colostomy on 05/05. Family leaning toward hospice rather than treatment 2) Severe pro-kaleb malnutrition: due to rectal cancer. Cont TPN (surg managing). Monitor electrolytes closely for re-feeding 3) VERONIQUE/CKD 3: now improving. Due to dehydration. Cont IVF, TPN. Monitor 4) Enteritis/UTI: UCx with alpha strep. 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 Cont lopez 6) HTN: cont labetalol 7) Dementia: monitor for delirium 8) HypoK/phos: cont IV repletion 9) Debility: cont PT/OT. Awaiting [facility] vs SNF Total time spent with patient: 35 min **I personally saw and examined the patient during this time period** Care Plan discussed with: Patient, nursing Discussed:  Care Plan Prophylaxis:  Hep SQ Disposition:  [facility]/Rehab vs SNF 
        
___________________________________________________ Attending Physician: Amauri Worthington MD

## 2020-05-10 NOTE — PROGRESS NOTES
Problem: Self Care Deficits Care Plan (Adult) Goal: *Acute Goals and Plan of Care (Insert Text) Description:  
FUNCTIONAL STATUS PRIOR TO ADMISSION: Pt poor historian; however, reports living with daughter in law, residing on first floor of two story house, with no MARILYN, stating he was Independent with ADLs without use of DME, standing to shower in walk-in shower. Reports he enjoys listening to all music but the \"hillbilly\" kind. HOME SUPPORT: The patient lived with daughter in law who provides PRN ADL/IADL assist. 
 
Occupational Therapy Goals Weekly re-assessment 5/7/2020, patient with decline in function, therefore goals updated below: 1. Patient will perform grooming seated EOB with supervision within 7 day(s). 2.  Patient will perform lower body dressing with supervision using adaptive strategies PRN within 7 day(s). 3.  Patient will perform bathing with minimal assistance within 7 day(s). 4.  Patient will perform toilet transfers with supervision within 7 day(s). 5.  Patient will participate in upper extremity therapeutic exercise/activities with supervision for 5 minutes within 7 day(s). 6.  Patient will utilize energy conservation techniques during functional activities with Min verbal cues within 7 day(s).  
 
Initiated 4/30/2020.   
1.  Patient will perform standing grooming with supervision within 7 day(s)- MET 5/1/20 and upgrade to mod I. 
2.  Patient will perform lower body dressing with supervision within 7 day(s)- MET 5/1/20 and upgrade to mod I. 
3.  Patient will perform bathing with supervision within 7 day(s)- MET 5/1/20 and upgrade to mod I. 
4.  Patient will perform toilet transfers with supervision within 7 day(s)- MET 5/1/20 and upgrade to mod I. 
5.  Patient will perform all aspects of toileting with supervision within 7 day(s)- MET 5/1/20 and upgrade to mod I. 
6.  Patient will participate in upper extremity therapeutic exercise/activities with supervision for 10 minutes within 7 day(s). 7.  Patient will utilize energy conservation techniques during functional activities with Min verbal cues within 7 day(s). Outcome: Progressing Towards Goal 
 
OCCUPATIONAL THERAPY TREATMENT Patient: Janis Fajardo (89 y.o. male) Date: 5/10/2020 Diagnosis: Enteritis [K52.9] <principal problem not specified> Procedure(s) (LRB): OPEN DIVERTING COLOSTOMY CREATION (N/A) 5 Days Post-Op Precautions: Contact, Fall Chart, occupational therapy assessment, plan of care, and goals were reviewed. ASSESSMENT Patient continues with skilled OT services and is progressing towards goals. Pt noted to have improved strength and activity tolerance, highly motivated to participate with therapy this date. He is able to progress mobility to bathroom and perform simple grooming task in standing at sink with overall CGA. He requires rest breaks throughout session but saturations stable. He is intermittently confused, aware he had a surgery but asks why he had to have it; questions deferred to RN and MD. Colostomy site leaking at end of session and pt left with RN present. If pt were to return home, he would need 24/7 supervision/assistance for safety and hands on assist for all mobility and ADLs, as well as for ostomy mgmt. Current Level of Function Impacting Discharge (ADLs): mod A bed mobility; min A OOB mobility with rolling walker; total A ostomy mgmt; mod A lower body ADLs; set up to CGA/Daniella for grooming Other factors to consider for discharge: confusion/dementia; new colostomy; fall risk PLAN : 
Patient continues to benefit from skilled intervention to address the above impairments. Continue treatment per established plan of care. to address goals. Recommend with staff: 1-2 person assist for mobility pending pt energy/fatigue levels, encourage participation in ADLs as able; OOB to chair for meals Recommend next OT session: lower body ADLs; standing tolerance Recommendation for discharge: (in order for the patient to meet his/her long term goals) To be determined: Home with HH and family assist vs. SNF-level rehab This discharge recommendation: 
Has been made in collaboration with the attending provider and/or case management IF patient discharges home will need the following DME: walker: rolling SUBJECTIVE:  
Patient stated I want to know why they had to cut me open.  OBJECTIVE DATA SUMMARY:  
Cognitive/Behavioral Status: 
Neurologic State: Alert(intermittent confusion) Orientation Level: Oriented to person;Oriented to place;Oriented to time(able to state month and year; knows he had a surgery) Cognition: Follows commands Perception: Appears intact Perseveration: No perseveration noted Safety/Judgement: Decreased awareness of need for safety Functional Mobility and Transfers for ADLs: 
Bed Mobility: 
Rolling: Moderate assistance Supine to Sit: Moderate assistance Transfers: 
Sit to Stand: Minimum assistance;Assist x1;Additional time Bed to Chair: Minimum assistance Balance: 
Sitting: Intact Sitting - Static: Good (unsupported) Sitting - Dynamic: Good (unsupported) Standing: Impaired Standing - Static: Fair;Constant support Standing - Dynamic : Fair ADL Intervention: Total A to manage colostomy, including dumping contents, clean up, and sealing. Grooming Grooming Assistance: Contact guard assistance;Minimum assistance Position Performed: Standing(at sink) Washing Hands: Contact guard assistance Cues: Verbal cues provided Upper Body Dressing Assistance Hospital Gown: Set-up; Minimum  assistance(Pt able to reach up to manage sleeves on bilateral shoulders) Cues: Verbal cues provided Cognitive Retraining Safety/Judgement: Decreased awareness of need for safety Rolling Walker Safety: Educated patient about importance of keeping hands free at all times when using rolling walker for fall prevention. Instructed patient on safe and appropriate hand placement during transfers (push up from sitting surface when standing up, reach back for sitting surface when sitting down, use grab bars, etc.), including never to pull up on rolling walker for fall prevention and safety. Instructed patient to push rolling walker up to surface (countertop, sink, etc.) and  it as opposed to abandoning rolling walker on the side for safety. Patient verbalized understanding of same. Pain: 
Pt reporting minimal abdominal pain this session. Activity Tolerance:  
Good, Fair, SpO2 stable on RA and requires rest breaks Please refer to the flowsheet for vital signs taken during this treatment. After treatment patient left in no apparent distress:  
Sitting in chair, Call bell within reach, Bed / chair alarm activated and RN present COMMUNICATION/COLLABORATION:  
The patients plan of care was discussed with: Physical therapist and Registered nurse. Mukesh Cristobal OT Time Calculation: 35 mins

## 2020-05-10 NOTE — PROGRESS NOTES
Trey Kincaid [facility] 80 
[address] [facility], [address] [place] [address] 
[phone] Medical Progress Note NAME: Adele Dale :  1940 MRM:  [id number] Date/Time of service: 5/10/2020  11:34 AM 
 
  
Subjective: Chief Complaint:  Patient was personally seen and examined by me during this time period. Chart reviewed. Tolerating diet. Still with weakness Objective:  
 
 
Vitals:  
 
 
Last 24hrs VS reviewed since prior progress note. Most recent are: 
 
Visit Vitals /86 (BP 1 Location: Right arm, BP Patient Position: At rest) Pulse 61 Temp 98.8 °F (37.1 °C) Resp 17 Ht 5' 8\" (1.727 m) Wt 89.4 kg (197 lb 1.5 oz) SpO2 98% BMI 29.97 kg/m² SpO2 Readings from Last 6 Encounters:  
05/10/20 98% Intake/Output Summary (Last 24 hours) at 5/10/2020 1008 Last data filed at 5/10/2020 7843 Gross per 24 hour Intake 1410 ml Output 4120 ml Net -2710 ml Exam:  
 
Physical Exam: 
 
Gen:  Elderly, disheveled, NAD HEENT:  Pink conjunctivae, PERRL, hearing intact to voice, moist mucous membranes Neck:  Supple, without masses, thyroid non-tender Resp:  No accessory muscle use, clear breath sounds without wheezes rales or rhonchi 
Card:  No murmurs, normal S1, S2 without thrills, bruits or peripheral edema Abd:  Soft, mild diffused pain, non-distended, normoactive bowel sounds are present, colostomy intact Musc:  No cyanosis or clubbing Skin:  No rashes Neuro:  Cranial nerves 3-12 are grossly intact, follows commands appropriately Psych:  Poor insight, oriented to person Medications Reviewed: (see below) Lab Data Reviewed: (see below) 
 
______________________________________________________________________ Medications:  
 
Current Facility-Administered Medications Medication Dose Route Frequency  potassium chloride SR (KLOR-CON 10) tablet 40 mEq  40 mEq Oral BID  
  TPN ADULT - CENTRAL AA 5% D20% W/ ELECTROLYTES AND CA   IntraVENous CONTINUOUS  
 traMADoL (ULTRAM) tablet 50 mg  50 mg Oral Q6H PRN  
 HYDROmorphone (PF) (DILAUDID) injection 0.5 mg  0.5 mg IntraVENous Q3H PRN  
 glucose chewable tablet 16 g  4 Tab Oral PRN  
 glucagon (GLUCAGEN) injection 1 mg  1 mg IntraMUSCular PRN  
 dextrose 10% infusion 0-250 mL  0-250 mL IntraVENous PRN  
 insulin lispro (HUMALOG) injection   SubCUTAneous Q6H  
 fat emulsion 20% (LIPOSYN, INTRAlipid) infusion 500 mL  500 mL IntraVENous Q MON, WED & FRI  sodium bicarbonate tablet 650 mg  650 mg Oral BID  alteplase (CATHFLO) 1 mg in sterile water (preservative free) 1 mL injection  1 mg InterCATHeter PRN  
 labetaloL (NORMODYNE) tablet 100 mg  100 mg Oral Q12H  
 heparin (porcine) injection 5,000 Units  5,000 Units SubCUTAneous Q8H  
 benzocaine (HURRICANE) 20 % spray   Mucous Membrane PRN  
 sodium chloride (NS) flush 5-40 mL  5-40 mL IntraVENous Q8H  
 sodium chloride (NS) flush 5-40 mL  5-40 mL IntraVENous PRN  
 acetaminophen (TYLENOL) tablet 650 mg  650 mg Oral Q4H PRN  
 ondansetron (ZOFRAN) injection 4 mg  4 mg IntraVENous Q4H PRN  
 labetaloL (NORMODYNE;TRANDATE) 20 mg/4 mL (5 mg/mL) injection 10 mg  10 mg IntraVENous Q4H PRN  pantoprazole (PROTONIX) 40 mg in 0.9% sodium chloride 10 mL injection  40 mg IntraVENous DAILY Lab Review:  
 
Recent Labs 05/09/20 2020 05/08/20 
3224 WBC 11.5* 11.9* HGB 10.5* 10.2* HCT 30.9* 30.3*  140* Recent Labs 05/10/20 
0211 05/09/20 
1420 05/09/20 
0516 05/08/20 
6835  137 142 140  
K 2.7* 4.1 2.4* 2.6*  
* 111* 115* 115* CO2 21 19* 20* 19* * 728* 106* 113* BUN 17 17 20 24* CREA 1.13 1.31* 1.18 1.19 CA 6.8* 6.6* 7.0* 6.9*  
MG 1.6  --  1.8 1.7 PHOS 2.6 5.4* 2.4* 2.4* ALB  --  1.4* 1.5* 1.5* TBILI  --   --  0.2 0.4 SGOT  --   --  13* 13* ALT  --   --  9* 10* Lab Results Component Value Date/Time Glucose (POC) 96 05/10/2020 05:33 AM  
 Glucose (POC) 102 (H) 05/10/2020 12:03 AM  
 Glucose (POC) 112 (H) 05/09/2020 05:45 PM  
 Glucose (POC) 193 (H) 05/09/2020 03:34 PM  
 Glucose (POC) 99 05/09/2020 11:47 AM  
 
 
  
Assessment / Plan:  
 
66 yo hx of HTN, CKD 3, dementia, presented w/ N/V, VERONIQUE, found to have rectal CA, now s/p diverting colostomy on 05/05 1) Rectal CA w/ large bowel obstruction: no distant mets on CT.  GI, Oncology, and colorectal surg following. S/p diverting colostomy on 05/05. Family was leaning toward hospice rather than treatment 2) Severe pro-kaleb malnutrition: due to rectal cancer. Tolerating diet. Will need to wean TPN. Surgery team is managing. Cont to monitor electrolytes closely for re-feeding 3) VERONIQUE/CKD 3: now improving. Due to dehydration. Cont IVF, TPN. Monitor 4) Enteritis/UTI: UCx with alpha strep. 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 Cont lopez 6) HTN: cont labetalol 7) Dementia: monitor for delirium 8) HypoK/phos/Mg: cont IV repletion prn 
 
9) Debility: cont PT/OT. Awaiting [facility] vs SNF Total time spent with patient: 25 min **I personally saw and examined the patient during this time period** Care Plan discussed with: Patient, nursing Discussed:  Care Plan Prophylaxis:  Hep SQ Disposition:  [facility]/Rehab vs SNF 
        
___________________________________________________ Attending Physician: Renetta Rivers MD

## 2020-05-10 NOTE — PROGRESS NOTES
Problem: Mobility Impaired (Adult and Pediatric) Goal: *Acute Goals and Plan of Care (Insert Text) Description: FUNCTIONAL STATUS PRIOR TO ADMISSION: Patient was modified independent for functional mobility and ADLs per patient, still drives. HOME SUPPORT PRIOR TO ADMISSION: The patient lived with family members but did not require assist. 
 
Physical Therapy Goals Initiated 5/1/2020; continue same goals as of 5/8/2020 1. Patient will move from supine to sit and sit to supine , scoot up and down and roll side to side in bed with independence within 7 day(s). 2.  Patient will transfer from bed to chair and chair to bed with independence using the least restrictive device within 7 day(s). 3.  Patient will perform sit <> stand with independence within 7 day(s). 4.  Patient will ambulate with independence for 150 feet with the least restrictive device within 7 day(s). Outcome: Progressing Towards Goal 
 PHYSICAL THERAPY TREATMENT Patient: Chris Sanchez (28 y.o. male) Date: 5/10/2020 Diagnosis: Enteritis [K52.9] <principal problem not specified> Procedure(s) (LRB): OPEN DIVERTING COLOSTOMY CREATION (N/A) 5 Days Post-Op Precautions: Contact, Fall Chart, physical therapy assessment, plan of care and goals were reviewed. ASSESSMENT Patient continues with skilled PT services and is progressing towards goals. Pt motivated to work with therapy today. Requires Mod A for bed mobility d/t core weakness and initially impaired sitting balance. Min A x 1 to stand to RW. Pt ambulated within the room with decreased step clearance and occasional difficulty clearing LLE. Following seated rest break pt able to walk into bathroom to stand and wash hands at the sink. Left up in the chair with RN in room to address leaking ostomy. Pt limited by confusion, decreased insight and generalized weakness.  If pt were to return home he will need hands on assistance with all transfers, mobility, ADLs and ostomy management. Current Level of Function Impacting Discharge (mobility/balance): Mod A bed mobility Other factors to consider for discharge: unable to manage ostomy, dementia, high fall risk, patient likely is not safe to drive anymore PLAN : 
Patient continues to benefit from skilled intervention to address the above impairments. Continue treatment per established plan of care. to address goals. Recommendation for discharge: (in order for the patient to meet his/her long term goals) Therapy up to 5 days/week in SNF setting vs HHPT and assistance with mobility and ADLs This discharge recommendation: 
Has been made in collaboration with the attending provider and/or case management IF patient discharges home will need the following DME: rolling walker SUBJECTIVE:  
Patient stated Im feeling better.  OBJECTIVE DATA SUMMARY:  
Critical Behavior: 
Neurologic State: Alert(intermittent confusion) Orientation Level: Oriented to person, Oriented to place, Oriented to time(able to state month and year; knows he had a surgery) Cognition: Follows commands Safety/Judgement: Decreased awareness of environment, Decreased awareness of need for assistance, Fall prevention, Decreased insight into deficits Functional Mobility Training: 
Bed Mobility: 
Rolling: Moderate assistance Supine to Sit: Moderate assistance Transfers: 
Sit to Stand: Minimum assistance;Assist x1;Additional time Stand to Sit: Minimum assistance Bed to Chair: Minimum assistance Balance: 
Sitting: Intact Sitting - Static: Good (unsupported) Sitting - Dynamic: Good (unsupported) Standing: Impaired Standing - Static: Fair;Constant support Standing - Dynamic : Fair Ambulation/Gait Training: 
Distance (ft): 35 Feet (ft)(additional 15 ft after rest break) Assistive Device: Walker, rolling;Gait belt Ambulation - Level of Assistance: Minimal assistance Gait Abnormalities: Decreased step clearance; Step to gait Base of Support: Widened Speed/[place]: Pace decreased (<100 feet/min); Slow Step Length: Right shortened;Left shortened Activity Tolerance:  
Good and requires rest breaks Please refer to the flowsheet for vital signs taken during this treatment. After treatment patient left in no apparent distress:  
Sitting in chair, Call bell within reach, and Bed / chair alarm activated COMMUNICATION/COLLABORATION:  
The patients plan of care was discussed with: Registered nurse. Lindy Larose, PT Time Calculation: 36 mins

## 2020-05-10 NOTE — PROGRESS NOTES
Problem: Falls - Risk of 
Goal: *Absence of Falls Description: Document Clydene Bone Fall Risk and appropriate interventions in the flowsheet. Outcome: Progressing Towards Goal 
Note: Fall Risk Interventions: 
Mobility Interventions: Bed/chair exit alarm, PT Consult for mobility concerns Mentation Interventions: Bed/chair exit alarm, Door open when patient unattended Medication Interventions: Bed/chair exit alarm Elimination Interventions: Bed/chair exit alarm, Call light in reach History of Falls Interventions: Bed/chair exit alarm Problem: Pressure Injury - Risk of 
Goal: *Prevention of pressure injury Description: Document Baltazar Scale and appropriate interventions in the flowsheet. Outcome: Progressing Towards Goal 
Note: Pressure Injury Interventions: 
Sensory Interventions: Assess changes in LOC, Minimize linen layers Moisture Interventions: Check for incontinence Q2 hours and as needed, Contain wound drainage, Internal/External fecal devices, Internal/External urinary devices, Limit adult briefs, Minimize layers Activity Interventions: Assess need for specialty bed, Pressure redistribution bed/mattress(bed type), PT/OT evaluation Mobility Interventions: Assess need for specialty bed, PT/OT evaluation Nutrition Interventions: Offer support with meals,snacks and hydration, Discuss nutritional consult with provider, Document food/fluid/supplement intake Friction and Shear Interventions: HOB 30 degrees or less, Lift team/patient mobility team

## 2020-05-10 NOTE — PROGRESS NOTES
Bedside shift change report given to Cliff Fernando, (oncoming nurse) by Cyndi Up (offgoing nurse). Report included the following information SBAR, Kardex, Intake/Output, Recent Results and Med Rec Status.

## 2020-05-10 NOTE — PROGRESS NOTES
CRS Note Doing well today, no issues overnight. Tolerating full liquid diet - plan to advance to soft diet - keep tpn for now and h\pefully we can stop this when hes taking more PO. Kerry Albarado MD 
[phone] (cell)

## 2020-05-10 NOTE — PROGRESS NOTES
Bedside and Verbal shift change report given to Alysa Key RN (oncoming nurse) by Ema Vivas RN (offgoing nurse). Report included the following information SBAR, Kardex, Intake/Output, MAR, Accordion and Recent Results.

## 2020-05-11 NOTE — WOUND CARE
Patient is resting in bed with noted thick clear mucous draining from the fistula site. The ostomy appliance was also leaking distally toward the fistula. Ostomy appliance was removed and skin was cleansed with moist washcloth. A small pediatric drain was applied to the distal fistula site (over a hydrocolloid base) and a 2 piece ostomy was applied to fit over the stoma site. Will Follow up with patient later in the day to evaluate the sites and determine any further needs in ostomy/fistula management. 1500: Patient is in room working with PT. Per staff the ostomy started leaking during therapy. Assessed patient and ostomy was leaking from the base of the wafer.  Removed ostomy and fistula appliances, cleansed skin and applied a one piece high output ostomy appliance with 2 openings cut to fit over the stoma and over the fistula.  
  
Will Follow, Consult as needed,  
Maynor ROLDAN RN [facility]

## 2020-05-11 NOTE — PROGRESS NOTES
5/11/2020   CARE MANAGEMENT NOTE:  CM reviewed EMR for clinical updates (pt off the floor on 5/5 2/2 surgery).  Pt was admitted with enteritis. Reportedly, pt resides with his dtr, Trinh Rosas ([phone]).   
RUR 20%; [facility] 37 XDOU 
  
Transition Plan of Care: 1.  Discussed rehab options with dtr and she agrees to [facility], or SNF ([facility], and [facility]). Referrals have been sent for review. Pt will need to be off of TPN. If, pt is accepted to [facility] they request one Covid test and three days of pt being afebrile. 2  Dtr will provide transportation at discharge. 
  
CM will continue to follow pt for definitive discharge plan: [facility] vs SNF rehab. Araceli

## 2020-05-11 NOTE — PROGRESS NOTES
Palliative Medicine Consult [place]: [phone] ([phone]) Patient Name: Kelsey Mendoza YOB: 1940 Date of Initial Consult: 5/3/2020 Reason for Consult: Care decisions Requesting Provider: Dr. Ilana Blanco Primary Care Physician: Balbina Adams MD 
 
 SUMMARY:  
Kelsey Mendoza is a 67 y.o. with a past history of mild to moderate dementia, possible chronic kidney disease, hypertension, who was admitted on 4/29/2020 from home with a diagnosis of abdominal distention. Current medical issues leading to Palliative Medicine involvement include: Care decisions. Chart reviewpatient is a 52-year-old male admitted to the hospital on 4/30 with increasing abdominal distention, nausea, and vomiting. CT scan showing extensive small and large bowel distention with fluid filled loops of small and large bowel. There appeared to be severe enteritis as well as distal circumferential rectal wall thickening. Patient subsequently had endoscopic evaluation which was concerning for rectal carcinoma. He also has been seen by colorectal surgery who subsequently thinks he will need a diverting colostomy secondary to ongoing abdominal distention. Our team is been asked to see him for goals of care Social history patient apparently lived in [place]. 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 Apparently has 3 sons in the [place] area. He lives with his stepdaughter, Lenny Phan. He was  to her mom for 20+ years. She states she has a routine for him at home. She usually gets them up around 730. He continues to dress like he is going to work every day. But she is noticed over the last several weeks that has had decreased p.o. intake and much more fatigued. PALLIATIVE DIAGNOSES:  
1. Goals of care discussion 2. DNR discussion 3. Suspected rectal cancer 4. Mild-moderate dementia 5. Debility 6. Advance care planning 7. Mid back painlikely from the bed. He did not have this prior to coming to the hospital 
 
 
 
 PLAN:  
1. Met with patient. He is experiencing some mid back pain but also having significant amount of leakage from his ostomy. Wound care about enter the room. He just finished working with therapy and actually doing fairly well. Have reviewed the chart since I last saw him and the plan appears to be weaning off TPN todaypotential skilled care in the next day or 2. Patient without complaints right now. 2. Attempted to talk with stepdaughtercalled her cell phone but she did not answer. Unfortunately voicemail box is full and I cannot leave a message. Wanted to touch base about overall plan for patient. Once again, appears initial goal is attempting skilled care. We talked about hospice support when he goes home since she did not feel he would tolerate treatment options. Want to be sure that she still agrees on next steps. 3. Goals of care attempted to talk with stepdaughter. We will need to clarify overall goals to be sure nothing is changed since we talked last week. 4. Advance care planning explained the importance to the stepdaughter the need for us to see medical power of  paperwork. Explained to her that in this state, if there is no paperwork that assigns her as medical POA, then we would need to talk with his 3 sons in [place]. Apparently, they have had little involvement in his life according to his stepdaughter. She states she will fax the paperwork to our office. She is hoping to have that to us within the next 12 to 24 hours. We still have not seen the advanced medical directive. 5. CODE STATUS reviewed resuscitation with his stepdaughter and the limitations of CPR/intubation in patients of Mr. Fitzpatrick's age and his comorbidities. She does agree on changing his CODE STATUS to no attempts at resuscitation after discussing further.   She also had prayed about this over the last several days. 6. Symptom management patient having some mild abdominal discomfort as well as mid back discomfort. In the chart, he has Dilaudid 0.5 mg IV every 3 hours as needed and Ultram 50 mg every 6 hours. Dilaudid 0.5 mg IV equals approximately 10 mg p.o. morphine. 50 mg Ultram equals approximately 5 mg p.o. morphine. At this point, I am not sure I would adjust any medication and see how the Ultram works before adjusting the dose. 7. Psychosocialpatient does appear to have good support from his stepdaughter. No spiritual concerns identified 8. Discussed with bedside nurse, Nichole-Oncology/NP 9. Initial consult note routed to primary continuity provider and/or primary health care team members 10. Communicated plan of care with: Palliative [facility] Team 
 
 GOALS OF CARE / TREATMENT PREFERENCES:  
 
GOALS OF CARE: 
Patient/Health Care Proxy Stated Goals: Other (comment)(Waiting pathology before making a final decision on goals of care) TREATMENT PREFERENCES:  
Code Status: DNR Advance Care Planning: 
[x] [facility] Interdisciplinary Team has updated the ACP Navigator with [place] and Patient Capacity Advance Care Planning 5/7/2020 Patient's Healthcare Decision Maker is: Named in scanned ACP document Confirm Advance Directive - Patient Would Like to Complete Advance Directive - Medical Interventions: Full interventions Other Instructions: Other: As far as possible, the palliative care team has discussed with patient / health care proxy about goals of care / treatment preferences for patient. HISTORY:  
 
History obtained from: Chart, patient, stepdaughter CHIEF COMPLAINT: Nausea vomiting HPI/SUBJECTIVE: The patient is:  
[x] Verbal and participatory [] Non-participatory due to:  
Patient states he feels fine. He denies any pain. 5/6-patient still unclear of medical events. Denies any pain 5/11.  Patient appears comfortable. Denies any pain right now. Ostomy having some issues with leakage. Therapy just finished working with him Clinical Pain Assessment (nonverbal scale for severity on nonverbal patients):  
Clinical Pain Assessment Severity: 0 Activity (Movement): Lying quietly, normal position Duration: for how long has pt been experiencing pain (e.g., 2 days, 1 month, years) Frequency: how often pain is an issue (e.g., several times per day, once every few days, constant) FUNCTIONAL ASSESSMENT:  
 
Palliative Performance Scale (PPS): PPS: 50 PSYCHOSOCIAL/SPIRITUAL SCREENING:  
 
Palliative IDT has assessed this patient for cultural preferences / practices and a referral made as appropriate to needs (Cultural Services, Patient Advocacy, Ethics, etc.) Any spiritual / Hinduism concerns: 
[] Yes /  [x] No 
 
Caregiver Burnout: 
[] Yes /  [] No /  [x] No Caregiver Present Anticipatory grief assessment:  
[x] Normal  / [] Maladaptive ESAS Anxiety: Anxiety: 0 
 
ESAS Depression: Depression: 0 REVIEW OF SYSTEMS:  
 
Positive and pertinent negative findings in ROS are noted above in HPI. The following systems were [x] reviewed / [] unable to be reviewed as noted in HPI Other findings are noted below. Systems: constitutional, ears/nose/mouth/throat, respiratory, gastrointestinal, genitourinary, musculoskeletal, integumentary, neurologic, psychiatric, endocrine. Positive findings noted below. Modified ESAS Completed by: provider Fatigue: 2 Drowsiness: 1 Depression: 0 Pain: 0 Anxiety: 0 Nausea: 4 Anorexia: 2 Dyspnea: 0 Constipation: No  
     
 
 
 PHYSICAL EXAM:  
 
From RN flowsheet: 
Wt Readings from Last 3 Encounters:  
05/11/20 204 lb 6.4 oz (92.7 kg) Blood pressure 126/77, pulse 67, temperature 98.6 °F (37 °C), resp. rate 18, height 5' 8\" (1.727 m), weight 204 lb 6.4 oz (92.7 kg), SpO2 98 %. Pain Scale 1: Numeric (0 - 10) Pain Intensity 1: 7 Pain Onset 1: acute Pain Location 1: Back Pain Orientation 1: Posterior Pain Description 1: Aching Pain Intervention(s) 1: Medication (see MAR) Last bowel movement, if known:  
 
Constitutional: No acute distress, appears calm, alert to person Eyes: pupils equal, anicteric ENMT: no nasal discharge, moist mucous membranes Cardiovascular: regular rhythm, distal pulses intact Respiratory: breathing not labored, symmetric Gastrointestinal: soft, ostomy and fistula site noted Musculoskeletal: no deformity, no tenderness to palpation Skin: warm, dry Neurologic: following commands, moving all extremities Psychiatric: full affect, no hallucinations Other: 
 
 
 HISTORY:  
 
Active Problems: 
  Enteritis (4/30/2020) Renal insufficiency (4/30/2020) Dementia ([address] (4/30/2020) Elevated troponin (4/30/2020) Leukocytosis (4/30/2020) HTN (hypertension) (4/30/2020) Past Medical History:  
Diagnosis Date  History of vascular access device 05/04/2020 [facility] VAT 5Fr Triple PICC Right brachial 43cm for TPN Past Surgical History:  
Procedure Laterality Date 2021 Dee Becerra N/A 5/1/2020 SIGMOIDOSCOPY FLEXIBLE performed by Sid Good MD at [address] History reviewed. No pertinent family history. History reviewed, no pertinent family history. Social History Tobacco Use  Smoking status: Former Smoker  Smokeless tobacco: Never Used Substance Use Topics  Alcohol use: Not on file No Known Allergies Current Facility-Administered Medications Medication Dose Route Frequency  TPN ADULT - CENTRAL AA 5% D20% W/ ELECTROLYTES AND CA   IntraVENous CONTINUOUS  
 TPN ADULT - CENTRAL AA 5% D20% W/ ELECTROLYTES AND CA   IntraVENous CONTINUOUS  
 traMADoL (ULTRAM) tablet 50 mg  50 mg Oral Q6H PRN  
 HYDROmorphone (PF) (DILAUDID) injection 0.5 mg  0.5 mg IntraVENous Q3H PRN  
  glucose chewable tablet 16 g  4 Tab Oral PRN  
 glucagon (GLUCAGEN) injection 1 mg  1 mg IntraMUSCular PRN  
 dextrose 10% infusion 0-250 mL  0-250 mL IntraVENous PRN  
 insulin lispro (HUMALOG) injection   SubCUTAneous Q6H  
 fat emulsion 20% (LIPOSYN, INTRAlipid) infusion 500 mL  500 mL IntraVENous Q MON, WED & FRI  sodium bicarbonate tablet 650 mg  650 mg Oral BID  alteplase (CATHFLO) 1 mg in sterile water (preservative free) 1 mL injection  1 mg InterCATHeter PRN  
 labetaloL (NORMODYNE) tablet 100 mg  100 mg Oral Q12H  
 heparin (porcine) injection 5,000 Units  5,000 Units SubCUTAneous Q8H  
 benzocaine (HURRICANE) 20 % spray   Mucous Membrane PRN  
 sodium chloride (NS) flush 5-40 mL  5-40 mL IntraVENous Q8H  
 sodium chloride (NS) flush 5-40 mL  5-40 mL IntraVENous PRN  
 acetaminophen (TYLENOL) tablet 650 mg  650 mg Oral Q4H PRN  
 ondansetron (ZOFRAN) injection 4 mg  4 mg IntraVENous Q4H PRN  
 labetaloL (NORMODYNE;TRANDATE) 20 mg/4 mL (5 mg/mL) injection 10 mg  10 mg IntraVENous Q4H PRN  pantoprazole (PROTONIX) 40 mg in 0.9% sodium chloride 10 mL injection  40 mg IntraVENous DAILY  
 
 
 
 LAB AND IMAGING FINDINGS:  
 
Lab Results Component Value Date/Time WBC 10.5 05/11/2020 03:04 AM  
 HGB 10.5 (L) 05/11/2020 03:04 AM  
 PLATELET 495 97/54/8226 03:04 AM  
 
Lab Results Component Value Date/Time Sodium 143 05/11/2020 03:04 AM  
 Potassium 3.3 (L) 05/11/2020 03:04 AM  
 Chloride 115 (H) 05/11/2020 03:04 AM  
 CO2 22 05/11/2020 03:04 AM  
 BUN 18 05/11/2020 03:04 AM  
 Creatinine 1.01 05/11/2020 03:04 AM  
 Calcium 7.3 (L) 05/11/2020 03:04 AM  
 Magnesium 2.0 05/11/2020 03:04 AM  
 Phosphorus 2.3 (L) 05/11/2020 03:04 AM  
  
Lab Results Component Value Date/Time AST (SGOT) 13 (L) 05/09/2020 05:16 AM  
 Alk.  phosphatase 43 (L) 05/09/2020 05:16 AM  
 Protein, total 4.8 (L) 05/09/2020 05:16 AM  
 Albumin 1.4 (L) 05/09/2020 02:20 PM  
 Globulin 3.3 05/09/2020 05:16 AM  
 
 No results found for: INR, PTMR, PTP, PT1, PT2, APTT, INREXT, INREXT No results found for: IRON, FE, TIBC, IBCT, PSAT, FERR No results found for: PH, PCO2, PO2 No components found for: [place] No results found for: CPK, CKMB Total time: 35 
Counseling / coordination time, spent as noted above: 25 
> 50% counseling / coordination?: yes Prolonged service was provided for  []30 min   []75 min in face to face time in the presence of the patient, spent as noted above. Time Start:  
Time End:  
Note: this can only be billed with 63739 (initial) or 42347 (follow up). If multiple start / stop times, list each separately.

## 2020-05-11 NOTE — PROGRESS NOTES
Problem: Mobility Impaired (Adult and Pediatric) Goal: *Acute Goals and Plan of Care (Insert Text) Description: FUNCTIONAL STATUS PRIOR TO ADMISSION: Patient was modified independent for functional mobility and ADLs per patient, still drives. HOME SUPPORT PRIOR TO ADMISSION: The patient lived with family members but did not require assist. 
 
Physical Therapy Goals Initiated 5/1/2020; continue same goals as of 5/8/2020 1. Patient will move from supine to sit and sit to supine , scoot up and down and roll side to side in bed with independence within 7 day(s). 2.  Patient will transfer from bed to chair and chair to bed with independence using the least restrictive device within 7 day(s). 3.  Patient will perform sit <> stand with independence within 7 day(s). 4.  Patient will ambulate with independence for 150 feet with the least restrictive device within 7 day(s). Outcome: Progressing Towards Goal 
 PHYSICAL THERAPY TREATMENT Patient: Kelsey Mendoza (37 y.o. male) Date: 5/11/2020 Diagnosis: Enteritis [K52.9] <principal problem not specified> Procedure(s) (LRB): OPEN DIVERTING COLOSTOMY CREATION (N/A) 6 Days Post-Op Precautions: Contact, Fall Chart, physical therapy assessment, plan of care and goals were reviewed. ASSESSMENT Patient continues with skilled PT services and is progressing towards goals. Noted colostomy is full called nurse and emptied colostomy bag noted it was leaking. Rolled on the edge of bed with min assist. Sit to stand CGA, however when standing patient stated he feels that he is leaking again. Assisted patient back to bed supine and reposition up high on the bed. Wound nurse came and will reseal the colostomy bag. Nurse aware and agreed to monitor patient. Current Level of Function Impacting Discharge (mobility/balance): min assist with bed mobility and sit to stand Other factors to consider for discharge: pain PLAN : 
 Patient continues to benefit from skilled intervention to address the above impairments. Continue treatment per established plan of care. to address goals. Recommendation for discharge: (in order for the patient to meet his/her long term goals) Therapy up to 5 days/week in SNF setting or intensive home health therapy program 
 
This discharge recommendation: 
Has been made in collaboration with the attending provider and/or case management IF patient discharges home will need the following DME: patient owns DME required for discharge SUBJECTIVE:  
Patient stated ok.  OBJECTIVE DATA SUMMARY:  
Critical Behavior: 
Neurologic State: Alert Orientation Level: Oriented to person, Oriented to place Cognition: Follows commands Safety/Judgement: Decreased awareness of environment, Decreased awareness of need for safety, Decreased insight into deficits, Fall prevention Functional Mobility Training: 
Bed Mobility: 
Rolling: Contact guard assistance;Minimum assistance Supine to Sit: Minimum assistance; Additional time Sit to Supine: Moderate assistance Scooting: Contact guard assistance(pt able to scoot along EOB) Transfers: 
Sit to Stand: Contact guard assistance;Assist x2; Additional time Stand to Sit: Minimum assistance Balance: 
Sitting: Intact Sitting - Static: Good (unsupported) Sitting - Dynamic: Good (unsupported) Standing: Impaired; With support Standing - Static: Constant support;Good Standing - Dynamic : Fair;Constant support Ambulation/Gait Training: 
  
  
  
  
  
  
  
 
   
 
Therapeutic Exercises:  
 Instructed patient to continue active range of motion exercise on both legs while up on chair or on bed. Pain Ratin/10 Activity Tolerance:  
Good Please refer to the flowsheet for vital signs taken during this treatment. After treatment patient left in no apparent distress: Supine in bed, Heels elevated for pressure relief, Call bell within reach, Bed / chair alarm activated, and Side rails x 3 
 
COMMUNICATION/COLLABORATION:  
The patients plan of care was discussed with: Occupational therapist, Registered nurse, and Physician. Parminder Read PT,WCC. Time Calculation: 24 mins

## 2020-05-11 NOTE — PROGRESS NOTES
Trey Varghese [place] [address] Kelsey Mendoza YOB: 1940 Assessment & Plan: VERONIQUE due to IVVD, improved 
-cr normal 
Hypokalemia 
- being repleted Acidosis - resolved Colon mass s/p diverting colostomy Rec: 
Receiving K repletion We will see PRN. Please call with ?s Thank you for allowing us to participate in this patient's care. Subjective:  
CC: f/u VERONIQUE 
HPI: Renal function better,k being repleted/ 
ROS: no sob/n/v Current Facility-Administered Medications Medication Dose Route Frequency  potassium phosphate 30 mmol in 0.9% sodium chloride 250 mL infusion   IntraVENous ONCE  
 TPN ADULT - CENTRAL AA 5% D20% W/ ELECTROLYTES AND CA   IntraVENous CONTINUOUS  
 TPN ADULT - CENTRAL AA 5% D20% W/ ELECTROLYTES AND CA   IntraVENous CONTINUOUS  
 traMADoL (ULTRAM) tablet 50 mg  50 mg Oral Q6H PRN  
 HYDROmorphone (PF) (DILAUDID) injection 0.5 mg  0.5 mg IntraVENous Q3H PRN  
 glucose chewable tablet 16 g  4 Tab Oral PRN  
 glucagon (GLUCAGEN) injection 1 mg  1 mg IntraMUSCular PRN  
 dextrose 10% infusion 0-250 mL  0-250 mL IntraVENous PRN  
 insulin lispro (HUMALOG) injection   SubCUTAneous Q6H  
 fat emulsion 20% (LIPOSYN, INTRAlipid) infusion 500 mL  500 mL IntraVENous Q MON, WED & FRI  sodium bicarbonate tablet 650 mg  650 mg Oral BID  alteplase (CATHFLO) 1 mg in sterile water (preservative free) 1 mL injection  1 mg InterCATHeter PRN  
 labetaloL (NORMODYNE) tablet 100 mg  100 mg Oral Q12H  
 heparin (porcine) injection 5,000 Units  5,000 Units SubCUTAneous Q8H  
 benzocaine (HURRICANE) 20 % spray   Mucous Membrane PRN  
 sodium chloride (NS) flush 5-40 mL  5-40 mL IntraVENous Q8H  
 sodium chloride (NS) flush 5-40 mL  5-40 mL IntraVENous PRN  
 acetaminophen (TYLENOL) tablet 650 mg  650 mg Oral Q4H PRN  
 ondansetron (ZOFRAN) injection 4 mg  4 mg IntraVENous Q4H PRN  
  labetaloL (NORMODYNE;TRANDATE) 20 mg/4 mL (5 mg/mL) injection 10 mg  10 mg IntraVENous Q4H PRN  pantoprazole (PROTONIX) 40 mg in 0.9% sodium chloride 10 mL injection  40 mg IntraVENous DAILY Objective:  
 
Vitals: 
Blood pressure (!) 168/92, pulse 64, temperature 98 °F (36.7 °C), resp. rate 18, height 5' 8\" (1.727 m), weight 89.4 kg (197 lb 1.5 oz), SpO2 96 %. Temp (24hrs), Av.1 °F (36.7 °C), Min:97.6 °F (36.4 °C), Max:98.8 °F (37.1 °C) Intake and Output: 
No intake/output data recorded.  1901 -  0700 In: 2111.3 [P.O.:250; I.V.:961.3] Out: 8170 [Urine:2325; Drains:285] Physical Exam:              
GENERAL ASSESSMENT: NAD 
CHEST: CTA HEART: S1S2 ABDOMEN: soft NT 
: +lopez +urine EXTREMITY: no EDEMA 
 
   
ECG/rhythm: 
 
Data Review No results for input(s): TNIPOC in the last 72 hours. No lab exists for component: ITNL No results for input(s): CPK, CKMB, TROIQ in the last 72 hours. Recent Labs 20 
0304 05/10/20 
0211 20 
1420 20 
5560  143 137 142  
K 3.3* 2.7* 4.1 2.4*  
* 115* 111* 115* CO2 22 21 19* 20* BUN 18 17 17 20 CREA 1.01 1.13 1.31* 1.18  
* 106* 728* 106* PHOS 2.3* 2.6 5.4* 2.4* MG 2.0 1.6  --  1.8  
CA 7.3* 6.8* 6.6* 7.0* ALB  --   --  1.4* 1.5* WBC 10.5  --   --  11.5* HGB 10.5*  --   --  10.5* HCT 32.1*  --   --  30.9*  
  --   --  155 No results for input(s): INR, PTP, APTT, INREXT, INREXT in the last 72 hours. Needs: urine analysis, urine sodium, protein and creatinine Lab Results Component Value Date/Time Sodium,urine random 22 2020 11:32 AM  
 Creatinine, urine 280.00 2020 11:31 AM  
 
 
 
 
: Norman Ramos MD 
2020 [facility]: 
[email or web address] [place] office: 
[address], [address], [address] [place] [address] Phone: [phone] Fax :     [phone] [place] office: 
[address] [facility], [address] Phone - [phone] Fax - [phone]

## 2020-05-11 NOTE — PROGRESS NOTES
Trey Kincaid [facility] 80 
6[address] [facility], [address] [place] [address] 
[phone] Medical Progress Note NAME: Marleny Dominique :  1940 MRM:  [id number] Date/Time of service: 2020  8:48 AM 
 
  
Subjective: Chief Complaint:  Patient was personally seen and examined by me during this time period. Chart reviewed. C/o back pain this AM.  Tolerated breakfast  
 
  
Objective:  
 
 
Vitals:  
 
 
Last 24hrs VS reviewed since prior progress note. Most recent are: 
 
Visit Vitals BP (!) 168/92 (BP 1 Location: Left arm, BP Patient Position: At rest;Supine) Pulse 64 Temp 98 °F (36.7 °C) Resp 18 Ht 5' 8\" (1.727 m) Wt 89.4 kg (197 lb 1.5 oz) SpO2 96% BMI 29.97 kg/m² SpO2 Readings from Last 6 Encounters:  
20 96% Intake/Output Summary (Last 24 hours) at 2020 0848 Last data filed at 2020 6940 Gross per 24 hour Intake 701.25 ml Output 3025 ml Net -2323.75 ml Exam:  
 
Physical Exam: 
 
Gen:  Elderly, disheveled, NAD HEENT:  Pink conjunctivae, PERRL, hearing intact to voice, moist mucous membranes Neck:  Supple, without masses, thyroid non-tender Resp:  No accessory muscle use, clear breath sounds without wheezes rales or rhonchi 
Card:  No murmurs, normal S1, S2 without thrills, bruits or peripheral edema Abd:  Soft, mild diffused pain, non-distended, normoactive bowel sounds are present, colostomy intact Musc:  No cyanosis or clubbing Skin:  No rashes Neuro:  Cranial nerves 3-12 are grossly intact, follows commands appropriately Psych:  Poor insight, oriented to person Medications Reviewed: (see below) Lab Data Reviewed: (see below) 
 
______________________________________________________________________ Medications:  
 
Current Facility-Administered Medications Medication Dose Route Frequency  potassium phosphate 30 mmol in 0.9% sodium chloride 250 mL infusion   IntraVENous ONCE  
  TPN ADULT - CENTRAL AA 5% D20% W/ ELECTROLYTES AND CA   IntraVENous CONTINUOUS  
 TPN ADULT - CENTRAL AA 5% D20% W/ ELECTROLYTES AND CA   IntraVENous CONTINUOUS  
 traMADoL (ULTRAM) tablet 50 mg  50 mg Oral Q6H PRN  
 HYDROmorphone (PF) (DILAUDID) injection 0.5 mg  0.5 mg IntraVENous Q3H PRN  
 glucose chewable tablet 16 g  4 Tab Oral PRN  
 glucagon (GLUCAGEN) injection 1 mg  1 mg IntraMUSCular PRN  
 dextrose 10% infusion 0-250 mL  0-250 mL IntraVENous PRN  
 insulin lispro (HUMALOG) injection   SubCUTAneous Q6H  
 fat emulsion 20% (LIPOSYN, INTRAlipid) infusion 500 mL  500 mL IntraVENous Q MON, WED & FRI  sodium bicarbonate tablet 650 mg  650 mg Oral BID  alteplase (CATHFLO) 1 mg in sterile water (preservative free) 1 mL injection  1 mg InterCATHeter PRN  
 labetaloL (NORMODYNE) tablet 100 mg  100 mg Oral Q12H  
 heparin (porcine) injection 5,000 Units  5,000 Units SubCUTAneous Q8H  
 benzocaine (HURRICANE) 20 % spray   Mucous Membrane PRN  
 sodium chloride (NS) flush 5-40 mL  5-40 mL IntraVENous Q8H  
 sodium chloride (NS) flush 5-40 mL  5-40 mL IntraVENous PRN  
 acetaminophen (TYLENOL) tablet 650 mg  650 mg Oral Q4H PRN  
 ondansetron (ZOFRAN) injection 4 mg  4 mg IntraVENous Q4H PRN  
 labetaloL (NORMODYNE;TRANDATE) 20 mg/4 mL (5 mg/mL) injection 10 mg  10 mg IntraVENous Q4H PRN  pantoprazole (PROTONIX) 40 mg in 0.9% sodium chloride 10 mL injection  40 mg IntraVENous DAILY Lab Review:  
 
Recent Labs 05/11/20 
0304 05/09/20 
6744 WBC 10.5 11.5* HGB 10.5* 10.5* HCT 32.1* 30.9*  
 155 Recent Labs 05/11/20 
0304 05/10/20 
0211 05/09/20 
1420 05/09/20 
8876  143 137 142  
K 3.3* 2.7* 4.1 2.4*  
* 115* 111* 115* CO2 22 21 19* 20* * 106* 728* 106* BUN 18 17 17 20 CREA 1.01 1.13 1.31* 1.18  
CA 7.3* 6.8* 6.6* 7.0*  
MG 2.0 1.6  --  1.8 PHOS 2.3* 2.6 5.4* 2.4* ALB  --   --  1.4* 1.5* TBILI  --   --   --  0.2 SGOT  --   --   --  13* ALT  --   --   --  9* Lab Results Component Value Date/Time Glucose (POC) 100 05/11/2020 05:28 AM  
 Glucose (POC) 117 (H) 05/10/2020 11:37 PM  
 Glucose (POC) 98 05/10/2020 05:09 PM  
 Glucose (POC) 119 (H) 05/10/2020 11:31 AM  
 Glucose (POC) 96 05/10/2020 05:33 AM  
 
 
  
Assessment / Plan:  
 
66 yo hx of HTN, CKD 3, dementia, presented w/ N/V, VERONIQUE, found to have rectal CA, now s/p diverting colostomy on 05/05 1) Rectal CA w/ large bowel obstruction: no distant mets on CT.  GI, Oncology, and colorectal surg following. S/p diverting colostomy on 05/05. Rest of management per Oncology 2) Severe pro-kaleb malnutrition: due to rectal cancer. Tolerating diet. Cont to weanTPN. Surgery team is managing. Cont to monitor electrolytes closely for re-feeding 3) VERONIQUE/CKD 3: now improved. Due to dehydration. Cont IVF, TPN. Monitor 4) Enteritis/UTI: UCx with alpha strep. 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 Cont lopez 6) HTN: cont labetalol 7) Dementia: no agitation. Poor comprehension of illness. Monitor for delirium 8) HypoK/phos/Mg: cont IV repletion daily 9) Debility: cont PT/OT. Awaiting [facility] vs SNF once off TPN Total time spent with patient: 20 min **I personally saw and examined the patient during this time period** Care Plan discussed with: Patient, nursing, CM Discussed:  Care Plan Prophylaxis:  Hep SQ Disposition:  [facility]/Rehab vs SNF 
        
___________________________________________________ Attending Physician: Vera Quispe MD

## 2020-05-11 NOTE — PROGRESS NOTES
Problem: Self Care Deficits Care Plan (Adult) Goal: *Acute Goals and Plan of Care (Insert Text) Description:  
FUNCTIONAL STATUS PRIOR TO ADMISSION: Pt poor historian; however, reports living with daughter in law, residing on first floor of two story house, with no MARILYN, stating he was Independent with ADLs without use of DME, standing to shower in walk-in shower. Reports he enjoys listening to all music but the \"hillbilly\" kind. HOME SUPPORT: The patient lived with daughter in law who provides PRN ADL/IADL assist. 
 
Occupational Therapy Goals Weekly re-assessment 5/7/2020, patient with decline in function, therefore goals updated below: 1. Patient will perform grooming seated EOB with supervision within 7 day(s). 2.  Patient will perform lower body dressing with supervision using adaptive strategies PRN within 7 day(s). 3.  Patient will perform bathing with minimal assistance within 7 day(s). 4.  Patient will perform toilet transfers with supervision within 7 day(s). 5.  Patient will participate in upper extremity therapeutic exercise/activities with supervision for 5 minutes within 7 day(s). 6.  Patient will utilize energy conservation techniques during functional activities with Min verbal cues within 7 day(s).  
 
Initiated 4/30/2020.   
1.  Patient will perform standing grooming with supervision within 7 day(s)- MET 5/1/20 and upgrade to mod I. 
2.  Patient will perform lower body dressing with supervision within 7 day(s)- MET 5/1/20 and upgrade to mod I. 
3.  Patient will perform bathing with supervision within 7 day(s)- MET 5/1/20 and upgrade to mod I. 
4.  Patient will perform toilet transfers with supervision within 7 day(s)- MET 5/1/20 and upgrade to mod I. 
5.  Patient will perform all aspects of toileting with supervision within 7 day(s)- MET 5/1/20 and upgrade to mod I. 
6.  Patient will participate in upper extremity therapeutic exercise/activities with supervision for 10 minutes within 7 day(s). 7.  Patient will utilize energy conservation techniques during functional activities with Min verbal cues within 7 day(s). Outcome: Progressing Towards Goal 
  
OCCUPATIONAL THERAPY TREATMENT Patient: Gayle Bright (66 y.o. male) Date: 5/11/2020 Diagnosis: Enteritis [K52.9] <principal problem not specified> Procedure(s) (LRB): OPEN DIVERTING COLOSTOMY CREATION (N/A) 6 Days Post-Op Precautions: Contact, Fall Chart, occupational therapy assessment, plan of care, and goals were reviewed. ASSESSMENT Patient continues with skilled OT services and is progressing towards goals. Pt is motivated to participate with therapy, however is limited by leaking colostomy this session. Managed colostomy with total A prior to sitting EOB and pt able to participate in brief ADLs. Stood at [employer] with plan to ambulate to bathroom for standing grooming at sink, however colostomy continued to leak, therefore returned pt to supine and wound care RN preparing to assess. Continue to recommend SNF vs. [facility] assist if able to provide 24/7 care. Current Level of Function Impacting Discharge (ADLs): total A for colostomy mgmt; mod A lower body dressing; set up to min A seated grooming Other factors to consider for discharge: fall risk; colostomy; confusion PLAN : 
Patient continues to benefit from skilled intervention to address the above impairments. Continue treatment per established plan of care. to address goals. Recommend with staff: 1 person assist SPT to bed/chair; OOB to chair for meals and at least 3x/day with goal of 60 minutes at a time Recommend next OT session: serial standing tasks at sink Recommendation for discharge: (in order for the patient to meet his/her long term goals) Therapy up to 5 days/week in SNF setting or an intensive home health therapy program 
 
This discharge recommendation: Has been made in collaboration with the attending provider and/or case management IF patient discharges home will need the following DME: TBD SUBJECTIVE:  
Patient stated I feel like I have to go to the bathroom.  OBJECTIVE DATA SUMMARY:  
Cognitive/Behavioral Status: 
Neurologic State: Alert Orientation Level: Oriented to person;Oriented to place Cognition: Follows commands Perception: Appears intact Perseveration: No perseveration noted Safety/Judgement: Decreased awareness of environment;Decreased awareness of need for safety;Decreased insight into deficits; Fall prevention Functional Mobility and Transfers for ADLs: 
Bed Mobility: 
Rolling: Contact guard assistance;Minimum assistance Supine to Sit: Minimum assistance; Additional time Sit to Supine: Moderate assistance Scooting: Contact guard assistance(pt able to scoot along EOB) Transfers: 
Sit to Stand: Contact guard assistance;Assist x2; Additional time Balance: 
Sitting: Intact Sitting - Static: Good (unsupported) Sitting - Dynamic: Good (unsupported) Standing: Impaired; With support Standing - Static: Constant support;Good Standing - Dynamic : Fair;Constant support ADL Intervention: 
Upper Body Dressing Assistance Hospital Gown: Set-up; Minimum  assistance(pt able to reach to manage sleeves on bilateral shoulders) Cues: Tactile cues provided;Verbal cues provided Lower Body Dressing Assistance Socks: Total assistance (dependent) Leg Crossed Method Used: No 
Position Performed: Supine Cues: Doff;Physical assistance Cognitive Retraining Safety/Judgement: Decreased awareness of environment;Decreased awareness of need for safety;Decreased insight into deficits; Fall prevention Pain: 
Pt reporting minimal pain this session Activity Tolerance:  
Fair and requires rest breaks Please refer to the flowsheet for vital signs taken during this treatment. After treatment patient left in no apparent distress: Supine in bed, Call bell within reach, Bed / chair alarm activated and Side rails x 3 
 
COMMUNICATION/COLLABORATION:  
The patients plan of care was discussed with: Physical therapist, Registered nurse and Physician. Elvira Sands, OT Time Calculation: 20 mins

## 2020-05-11 NOTE — PROGRESS NOTES
CRS Note No issues overnight - bag leaking this morning. Complaining of some back pain. PE 
abd soft, ND, NT 
Ostomy pink and patent - leaking around th ebag Recommendations 
- cont soft diet 
- wean tpn 
- would have palliative care team check in with him regarding medications for back pain

## 2020-05-11 NOTE — PROGRESS NOTES
Bedside and Verbal shift change report given to Melodie Mena rn  (oncoming nurse) by Parul Carrasco  (offgoing nurse). Report included the following information SBAR and Kardex.

## 2020-05-11 NOTE — PROGRESS NOTES
Nutrition Assessment: 
 
RECOMMENDATIONS/INTERVENTION(S):  
1. Wean TPN as PO intake improves. TPN (D20/AA5) @ 75 mL/hr Lipids - 20% @ 42 mL/hr x 12 hrs, 3 times per week Goal provides 2016 kcal and 90 g Pro - meeting 100% of kcal and protein needs. Check triglycerides prior to first lipid administration. Check triglycerides weekly while on lipids. 2. Continue GI lite diet as tolerated. 3. Continue to monitor intakes, wt changes, labs, GI, plan of care. ASSESSMENT:  
5/11: F/u. Diet advanced to GI lite. Remains on TPN @goal. Per GI, pt tolerating diet and plan is to begin weaning TPN. Breakfast tray in room at time of visit, 100% eaten. No c/o N/V. No recent intakes recorded. Pt with dementia, inappropriate for ostomy diet education. No family in room at time of visit. Left ostomy diet handouts at bedside and made pt aware of them. Will continue to monitor for family in room to provide verbal education. Will continue to monitor intakes. Labs- K 3.3, phos 2.3, C 7.3. Meds- Effer-K, TPN (D20, AA5) + lipids. 5/7: F/u. Charting remotely. Pt s/p diverting colostomy 5/5/. Now advanced to CLD this AM. Remains on TPN at goal. Per CRS, pt's ostomy is function with stool in bag. Pt noted to have dementia, will provide new ostomy diet education to pt/family upon f/u or when diet is advanced to solids. Will continue to monitor intakes/diet advancement. Labs- K 3.4, Ca 6.7, phos 2.4. Meds reviewed. 5/4: RD consult received for TPN recommendations. Pt admitted with enteritis. PMH includes HTN, CKD, dementia. BMI 28.9, c/w overweight. PT currently NPO. Per MD note, pt with rectal mass, awaiting bx. CRS planning for diverting colectomy tomorrow morning (5/5). Per CRS, pt has had little to no po intake x2 weeks and anticipate several days before being able to take in any PO. Palliative following, family/pt waiting for pathology before making a final decision on goals of care.  No recent weight hx in chart. TPN recs provided. Phos low, recommend repletion. Will monitor need for TPN adjustments. Labs- phos 1.9, Ca 7.7. Meds- cefepime, flagyl, Zofran, Protonix, [email or web address], TPN. Diet Order: GI lite 
% Eaten:   
Patient Vitals for the past 72 hrs: 
 % Diet Eaten 05/08/20 1224 65 % Pertinent Medications: [x] Reviewed Labs: [x] Reviewed Anthropometrics: Height: 5' 8\" (172.7 cm) Weight: 89.4 kg (197 lb 1.5 oz) IBW (%IBW):   ( ) UBW (%UBW):   (  %) BMI: Body mass index is 29.97 kg/m². This BMI is indicative of: 
 [] Underweight    [] Normal    [x] Overweight    []  Obesity    []  Extreme Obesity (BMI>40) Estimated Nutrition Needs (Based on): 2024 Kcals/day(1557 x 1.3 AF) , 86 g(1-1.2) Protein Carbohydrate: At Least 130 g/day  Fluids: 2024 mL/day (1 ml/kcal) Last BM: 5/10 (ostomy)   [x]Active     []Hyperactive  []Hypoactive       [] Absent   BS Skin:    [] Intact   [] Incision  [] Breakdown   [] DTI   [] Tears/Excoriation/Abrasion  []Edema [] Other: Wt Readings from Last 30 Encounters:  
05/09/20 89.4 kg (197 lb 1.5 oz) NUTRITION DIAGNOSES:  
Problem:  Altered GI function Etiology: related to alteration in GI structure/function Signs/Symptoms: as evidenced by rectal mass/large bowel obstruction per MD, pt NPO, need for TPN    
 
5/7: Nutrition dx continues. 5/11: Nutrition dx continues. NUTRITION INTERVENTIONS: 
Initiate parenteral nutrition, General, healthful diet GOAL:  
TPN + GI lite diet meeting estimated energy/protein needs nexy 2-4 days Cultural, Scientology, or Ethnic Dietary Needs: None EDUCATION & DISCHARGE NEEDS:  
 [x] None Identified 
 [] Identified and Education Provided/Documented 
 [] Identified and Pt declined/was not appropriate 
  
 [] Interdisciplinary Care Plan Reviewed/Documented  
 [x] Discharge Needs: GI lite diet 
 [] No Nutrition Related Discharge Needs NUTRITION RISK:  
Pt Is At Nutrition Risk  [x] No Nutrition Risk Identified  [] PT SEEN FOR:  
 []  MD Consult: []Calorie Count []Diabetic Diet Education []Diet Education []Electrolyte Management []General Nutrition Management and Supplements []Management of Tube Feeding []TPN Recommendations []  RN Referral:  []MST score >=2 
   []Enteral/Parenteral Nutrition PTA []Pregnant: Gestational DM or Multigestation  
              [] Pressure Ulcer 
 
[]  Low BMI      []  Length of Stay       [] Dysphagia Diet         [] Ventilator 
[]  Follow-up Previous Recommendations: 
 [x] Implemented          [] Not Implemented          [] Not Applicable Previous Goal: 
 [] Met              [x] Progressing Towards Goal              [] Not Progressing Towards Goal   [] Not Applicable Kalyan Ramirez RDN Pager [phone] Phone [phone]

## 2020-05-12 NOTE — PROGRESS NOTES
5/12/2020   CARE MANAGEMENT NOTE:  CM reviewed EMR for clinical updates (pt off the floor on 5/5 2/2 surgery).  Pt was admitted with enteritis. Reportedly, pt resides with his dtr, Michelle Vergara ([phone]).   
RUR 20%; GJJ 27 FVMJ 
  
Transition Plan of Care: 1.  [facility] is following pt for progress with PT/OT. If, pt is accepted they require two negative nasopharyngeal swabs 24 hours apart, no sputum tests. 2. SNF - [facility] were [facility] and [facility]. Pt will need to be off of TPN. If, pt is accepted to [address] [place] [address] they request one Covid test and three days of pt being afebrile for placement. 3  Dtr will provide transportation at discharge. 
  
CM will continue to follow pt for definitive discharge plan: [facility] vs SNF rehab. Araceli

## 2020-05-12 NOTE — PROGRESS NOTES
Problem: Mobility Impaired (Adult and Pediatric) Goal: *Acute Goals and Plan of Care (Insert Text) Description: FUNCTIONAL STATUS PRIOR TO ADMISSION: Patient was modified independent for functional mobility and ADLs per patient, still drives. HOME SUPPORT PRIOR TO ADMISSION: The patient lived with family members but did not require assist. 
 
Physical Therapy Goals Initiated 5/1/2020; continue same goals as of 5/8/2020 1. Patient will move from supine to sit and sit to supine , scoot up and down and roll side to side in bed with independence within 7 day(s). 2.  Patient will transfer from bed to chair and chair to bed with independence using the least restrictive device within 7 day(s). 3.  Patient will perform sit <> stand with independence within 7 day(s). 4.  Patient will ambulate with independence for 150 feet with the least restrictive device within 7 day(s). Outcome: Progressing Towards Goal 
 PHYSICAL THERAPY TREATMENT Patient: Sean Slade (74 y.o. male) Date: 5/12/2020 Diagnosis: Enteritis [K52.9] <principal problem not specified> Procedure(s) (LRB): OPEN DIVERTING COLOSTOMY CREATION (N/A) 7 Days Post-Op Precautions: Contact, Fall Chart, physical therapy assessment, plan of care and goals were reviewed. ASSESSMENT Patient continues with skilled PT services and is progressing towards goals. Rolled on the edge of bed min assist, sit to stand min assist, ambulate with rolling walker towards the recliner. No loss of balance steady sitting and standing balance. Performed some active range of motion exercise on both LE all planes. OOB to chair as tolerated. Educate and  Instructed patient to continue active range of motion exercise on both legs while up on chair or on bed. Communicated with nurse who agreed to monitor patient. Current Level of Function Impacting Discharge (mobility/balance): min assist with transfers and ambulation using a rolling walker Other factors to consider for discharge: fall PLAN : 
Patient continues to benefit from skilled intervention to address the above impairments. Continue treatment per established plan of care. to address goals. Recommendation for discharge: (in order for the patient to meet his/her long term goals) Therapy up to 5 days/week in SNF setting This discharge recommendation: 
Has been made in collaboration with the attending provider and/or case management IF patient discharges home will need the following DME: patient owns DME required for discharge SUBJECTIVE:  
Patient stated ok.  OBJECTIVE DATA SUMMARY:  
Critical Behavior: 
Neurologic State: Alert Orientation Level: Disoriented to situation, Oriented to person Cognition: Follows commands Safety/Judgement: Decreased awareness of environment, Decreased awareness of need for safety, Decreased insight into deficits, Fall prevention Functional Mobility Training: 
Bed Mobility: 
Rolling: Minimum assistance Supine to Sit: Minimum assistance Sit to Supine: Minimum assistance Scooting: Minimum assistance Transfers: 
Sit to Stand: Minimum assistance Stand to Sit: Minimum assistance Stand Pivot Transfers: Minimum assistance Bed to Chair: Minimum assistance Balance: 
Sitting: Intact Sitting - Static: Good (unsupported) Sitting - Dynamic: Good (unsupported) Standing: Impaired; With support Standing - Static: Fair Standing - Dynamic : Fair Ambulation/Gait Training: 
Distance (ft): 15 Feet (ft) Assistive Device: Walker, rolling;Gait belt Ambulation - Level of Assistance: Minimal assistance Gait Description (WDL): Exceptions to [facility] Gait Abnormalities: Path deviations; Step to gait Base of Support: Widened Speed/[place]: Slow;Fluctuations Step Length: Right shortened;Left shortened Therapeutic Exercises: Instructed patient to continue active range of motion exercise on both legs while up on chair or on bed. Pain Ratin/10 Activity Tolerance:  
Good Please refer to the flowsheet for vital signs taken during this treatment. After treatment patient left in no apparent distress:  
Sitting in chair, Heels elevated for pressure relief, Call bell within reach, and Bed / chair alarm activated COMMUNICATION/COLLABORATION:  
The patients plan of care was discussed with: Occupational therapy assistant and Registered nurse. Evia Scheuermann, PT,WCC. Time Calculation: 24 mins

## 2020-05-12 NOTE — PROGRESS NOTES
Trey Kincaid [facility] 79 
0032 [facility], [address] [place] [address] 
[phone] Medical Progress Note NAME: Janis Fajardo :  1940 MRM:  [id number] Date/Time of service: 2020  11:41 AM 
 
  
Subjective: Chief Complaint:  Patient was personally seen and examined by me during this time period. Chart reviewed. Feeling well. No N/V 
 
  
Objective:  
 
 
Vitals:  
 
 
Last 24hrs VS reviewed since prior progress note. Most recent are: 
 
Visit Vitals BP (!) 168/99 (BP 1 Location: Left arm, BP Patient Position: At rest;Sitting) Pulse 65 Temp 98.5 °F (36.9 °C) Resp 18 Ht 5' 8\" (1.727 m) Wt 92.7 kg (204 lb 6.4 oz) SpO2 97% BMI 31.08 kg/m² SpO2 Readings from Last 6 Encounters:  
20 97% Intake/Output Summary (Last 24 hours) at 2020 1141 Last data filed at 2020 4653 Gross per 24 hour Intake 460 ml Output 2715 ml Net -2255 ml Exam:  
 
Physical Exam: 
 
Gen:  Elderly, disheveled, NAD HEENT:  Pink conjunctivae, PERRL, hearing intact to voice, moist mucous membranes Neck:  Supple, without masses, thyroid non-tender Resp:  No accessory muscle use, clear breath sounds without wheezes rales or rhonchi 
Card:  No murmurs, normal S1, S2 without thrills, bruits or peripheral edema Abd:  Soft, mild diffused pain, non-distended, normoactive bowel sounds are present, colostomy intact Musc:  No cyanosis or clubbing Skin:  No rashes Neuro:  Cranial nerves 3-12 are grossly intact, follows commands appropriately Psych:  Poor insight, oriented to person Medications Reviewed: (see below) Lab Data Reviewed: (see below) 
 
______________________________________________________________________ Medications:  
 
Current Facility-Administered Medications Medication Dose Route Frequency  potassium chloride SR (KLOR-CON 10) tablet 40 mEq  40 mEq Oral DAILY  TPN ADULT - CENTRAL AA 5% D20% W/ ELECTROLYTES AND CA   IntraVENous CONTINUOUS  
 traMADoL (ULTRAM) tablet 50 mg  50 mg Oral Q6H PRN  
 HYDROmorphone (PF) (DILAUDID) injection 0.5 mg  0.5 mg IntraVENous Q3H PRN  
 glucose chewable tablet 16 g  4 Tab Oral PRN  
 glucagon (GLUCAGEN) injection 1 mg  1 mg IntraMUSCular PRN  
 dextrose 10% infusion 0-250 mL  0-250 mL IntraVENous PRN  
 insulin lispro (HUMALOG) injection   SubCUTAneous Q6H  
 fat emulsion 20% (LIPOSYN, INTRAlipid) infusion 500 mL  500 mL IntraVENous Q MON, WED & FRI  sodium bicarbonate tablet 650 mg  650 mg Oral BID  alteplase (CATHFLO) 1 mg in sterile water (preservative free) 1 mL injection  1 mg InterCATHeter PRN  
 labetaloL (NORMODYNE) tablet 100 mg  100 mg Oral Q12H  
 heparin (porcine) injection 5,000 Units  5,000 Units SubCUTAneous Q8H  
 benzocaine (HURRICANE) 20 % spray   Mucous Membrane PRN  
 sodium chloride (NS) flush 5-40 mL  5-40 mL IntraVENous Q8H  
 sodium chloride (NS) flush 5-40 mL  5-40 mL IntraVENous PRN  
 acetaminophen (TYLENOL) tablet 650 mg  650 mg Oral Q4H PRN  
 ondansetron (ZOFRAN) injection 4 mg  4 mg IntraVENous Q4H PRN  
 labetaloL (NORMODYNE;TRANDATE) 20 mg/4 mL (5 mg/mL) injection 10 mg  10 mg IntraVENous Q4H PRN  pantoprazole (PROTONIX) 40 mg in 0.9% sodium chloride 10 mL injection  40 mg IntraVENous DAILY Lab Review:  
 
Recent Labs 05/11/20 
0304 WBC 10.5 HGB 10.5* HCT 32.1*  
 Recent Labs 05/12/20 
0002 05/11/20 
0304 05/10/20 
0211 05/09/20 
1420  143 143 137  
K 3.4* 3.3* 2.7* 4.1 * 115* 115* 111* CO2 22 22 21 19* GLU 68 112* 106* 728* BUN 19 18 17 17 CREA 1.05 1.01 1.13 1.31* CA 7.5* 7.3* 6.8* 6.6*  
MG 1.8 2.0 1.6  --   
PHOS 2.8 2.3* 2.6 5.4* ALB  --   --   --  1.4* Lab Results Component Value Date/Time  Glucose (POC) 101 (H) 05/12/2020 06:31 AM  
 Glucose (POC) 104 (H) 05/12/2020 12:38 AM  
 Glucose (POC) 101 (H) 05/11/2020 05:33 PM  
 Glucose (POC) 100 05/11/2020 11:30 AM  
 Glucose (POC) 100 05/11/2020 05:28 AM  
 
 
  
Assessment / Plan:  
 
66 yo hx of HTN, CKD 3, dementia, presented w/ N/V, VERONIQUE, found to have rectal CA, now s/p diverting colostomy on 05/05 1) Rectal CA w/ large bowel obstruction: no distant mets on CT. S/p diverting colostomy on 05/05. Rest of management per Oncology and Colorectal surg 2) Severe pro-kaleb malnutrition: due to rectal cancer. Tolerating diet. Cont to weanTPN (Surgery team is managing). Cont to monitor electrolytes closely for re-feeding 3) VERONIQUE/CKD 3: now improved. Due to dehydration. Cont IVF, TPN. Monitor 4) Enteritis/UTI: UCx with alpha strep. 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 Cont lopez 6) HTN: cont labetalol 7) Dementia: no agitation. Poor comprehension of illness. Monitor for delirium 8) HypoK/phos/Mg: cont to monitor and replete daily 9) Debility: cont PT/OT. Awaiting [facility]H vs SNF once off TPN. Will also check COVID (screening) Total time spent with patient: 20 min **I personally saw and examined the patient during this time period** Care Plan discussed with: Patient, nursing, CM Discussed:  Care Plan Prophylaxis:  Hep SQ Disposition:  [facility]/Rehab vs SNF 
        
___________________________________________________ Attending Physician: Alexandrea Genao MD

## 2020-05-12 NOTE — WOUND CARE
Wound/ ostomy follow up for ostomy appliance assessment. Alert, no distress LLQ colostomy appliance intact- large semi liquid drainage, Mid line incision Left NOEMÍ intact. Sacral area intact - small amount mucus drainage from rectal area. Repositioned in bed. Dry dressing to drain site Ostomy Supplies at bedside - tentative dc plans to SNF vs Rehab. Will follow [address]

## 2020-05-12 NOTE — PROGRESS NOTES
Bedside and Verbal shift change report given to Hernando Watson rn  (oncoming nurse) by Carla Pederson  (offgoing nurse). Report included the following information SBAR and Kardex.

## 2020-05-12 NOTE — PROGRESS NOTES
Bedside and Verbal shift change report given to [address] [facility] [address] (oncoming nurse) by Vaughn Johnson RN (offgoing nurse).  Report included the following information SBAR, Kardex, Intake/Output and MAR

## 2020-05-12 NOTE — PROGRESS NOTES
Problem: Self Care Deficits Care Plan (Adult) Goal: *Acute Goals and Plan of Care (Insert Text) Description:  
FUNCTIONAL STATUS PRIOR TO ADMISSION: Pt poor historian; however, reports living with daughter in law, residing on first floor of two story house, with no MARILYN, stating he was Independent with ADLs without use of DME, standing to shower in walk-in shower. Reports he enjoys listening to all music but the \"hillbilly\" kind. HOME SUPPORT: The patient lived with daughter in law who provides PRN ADL/IADL assist. 
 
Occupational Therapy Goals Weekly re-assessment 5/7/2020, patient with decline in function, therefore goals updated below: 1. Patient will perform grooming seated EOB with supervision within 7 day(s). 2.  Patient will perform lower body dressing with supervision using adaptive strategies PRN within 7 day(s). 3.  Patient will perform bathing with minimal assistance within 7 day(s). 4.  Patient will perform toilet transfers with supervision within 7 day(s). 5.  Patient will participate in upper extremity therapeutic exercise/activities with supervision for 5 minutes within 7 day(s). 6.  Patient will utilize energy conservation techniques during functional activities with Min verbal cues within 7 day(s).  
 
Initiated 4/30/2020.   
1.  Patient will perform standing grooming with supervision within 7 day(s)- MET 5/1/20 and upgrade to mod I. 
2.  Patient will perform lower body dressing with supervision within 7 day(s)- MET 5/1/20 and upgrade to mod I. 
3.  Patient will perform bathing with supervision within 7 day(s)- MET 5/1/20 and upgrade to mod I. 
4.  Patient will perform toilet transfers with supervision within 7 day(s)- MET 5/1/20 and upgrade to mod I. 
5.  Patient will perform all aspects of toileting with supervision within 7 day(s)- MET 5/1/20 and upgrade to mod I. 
6.  Patient will participate in upper extremity therapeutic exercise/activities with supervision for 10 minutes within 7 day(s). 7.  Patient will utilize energy conservation techniques during functional activities with Min verbal cues within 7 day(s). Outcome: Progressing Towards Goal 
 OCCUPATIONAL THERAPY TREATMENT Patient: Gayle Bright (39 y.o. male) Date: 5/12/2020 Diagnosis: Enteritis [K52.9] <principal problem not specified> Procedure(s) (LRB): OPEN DIVERTING COLOSTOMY CREATION (N/A) 7 Days Post-Op Precautions: Contact, Fall Chart, occupational therapy assessment, plan of care, and goals were reviewed. ASSESSMENT Patient continues with skilled OT services and is progressing towards goals. Pt out of bed to chair assist x 1, verbalized stomach \"churning\" feeling like he needs the bathroom however has colostomy, lopez catheter. Once in the chair pt did not want to engage with further activity stating coffee grounds on his bedside table making him feel sick. Encouraged pt to perform UE exercises later as able to maintain strength/ endurance for functional tasks. Current Level of Function Impacting Discharge (ADLs): min assist x 1 bed to chair, setup for grooming, bathing Other factors to consider for discharge: PLAN : 
Patient continues to benefit from skilled intervention to address the above impairments. Continue treatment per established plan of care. to address goals. Recommend with staff: Out of bed for meals, activity Recommend next OT session: Cont towards goals Recommendation for discharge: (in order for the patient to meet his/her long term goals) Therapy up to 5 days/week in SNF setting or an intensive home health therapy program 
 
This discharge recommendation: 
Has not yet been discussed the attending provider and/or case management IF patient discharges home will need the following DME:   
 
 
SUBJECTIVE:  
Patient stated All I know is one minute I as upright and the next I am in here.  OBJECTIVE DATA SUMMARY:  
Cognitive/Behavioral Status: 
Neurologic State: Alert Orientation Level: Disoriented to situation;Oriented to person Cognition: Follows commands Functional Mobility and Transfers for ADLs: 
Bed Mobility: 
Rolling: Minimum assistance Supine to Sit: Minimum assistance Sit to Supine: Minimum assistance Scooting: Minimum assistance Transfers: 
Sit to Stand: Minimum assistance Bed to Chair: Minimum assistance Balance: 
Sitting: Intact Sitting - Static: Good (unsupported) Sitting - Dynamic: Good (unsupported) Standing: Impaired; With support Standing - Static: Fair Standing - Dynamic : Fair ADL Intervention: 
  
Set-up bathing and grooming in the chair, min assist x 1 functional transfers Activity Tolerance:  
Fair Please refer to the flowsheet for vital signs taken during this treatment. After treatment patient left in no apparent distress:  
Sitting in chair COMMUNICATION/COLLABORATION:  
The patients plan of care was discussed with: Physical therapist and Occupational therapist.  
 
KRANTHI Medley Time Calculation: 15 mins

## 2020-05-12 NOTE — PROGRESS NOTES
visited Pt, . Steven Garciatenisha at the Med Surg unit for palliative follow up visit. Physical Therapy team were getting ready to work with pt.  stopped by briefly and greeted Steven Holloway, who made eye contact and stated he recognizes  from previous visit. He  was advised of  spiritual care availability, to call as needed. He thanked  for the visit. Spiritual care is still available as needed or upon request.  
Visited by: Duc Newton. Rodney ding: [phone]  ([phone])

## 2020-05-13 PROBLEM — C20 RECTAL CANCER (HCC): Status: ACTIVE | Noted: 2020-01-01

## 2020-05-13 NOTE — DISCHARGE INSTRUCTIONS
HOSPITALIST DISCHARGE INSTRUCTIONS  NAME: Marleny Dominique   :  1940   MRN:  [id number]     Date/Time:  2020 11:10 AM    ADMIT DATE: 2020     DISCHARGE DATE: 2020     ADMITTING DIAGNOSIS:  Rectal cancer s/p diverting colostomy, malnutrition (was on TPN)    DISCHARGE DIAGNOSIS:  same    MEDICATIONS:  See after visit summary       · It is important that you take the medication exactly as they are prescribed. · Keep your medication in the bottles provided by the pharmacist and keep a list of the medication names, dosages, and times to be taken in your wallet. · Do not take other medications without consulting your doctor     Pain Management: per above medications    What to do at Home    Recommended diet:  Regular Diet    Recommended activity: Activity as tolerated    1) Return to the hospital if you feel worse    2) If you experience any of the following symptoms then please call your primary care physician or return to the emergency room if you cannot get hold of your doctor:  Fever, chills, nausea, vomiting, diarrhea, change in mentation, falling, bleeding, shortness of breath, chest pain, severe headache, severe abdominal pain,     3) Continue rehab as directed    4) Follow up with your doctors    Follow Up:   Follow-up Information     Follow up With Specialties Details Why Contact Info    Chetan Peters MD Cardiology, Internal Medicine Schedule an appointment as soon as possible for a visit in 2 weeks virtual visit 220 pm to discuss possible stress test  [address] Leilani Anderson MD Internal Medicine Schedule an appointment as soon as possible for a visit in 2 weeks  [address] Venus Castano MD Colon and Rectal Surgery Schedule an appointment as soon as possible for a visit in 1 week  [address] [facility] 69      Junie Necessary MD [facility] Hematology and Oncology, Internal Medicine, Hematology, Oncology Schedule an appointment as soon as possible for a visit in 2 weeks  [address] [facility]  [place] [address] [facility]  [phone]              Information obtained by :  I understand that if any problems occur once I am at home I am to contact my physician. I understand and acknowledge receipt of the instructions indicated above. Physician's or R.N.'s Signature                                                                  Date/Time                                                                                                                                              Patient or Representative Signature                                                          Date/Time    Fara Kohli MD, FACS  Bossman Hernandez. Rajat Vargas MD, Jo Cobb MD, MD Caroline Zamora MD    [facility] Discharge Instructions for Chris Sanchez    1. Diagnosis: Diverting end colostomy (Left UPPER abdomen), mucous fistula (Left LOWER abdomen)    2. Restricted fiber diet. 3. Sebastian Kwong has 2 ostomies:  LEFT UPPER COLOSTOMY- will need to be covered with appliance and bag emptied as needed                                              LEFT LOWER mucous fistula- will only put out mucus infrequently. Cover with band-aid and change as needed    4. PINEDA CATHETER STAYS FOR 1 MONTH. IT IS TO BE REMOVED ONLY BY UROLOGY. PLEASE CALL DR. EDMONDS/ [facility] FOR AN APPOINTMENT    5. May walk as desired. May go up and down stairs. 6. Take pain medication as prescribed:   Ultram 50mg EVERY 6 HOURS AS NEEDED FOR PAIN. Other Medications: please see full list for additional meds  Ostomy Supplies    7. See me in the office in 10-14 days.  Call as soon as discharged for an appointment [phone] . IF SURGERY INVOLVED AN OSTOMY BAG, PLEASE BRING YOUR SUPPLIES TO YOUR 1ST VISIT! 8. Call the Exchange [phone], if you have any questions or problems after office hours.

## 2020-05-13 NOTE — PROGRESS NOTES
CMS Note 5/13/2020 Patient received their 2nd IMM letter, patient was not able to sign for CMS. Patient was given a copy for their record. Sola Mariscal CMS

## 2020-05-13 NOTE — PALLIATIVE CARE DISCHARGE
Goals of Care/Treatment Preferences The Palliative Medicine team was consulted as part of your/your loved one's care in the hospital. Our team is a supportive service; we strive to relieve suffering and improve quality of life. We reviewed advance care planning information, which includes the following: 
Patient's [place] is[de-identified] Verbal statement (Legal Next of Kin remains as decision maker) Confirm Advance Directive: Yes, not on file Patient Would Like to Complete Advance Directive: Yes Patient/Health Care Proxy Stated Goals: Pursue rehab for strength building; hospice has been discussed as an option for the future We reviewed / discussed your code status as: DNR 
   Full Code means perform CPR in the event of cardiac arrest. 
    DNR means do NOT perform CPR in the event of cardiac arrest. 
    Partial Code means you have specific preferences, please discuss with your healthcare team. 
    Kathe Mis means this issue was not addressed / resolved during your stay Medical Interventions: Full interventions Other Instructions: A Durable Do Not Resuscitate form is being sent out with you but will not be valid until signed by your health care decision maker. Once complete, this form should travel with you. When you are in a facility, this form should be placed on your chart. Once you are home, it is recommended that the [facility] form be placed in a visible location such as on the refrigerator or bedroom door. Because of the importance of this information, we are providing you with a printed copy to share with other healthcare providers after this hospitalization is complete.

## 2020-05-13 NOTE — PROGRESS NOTES
Trey Bealelsen [facility] 79 
[address] 
[phone] Medical Progress Note NAME: Sarai Parker :  1940 MRM:  [id number] Date/Time of service: 2020  10:37 AM 
 
  
Subjective: Chief Complaint:  Patient was personally seen and examined by me during this time period. Chart reviewed. Off TPN, tolerating diet Objective:  
 
 
Vitals:  
 
 
Last 24hrs VS reviewed since prior progress note. Most recent are: 
 
Visit Vitals /89 (BP 1 Location: Left arm, BP Patient Position: At rest) Pulse 65 Temp 98.2 °F (36.8 °C) Resp 20 Ht 5' 8\" (1.727 m) Wt 83.5 kg (184 lb) SpO2 98% BMI 27.98 kg/m² SpO2 Readings from Last 6 Encounters:  
20 98% Intake/Output Summary (Last 24 hours) at 2020 1037 Last data filed at 2020 4528 Gross per 24 hour Intake 118 ml Output 2345 ml Net -2227 ml Exam:  
 
Physical Exam: 
 
Gen:  Elderly, disheveled, NAD HEENT:  Pink conjunctivae, PERRL, hearing intact to voice, moist mucous membranes Neck:  Supple, without masses, thyroid non-tender Resp:  No accessory muscle use, clear breath sounds without wheezes rales or rhonchi 
Card:  No murmurs, normal S1, S2 without thrills, bruits or peripheral edema Abd:  Soft, mild diffused pain, non-distended, normoactive bowel sounds are present, colostomy intact Musc:  No cyanosis or clubbing, right arm PICC Skin:  No rashes Neuro:  Cranial nerves 3-12 are grossly intact, follows commands appropriately Psych:  Poor insight, oriented to person Medications Reviewed: (see below) Lab Data Reviewed: (see below) 
 
______________________________________________________________________ Medications:  
 
Current Facility-Administered Medications Medication Dose Route Frequency  hydrALAZINE (APRESOLINE) 20 mg/mL injection 20 mg  20 mg IntraVENous Q6H PRN  
  potassium chloride SR (KLOR-CON 10) tablet 40 mEq  40 mEq Oral DAILY  traMADoL (ULTRAM) tablet 50 mg  50 mg Oral Q6H PRN  
 HYDROmorphone (PF) (DILAUDID) injection 0.5 mg  0.5 mg IntraVENous Q3H PRN  
 glucose chewable tablet 16 g  4 Tab Oral PRN  
 glucagon (GLUCAGEN) injection 1 mg  1 mg IntraMUSCular PRN  
 dextrose 10% infusion 0-250 mL  0-250 mL IntraVENous PRN  
 insulin lispro (HUMALOG) injection   SubCUTAneous Q6H  
 sodium bicarbonate tablet 650 mg  650 mg Oral BID  alteplase (CATHFLO) 1 mg in sterile water (preservative free) 1 mL injection  1 mg InterCATHeter PRN  
 labetaloL (NORMODYNE) tablet 100 mg  100 mg Oral Q12H  
 heparin (porcine) injection 5,000 Units  5,000 Units SubCUTAneous Q8H  
 benzocaine (HURRICANE) 20 % spray   Mucous Membrane PRN  
 sodium chloride (NS) flush 5-40 mL  5-40 mL IntraVENous Q8H  
 sodium chloride (NS) flush 5-40 mL  5-40 mL IntraVENous PRN  
 acetaminophen (TYLENOL) tablet 650 mg  650 mg Oral Q4H PRN  
 ondansetron (ZOFRAN) injection 4 mg  4 mg IntraVENous Q4H PRN  pantoprazole (PROTONIX) 40 mg in 0.9% sodium chloride 10 mL injection  40 mg IntraVENous DAILY Lab Review:  
 
Recent Labs 05/11/20 
0304 WBC 10.5 HGB 10.5* HCT 32.1*  
 Recent Labs 05/13/20 
0145 05/12/20 
0002 05/11/20 
0304 * 143 143  
K 3.5 3.4* 3.3*  
* 114* 115* CO2 21 22 22 GLU 88 68 112* BUN 16 19 18 CREA 1.01 1.05 1.01  
CA 7.9* 7.5* 7.3*  
MG 1.9 1.8 2.0 PHOS 3.4 2.8 2.3* Lab Results Component Value Date/Time Glucose (POC) 115 (H) 05/13/2020 05:52 AM  
 Glucose (POC) 80 05/12/2020 11:51 PM  
 Glucose (POC) 104 (H) 05/12/2020 05:52 PM  
 Glucose (POC) 100 05/12/2020 12:17 PM  
 Glucose (POC) 101 (H) 05/12/2020 06:31 AM  
 
 
  
Assessment / Plan:  
 
68 yo hx of HTN, CKD 3, dementia, presented w/ N/V, VERONIQUE, found to have rectal CA, now s/p diverting colostomy on 05/05 1) Rectal CA w/ large bowel obstruction: no distant mets on CT. S/p diverting colostomy on 05/05. Rest of management per Oncology and Colorectal surg 2) Severe pro-kaleb malnutrition: due to rectal cancer. Tolerating diet. Off TPN on 05/12. Cont to monitor electrolytes closely for re-feeding 3) VERONIQUE/CKD 3: now improved. Due to dehydration. Cont IVF, TPN. Monitor 4) Enteritis/UTI: UCx with alpha strep. 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 Cont lopez, will need voiding trial during rehab 6) HTN: cont labetalol 7) Dementia: no agitation. Poor comprehension of illness. Monitor for delirium 8) HypoK/phos/Mg: cont to monitor and replete daily 9) Debility: cont PT/OT. Awaiting [facility]. COVID test was negative Total time spent with patient: 20 min **I personally saw and examined the patient during this time period** Care Plan discussed with: Patient, nursing, CM Discussed:  Care Plan Prophylaxis:  Hep SQ Disposition:  SAH/Rehab 
        
___________________________________________________ Attending Physician: Regi Herr MD

## 2020-05-13 NOTE — DISCHARGE SUMMARY
Trey Kincaid [facility] 79 
5260 [facility], [address] [place] [address] 
[phone] Physician Discharge Summary Patient ID: 
Milvia Miller [id number] 
86 y.o. 
1940 Admit date: 4/29/2020 Discharge date and time: 5/13/2020 11:11 AM 
 
Admission Diagnoses: Enteritis [K52.9] Discharge Diagnoses:  Principal Diagnosis Rectal cancer ([address] Principal Problem: 
  Rectal cancer ([address] (5/13/2020) Active Problems: 
  Enteritis (4/30/2020) Renal insufficiency (4/30/2020) Dementia ([address] (4/30/2020) Elevated troponin (4/30/2020) Leukocytosis (4/30/2020) HTN (hypertension) (4/30/2020) Hospital Course:  
 
66 yo hx of HTN, CKD 3, dementia, presented w/ N/V, VERONIQUE, found to have rectal CA, now s/p diverting colostomy on 05/05 
  
1) Rectal CA w/ large bowel obstruction: no distant mets on CT. S/p diverting colostomy on 05/05. Rest of management per Oncology and Colorectal surg 
  
2) Severe pro-kaelb malnutrition: due to rectal cancer. Tolerating diet. Off TPN on 05/12. Will remove PICC prior to discharge   
3) VERONIQUE/CKD 3: now improved. Due to dehydration. Cont bicarb. Renal was following 
  
4) Enteritis/UTI: UCx with alpha strep. S/p 7 days of IV cefepime/flagyl   
5) Bladder outlet obstruction: due to cancer. Cont lopez, will need f/u with Urology for voiding trial during rehab 
  
6) HTN: cont labetalol   
7) Dementia: no agitation. Poor comprehension of illness. Monitor for delirium   
8) HypoK/phos/Mg: repleted   
9) Debility: cont PT/OT. Awaiting [facility]. COVID test was negative PCP: Yamile Bashir MD  
 
Consults: Colorectal surg, GI, Oncology, Urology, Cards Significant Diagnostic Studies: diverting colostomy Discharge Exam: 
Physical Exam: 
 
See daily note Disposition: [facility] Discharge Condition: Stable Patient Instructions:  
Current Discharge Medication List  
  
 START taking these medications Details  
acetaminophen (TYLENOL) 325 mg tablet Take 2 Tabs by mouth every six (6) hours as needed for Pain or Fever. Qty: 30 Tab, Refills: 0  
  
labetaloL (NORMODYNE) 100 mg tablet Take 1 Tab by mouth every twelve (12) hours. Qty: 60 Tab, Refills: 0  
  
potassium chloride SR (K-TAB) 20 mEq tablet Take 2 Tabs by mouth daily. Qty: 30 Tab, Refills: 0  
  
sodium bicarbonate 650 mg tablet Take 1 Tab by mouth two (2) times a day. Qty: 60 Tab, Refills: 0  
  
traMADoL (ULTRAM) 50 mg tablet Take 1 Tab by mouth every six (6) hours as needed for Pain for up to 5 days. Max Daily Amount: 200 mg. Qty: 20 Tab, Refills: 0 Associated Diagnoses: Enteritis  
  
ondansetron (Zofran ODT) 4 mg disintegrating tablet Take 1 Tab by mouth every eight (8) hours as needed for Nausea or Vomiting. Qty: 20 Tab, Refills: 0  
  
pantoprazole (Protonix) 40 mg tablet Take 1 Tab by mouth daily. Qty: 30 Tab, Refills: 0 Activity: Activity as tolerated Diet: Regular Diet Wound Care: As directed Follow-up with Follow-up Information Follow up With Specialties Details Why Contact Info Candance Magnus, MD Cardiology, Internal Medicine Schedule an appointment as soon as possible for a visit in 2 weeks virtual visit 220 pm to discuss possible stress test  [address] [facility] 600 1007 [facility] 
[phone] Monika Welsh MD Internal Medicine Schedule an appointment as soon as possible for a visit in 2 weeks  65 [facility] [address] [phone] Samson Hernández MD Colon and Rectal Surgery Schedule an appointment as soon as possible for a visit in 1 week  [address] [facility] [address] 
[phone] Raheel Scherer MD Hematology and Oncology, Internal Medicine, Hematology, Oncology Schedule an appointment as soon as possible for a visit in 2 weeks  [address] [place] [address] [address] [place] [address] 99 [phone] Follow-up tests/labs none Signed: 
Ariadna Huggins MD 
5/13/2020 
11:11 AM 
**I personally spent 40 min on discharge**

## 2020-05-13 NOTE — WOUND CARE
Ostomy nurse follow up Continues to have large liquid mucus like output- leaking after activity. Ostomy appliance changed- one piece HI OUTPUT appliance replaced Colostomy red and moist- functioning, mucus fistula continues to drain mucus, yuliana wound skin intact Yuliana rectal area intact with small amount mucus from rectum. Staff aware of care given, supplies at bedside. Will follow DC plan to  [facility]

## 2020-05-13 NOTE — PROGRESS NOTES
Problem: Mobility Impaired (Adult and Pediatric) Goal: *Acute Goals and Plan of Care (Insert Text) Description: FUNCTIONAL STATUS PRIOR TO ADMISSION: Patient was modified independent for functional mobility and ADLs per patient, still drives. HOME SUPPORT PRIOR TO ADMISSION: The patient lived with family members but did not require assist. 
 
Physical Therapy Goals Initiated 5/1/2020; continue same goals as of 5/8/2020 1. Patient will move from supine to sit and sit to supine , scoot up and down and roll side to side in bed with independence within 7 day(s). 2.  Patient will transfer from bed to chair and chair to bed with independence using the least restrictive device within 7 day(s). 3.  Patient will perform sit <> stand with independence within 7 day(s). 4.  Patient will ambulate with independence for 150 feet with the least restrictive device within 7 day(s). Outcome: Progressing Towards Goal 
 PHYSICAL THERAPY TREATMENT Patient: Max Marie (12 y.o. male) Date: 5/13/2020 Diagnosis: Enteritis [K52.9] <principal problem not specified> Procedure(s) (LRB): OPEN DIVERTING COLOSTOMY CREATION (N/A) 8 Days Post-Op Precautions: Contact, Fall Chart, physical therapy assessment, plan of care and goals were reviewed. ASSESSMENT Patient continues with skilled PT services and is progressing towards goals. Rolled on the edge of bed min assist, sit to stand min assist, ambulate with rolling walker towards the recliner min assist. OOB to chair as tolerated performed some active range of motion exercise on both LE all planes. Noted NOEMÍ drain disconnected reattached and notified nurse who agreed to check it. Educate and  Instructed patient to continue active range of motion exercise on both legs while up on chair or on bed. Nurse monitoring patient. Current Level of Function Impacting Discharge (mobility/balance): min assist with transfers and ambulation using a rolling walker. Other factors to consider for discharge: PLAN : 
Patient continues to benefit from skilled intervention to address the above impairments. Continue treatment per established plan of care. to address goals. Recommendation for discharge: (in order for the patient to meet his/her long term goals) Therapy 3 hours per day 5-7 days per week This discharge recommendation: 
Has been made in collaboration with the attending provider and/or case management IF patient discharges home will need the following DME: patient owns DME required for discharge SUBJECTIVE:  
Patient stated ok.  OBJECTIVE DATA SUMMARY:  
Critical Behavior: 
Neurologic State: Alert Orientation Level: Oriented X4 Cognition: Appropriate decision making, Appropriate for age attention/concentration, Appropriate safety awareness Safety/Judgement: Decreased awareness of environment, Decreased awareness of need for safety, Decreased insight into deficits, Fall prevention Functional Mobility Training: 
Bed Mobility: 
Rolling: Minimum assistance Supine to Sit: Minimum assistance Sit to Supine: Minimum assistance Scooting: Minimum assistance Transfers: 
Sit to Stand: Minimum assistance Stand to Sit: Minimum assistance Stand Pivot Transfers: Minimum assistance Bed to Chair: Minimum assistance Balance: 
Sitting: Intact Sitting - Static: Good (unsupported) Sitting - Dynamic: Good (unsupported) Standing: Impaired; With support Standing - Static: Fair Standing - Dynamic : Fair Ambulation/Gait Training: 
Distance (ft): 10 Feet (ft) Assistive Device: Walker, rolling;Gait belt Ambulation - Level of Assistance: Minimal assistance Gait Description (WDL): Exceptions to [facility] Gait Abnormalities: Path deviations; Step to gait Base of Support: Widened Speed/Herminia: Fluctuations Step Length: Right shortened;Left shortened Therapeutic Exercises: Instructed patient to continue active range of motion exercise on both legs while up on chair or on bed. Pain Ratin/10 Activity Tolerance:  
Good Please refer to the flowsheet for vital signs taken during this treatment. After treatment patient left in no apparent distress:  
Supine in bed, Heels elevated for pressure relief, Call bell within reach and Bed / chair alarm activated COMMUNICATION/COLLABORATION:  
The patients plan of care was discussed with: Registered nurse. Angeles Hutchinson, PT,WCC. Time Calculation: 24 mins

## 2020-05-13 NOTE — PROGRESS NOTES
Discussed during IDR's the concern of length of days PICC in. Patient has been weaned off TPN and the plan for DC is SNF vs. Rehab. Will keep PICC in per Dr. Martin Cisneros until day of discharge. Will relay to primary nurse.

## 2020-05-13 NOTE — PROGRESS NOTES
Problem: Self Care Deficits Care Plan (Adult) Goal: *Acute Goals and Plan of Care (Insert Text) Description:  
FUNCTIONAL STATUS PRIOR TO ADMISSION: Pt poor historian; however, reports living with daughter in law, residing on first floor of two story house, with no MARILYN, stating he was Independent with ADLs without use of DME, standing to shower in walk-in shower. Reports he enjoys listening to all music but the \"hillbilly\" kind. HOME SUPPORT: The patient lived with daughter in law who provides PRN ADL/IADL assist. 
 
Occupational Therapy Goals Weekly re-assessment 5/7/2020, patient with decline in function, therefore goals updated below: 1. Patient will perform grooming seated EOB with supervision within 7 day(s). 2.  Patient will perform lower body dressing with supervision using adaptive strategies PRN within 7 day(s). 3.  Patient will perform bathing with minimal assistance within 7 day(s). 4.  Patient will perform toilet transfers with supervision within 7 day(s). 5.  Patient will participate in upper extremity therapeutic exercise/activities with supervision for 5 minutes within 7 day(s). 6.  Patient will utilize energy conservation techniques during functional activities with Min verbal cues within 7 day(s).  
 
Initiated 4/30/2020.   
1.  Patient will perform standing grooming with supervision within 7 day(s)- MET 5/1/20 and upgrade to mod I. 
2.  Patient will perform lower body dressing with supervision within 7 day(s)- MET 5/1/20 and upgrade to mod I. 
3.  Patient will perform bathing with supervision within 7 day(s)- MET 5/1/20 and upgrade to mod I. 
4.  Patient will perform toilet transfers with supervision within 7 day(s)- MET 5/1/20 and upgrade to mod I. 
5.  Patient will perform all aspects of toileting with supervision within 7 day(s)- MET 5/1/20 and upgrade to mod I. 
6.  Patient will participate in upper extremity therapeutic exercise/activities with supervision for 10 minutes within 7 day(s). 7.  Patient will utilize energy conservation techniques during functional activities with Min verbal cues within 7 day(s). Outcome: Progressing Towards Goal 
 OCCUPATIONAL THERAPY TREATMENT Patient: Darlene Gentile (42 y.o. male) Date: 5/13/2020 Diagnosis: Enteritis [K52.9] Rectal cancer ([facility] Procedure(s) (LRB): OPEN DIVERTING COLOSTOMY CREATION (N/A) 8 Days Post-Op Precautions: Contact, Fall Chart, occupational therapy assessment, plan of care, and goals were reviewed. ASSESSMENT Patient continues with skilled OT services and is progressing towards goals. Mr. Mila Suresh was received in the chair agreeable to activity. Patient engaged in seated ADL tasks with only setup required. With increased time in sitting, patient had c/o back pain and agreeable to reposition for comfort. Patient able to stand with min A x1. On initial stance, patient had stool leak onto the floor in copious amounts from colostomy. He required return to sitting for hygiene care. Patient was able to stand x2 additional times to assist with linen change, hygiene, and clothing management (change gown and socks). RN called to the room when occurred and managing when tx ended. Patient is expected to transfer to rehab today. Current Level of Function Impacting Discharge (ADLs): Patient required min A for functional mobility. He requires setup for grooming and total A for hygiene and colostomy care. PLAN : 
Patient continues to benefit from skilled intervention to address the above impairments. Continue treatment per established plan of care. to address goals. Recommendation for discharge: (in order for the patient to meet his/her long term goals) Therapy 3 hours per day 5-7 days per week This discharge recommendation: 
Has been made in collaboration with the attending provider and/or case management IF patient discharges home will need the following DME: none SUBJECTIVE:  
Patient stated I'm gonna be sick now.  OBJECTIVE DATA SUMMARY:  
Cognitive/Behavioral Status: 
Neurologic State: Alert Orientation Level: Oriented X4 Cognition: Follows commands Perception: Appears intact Perseveration: No perseveration noted Safety/Judgement: Awareness of environment Functional Mobility and Transfers for ADLs: 
Bed Mobility: 
Rolling: Minimum assistance Supine to Sit: Minimum assistance Sit to Supine: Minimum assistance Scooting: Minimum assistance Transfers: 
Sit to Stand: Minimum assistance Bed to Chair: Minimum assistance Balance: 
Sitting: Intact Sitting - Static: Good (unsupported) Sitting - Dynamic: Good (unsupported) Standing: Impaired; With support Standing - Static: Fair Standing - Dynamic : Fair ADL Intervention: 
Grooming Grooming Assistance: Set-up Position Performed: Seated in chair Washing Face: Set-up Washing Hands: Set-up Brushing Teeth: Set-up Toileting Toileting Assistance: Total assistance(dependent)(colostomy care) Cognitive Retraining Safety/Judgement: Awareness of environment Activity Tolerance:  
Fair Please refer to the flowsheet for vital signs taken during this treatment. After treatment patient left in no apparent distress:  
Sitting in chair COMMUNICATION/COLLABORATION:  
The patients plan of care was discussed with: Physical therapist, Registered nurse, and patient . ELENI Hou/L Time Calculation: 44 mins

## 2020-05-13 NOTE — ACP (ADVANCE CARE PLANNING)
Advance Care Planning Advance Care Planning Activator (Inpatient) Conversation Note Date of ACP Conversation: 4/29/2020 Conversation Conducted with: [place], step-dtr, reportedly has been appointed as Medical POA but copy of AMD has not yet been provided ACP Activator: Latoya Price, DAREN  *SW did not speak with pt/family; documentation based on Palliative Medicine Physician Dr. Dale Estrada conversations with family. Health Care Decision Maker: 
 
Current Designated Health Care Decision Maker:   Primary Decision Maker: Teresa Hopper (step-dtr) - Daughter - [phone] Care Preferences Ventilation: \"If you were in your present state of health and suddenly became very ill and were unable to breathe on your own, what would your preference be about the use of a ventilator (breathing machine) if it were available to you? \" If patient would desire the use of a ventilator (breathing machine), answer \"yes\", if not \"no\":no \"If your health worsens and it becomes clear that your chance of recovery is unlikely, what would your preference be about the use of a ventilator (breathing machine) if it were available to you? \" Would the patient desire the use of a ventilator (breathing machine)? NO Resuscitation \"CPR works best to restart the heart when there is a sudden event, like a heart attack, in someone who is otherwise healthy. Unfortunately, CPR does not typically restart the heart for people who have serious health conditions or who are very sick. \" \"In the event your heart stopped as a result of an underlying serious health condition, would you want attempts to be made to restart your heart (answer \"yes\" for attempt to resuscitate) or would you prefer a natural death (answer \"no\" for do not attempt to resuscitate)? \" no 
 
[x] Yes  [] No   Educated Patient / Sreekanth Kruger regarding differences between Advance Directives and portable DNR orders. Conversation Outcomes: 
[x] ACP discussion completed 
[] Copy of existing advance directive requested 
 [] New Advance Directive completed 
 [x] Portable Do Not Rescitate prepared for Provider review and signature:  Pt unable to sign; DDNR form was signed by physician and will be sent out with pt at time of discharge with instruction for family to sign 
[] POLST/POST/MOLST/MOST prepared for Provider review and signature Follow-up plan:   
[] Schedule follow-up conversation to continue planning 
[] Referred individual to Provider for additional questions/concerns  
[] Advised patient/agent/surrogate to review completed ACP document and update if needed with changes in condition, patient preferences or care setting  
 
[x] This note routed to one or more involved healthcare providers

## 2020-05-13 NOTE — PROGRESS NOTES
Bedside and Verbal shift change report given to Stanley (oncoming nurse) by Melodie Mena RN (offgoing nurse). Report included the following information SBAR, Kardex, Intake/Output and MAR.

## 2020-05-13 NOTE — ROUTINE PROCESS
TRANSFER - OUT REPORT: 
 
Verbal report given to Stefano Quinonez RN(name) on Lucy Massey  being transferred to [facility]) for routine progression of care Report consisted of patients Situation, Background, Assessment and  
Recommendations(SBAR). Information from the following report(s) SBAR and Kardex was reviewed with the receiving nurse. Lines:    
 
Opportunity for questions and clarification was provided. Patient transported with: 
 Registered Nurse Tech

## 2020-05-13 NOTE — PROGRESS NOTES
Unable to locate patients top dentures. Patient unsure if they were left at home. Attempted to call patients daughter but no answer and mailbox full.

## 2020-05-13 NOTE — PROGRESS NOTES
CRS Note Patient doing well with no new issues. Starting to take PO - weaned off TPN. Colostomy functioning. Recommend removing the NOEMÍ drain prior to discharge to rehab/SNF. Ok to be d/c from our standpoint. Needs to f/u with Dr. Adry Platt in the office in 2 weeks. Please call with any further questions. Wily Bustillo MD 
[phone] (cell)

## 2020-05-13 NOTE — PROGRESS NOTES
5/13/2020   CARE MANAGEMENT NOTE:  CM reviewed EMR for clinical updates.  Pt was admitted with enteritis. Reportedly, pt resides with his dtr, Warrendale Calin ([phone]).   
RUR 20%; YVJ 38 CPDG 
  
Transition Plan of Care: 1.  [facility] - accepted and a bed is available there today. RN, please call report to [facility] at [phone] and a room will be assigned. They would like pt after 3 p.m. 2  COVID 19 test for placement purposes is negative. Since pt is asymptomatic [facility] will admit with just one negative nasopharyngeal test (this is a change and pt specific per my conversation with their liaison on 5/12). CM notified pt's dtr of discharge to [facility] and she is agreeable. No further needs identified at this writing. Araceli

## 2020-05-18 NOTE — CDMP QUERY
Good Morning, Patient admitted with N/V/D with new Colon Cancer. It's noted documentation of Severe pro-kaleb malnutrition within previous progress notes & the discharge summary on 5/13/20. Please provide clinical indicators/treatment to support this diagnosis within a progress note and/or addendum to the discharge summary: 
 
==>Severe Protein-Calorie Malnutrition Ruled Out 
==>Severe Protein-Calorie Malnutrition Ruled In 
==>Other; Unspecified 
==>Unable to determine The medical record reflects the following:   
    
Risk Factors: 67 [address] M admitted with N/V/D with new found Rectal Mass/Colon Cancer Clinical Indicators: Patient arrived to the ED with c/o N/V/D with decreased PO intake, fatigue, weakness, weight loss and abdominal distention. CT imaging revealed extensive small and large bowel distention. Fluid-filled loops of small and large bowel. The absence of IV contrast limits evaluation, consider severe enteritis. Distal circumferential rectal wall thickening may be related to infectious process, neoplastic etiology is not excluded. Colon rectal surgery stated neoplasia with complete obstruction of the rectum. Surgery was planned for a colostomy/decompression with hospice goal in mind. Patient was provided PICC and TPN was started. Nutrition consult was placed. Registered dietician saw the patient on 5/4, 5/7 and 5/11. Patient was only noted per the registered dietician's notes as \"nurtirition risk\". No documentation of patient meeting ASPEN criteria for malnutrition per progress notes. Treatment: BMP daily, nutrition consult/gudience, TPN management, GI Lite diet and PICC line. Thank you, Josue Ramsay [facility] ASPEN Guidelines needed to be documented for Malnutrition to not be denied by external reviewers:

## 2020-06-03 NOTE — ED NOTES
Colmenares catheter changed and let bad in place. Colmenares draining appropriately and pt is comfortable.

## 2020-06-03 NOTE — ED PROVIDER NOTES
30-year-old male presents the emergency room for evaluation of problem with his Colmenares catheter. Patient has a history of stage IV colon cancer. Patient with chronic Colmenares as well as ostomy. Patient reports overnight his Colmenares catheter became dislodged from the tubing in the bag. Patient's only complaint is embarrassment due to being in the ER. He has no abdominal pain dysuria pain with Colmenares drainage back pain fever or chills. No known precipitating events. No aggravating or alleviating factors. Patient has no other complaint at this time. Social hx Nonsmoker Lives with family The history is provided by the patient. No  was used. Urinary Catheter Problem Pertinent negatives include no chills, no nausea, no vomiting, no frequency, no hematuria, no abdominal pain and no back pain. His past medical history is significant for urinary catheter problem. Past Medical History:  
Diagnosis Date  History of vascular access device 05/04/2020 [facility] VAT 5Fr Triple PICC Right brachial 43cm for TPN Past Surgical History:  
Procedure Laterality Date 2021 Dee Becerra N/A 5/1/2020 SIGMOIDOSCOPY FLEXIBLE performed by Mehreen Tay MD at [address] No family history on file. Social History Socioeconomic History  Marital status: SINGLE Spouse name: Not on file  Number of children: Not on file  Years of education: Not on file  Highest education level: Not on file Occupational History  Not on file Social Needs  Financial resource strain: Not on file  Food insecurity Worry: Not on file Inability: Not on file  Transportation needs Medical: Not on file Non-medical: Not on file Tobacco Use  Smoking status: Former Smoker  Smokeless tobacco: Never Used Substance and Sexual Activity  Alcohol use: Not on file  Drug use: Not on file  Sexual activity: Not on file Lifestyle  Physical activity Days per week: Not on file Minutes per session: Not on file  Stress: Not on file Relationships  Social connections Talks on phone: Not on file Gets together: Not on file Attends [employer] service: Not on file Active member of club or organization: Not on file Attends meetings of clubs or organizations: Not on file Relationship status: Not on file  Intimate partner violence Fear of current or ex partner: Not on file Emotionally abused: Not on file Physically abused: Not on file Forced sexual activity: Not on file Other Topics Concern [address] Service Not Asked  Blood Transfusions Not Asked  Caffeine Concern Not Asked  Occupational Exposure Not Asked [employer] Hazards Not Asked  Sleep Concern Not Asked  Stress Concern Not Asked  Weight Concern Not Asked  Special Diet Not Asked  Back Care Not Asked  Exercise Not Asked  Bike Helmet Not Asked  Seat Belt Not Asked  Self-Exams Not Asked Social History Narrative  Not on file ALLERGIES: Patient has no known allergies. Review of Systems Constitutional: Negative for chills and fever. HENT: Negative for congestion, rhinorrhea and sore throat. Respiratory: Negative for chest tightness, shortness of breath and wheezing. Cardiovascular: Negative for chest pain and palpitations. Gastrointestinal: Negative for abdominal pain, diarrhea, nausea and vomiting. Genitourinary: Negative for difficulty urinating, dysuria, frequency, hematuria and testicular pain. Musculoskeletal: Negative for arthralgias, back pain, myalgias, neck pain and neck stiffness. Skin: Negative for rash and wound. Neurological: Negative for dizziness and headaches. All other systems reviewed and are negative. Vitals:  
 06/03/20 1232 BP: (!) 176/107 Pulse: 72 Resp: 18 Temp: 99.4 °F (37.4 °C) SpO2: 98% Weight: 86.2 kg (190 lb) Height: 5' 8\" (1.727 m) Physical Exam 
Vitals signs and nursing note reviewed. Constitutional:   
   General: He is not in acute distress. Appearance: Normal appearance. He is well-developed. HENT:  
   Head: Normocephalic and atraumatic. Right Ear: External ear normal.  
   Left Ear: External ear normal.  
   Nose: Nose normal.  
Eyes:  
   Conjunctiva/sclera: Conjunctivae normal.  
   Pupils: Pupils are equal, round, and reactive to light. Neck: Musculoskeletal: Normal range of motion and neck supple. Cardiovascular:  
   Rate and Rhythm: Normal rate and regular rhythm. Heart sounds: Normal heart sounds. Pulmonary:  
   Effort: Pulmonary effort is normal. No respiratory distress. Breath sounds: Normal breath sounds. No wheezing. Abdominal:  
   General: Bowel sounds are normal. There is no distension or abdominal bruit. Palpations: Abdomen is soft. Abdomen is not rigid. There is no shifting dullness, hepatomegaly, splenomegaly, mass or pulsatile mass. Tenderness: There is no abdominal tenderness. There is no right CVA tenderness, left CVA tenderness, guarding or rebound. Negative signs include Bertrand's sign and McBurney's sign. Hernia: No hernia is present. Comments: Abdomen exposed for exam. 
Soft no peritoneal signs No pain with palpation Ostomy bag present llq. No signs of infection Genitourinary: 
   Comments: Colmenares in place. No tube or bag present. No penile swelling. No bleeding. No pain with palpation. Musculoskeletal: Normal range of motion. General: No tenderness. Skin: 
   General: Skin is warm and dry. Findings: No erythema or rash. Neurological:  
   General: No focal deficit present. Mental Status: He is alert. Motor: No weakness. Deep Tendon Reflexes: Reflexes are normal and symmetric.   
Psychiatric:     
   Mood and Affect: Mood normal.     
   Behavior: Behavior normal.  
 
  
 
MDM 
 Number of Diagnoses or Management Options Colmenares catheter problem, initial encounter [facility]):  
Diagnosis management comments: This is a 21-year-old male who presented to the emergency room for evaluation problem with his Colmenares catheter. Colmenares catheter was disconnected from the tubing and bag. Patient is having no complaints of pain. His abdomen is soft and nontender. His lungs are clear. He is afebrile. No urinary symptoms. Old Colmenares catheter has been removed Colmenares catheter has been inserted without difficulty. Colmenares is draining. Patient is comfortable with no complaints. Patient is well-hydrated no respiratory distress. Physical exam no signs of serious illness at this time. Will discharge home with PCP follow-up in the next 1 to 2 days. Patient's results have been reviewed with them. Patient and/or family have verbally conveyed their understanding and agreement of the patient's signs, symptoms, diagnosis, treatment and prognosis and additionally agree to follow up as recommended or return to the Emergency Room should their condition change prior to follow-up. Discharge instructions have also been provided to the patient with some educational information regarding their diagnosis as well a list of reasons why they would want to return to the ER prior to their follow-up appointment should their condition change. Procedures Pt has been seen and evaluated by attending physician who has reviewed lab work and imaging tests. He agrees with discharge and prescription plan.

## 2020-06-03 NOTE — PROGRESS NOTES
6/3/2020 
4:44 PM 
Case management note Nurse had been unable to get family on the phone. I called [facility] for additional numbers. Still unable to reach. Step daughter called and stated she was coming to get patient. About 15 minutes later she called back and asked about placement. I started explaining to her he was not admitted and she hung up phone. She showed up a few minutes later to  patient. [place]  called, informed her of conversation. They are sending nurse out tonight to check on patient. She had found placement today, but daughter wants him in nursing home. Will continue to follow. Tarik Weldon

## 2020-06-03 NOTE — DISCHARGE INSTRUCTIONS
Patient Education        Learning About How to Care for a Person's Indwelling Urinary Catheter  Introduction     A urinary catheter is a flexible plastic tube used to drain urine from the bladder when a person cannot urinate. A doctor will place the catheter into the bladder by inserting it through the urethra. The urethra is the opening that carries urine from the bladder to the outside of the body. When the catheter is in the bladder, a small balloon is used to keep the catheter in place. The catheter lets urine drain from the bladder into a collection bag. Urinary catheters can be used in both men and women. A catheter that stays in place for a longer period of time is called an indwelling catheter. A catheter may be needed because of certain medical conditions. These include an enlarged prostate or problems controlling urine. It may be used after surgery on the pelvis or urinary tract. Urinary catheters are also used when the lower part of the body is paralyzed. When helping a loved one with a catheter, try to be relaxed. Caring for a catheter can be embarrassing for both of you. If you are calm and don't seem embarrassed, the person may feel more comfortable. How do you take care of the catheter? Wear disposable gloves when handling someone's catheter. Make sure to follow all of the instructions the doctor has given. And always wash your hands before and after you're done. Here are some other things to remember when caring for someone's catheter:  · Make sure that urine is running out of the catheter into the urine collection bag. And make sure that the catheter tubing does not get twisted or bent. · Keep the urine collection bag below the level of the bladder. At night it may be helpful to hang the bag on the side of the bed. · Make sure that the urine collection bag does not drag and pull on the catheter.   · It is okay to shower with a catheter and urine collection bag in place, unless the doctor says not to. · Check for swelling or signs of infection in the area around the catheter. Signs of infection include pus or irritated, swollen, red, or tender skin. · Clean the area around the catheter twice a day with soap and water. Dry with a clean towel afterward. · Do not apply powder or lotion to the skin around the catheter. · Do not tug or pull on the catheter. · Sexual intercourse may still be possible for individuals who wear a catheter. It is best to talk with a doctor about options. How do you empty the bag? The urine collection bag needs to be emptied regularly. It is best to empty the bag when it's about half full or at bedtime. If the doctor has asked you to measure the amount of urine, do that before you empty the urine into the toilet. When you are ready to empty the bag, follow these steps:  1. Put on disposable gloves. 2. Remove the drain spout from its sleeve at the bottom of the collection bag. Open the valve on the spout. 3. Let the urine flow out of the bag and into the toilet or a container. Do not let the tubing or drain spout touch anything. 4. After you empty the bag, close the valve and put the drain spout back into its sleeve. 5. Remove your gloves and throw them away. 6. Wash your hands with soap and water. How do you care for someone after the catheter is removed? After the catheter is taken out, the person may have trouble urinating. If this happens, try helping them sit in a few inches of warm water (sitz bath). If the urge to urinate comes during the sitz bath, it may be easier for them to urinate while still in the bath. Some burning may happen the first few times the person urinates. If the burning lasts longer, it may be a sign of an infection. If the catheter causes irritation or a rash, wearing loose, cotton underwear may help. Watch closely for changes in the person's health, and be sure to contact their doctor if you notice any problems.   Where can you learn more?  Go to http://penny-argenis.info/  Enter X535 in the search box to learn more about \"Learning About How to Care for a Person's Indwelling Urinary Catheter. \"  Current as of: December 9, 2019               Content Version: 12.5  © 7738-3525 [employer]. Care instructions adapted under license by [employer] (which disclaims liability or warranty for this information). If you have questions about a medical condition or this instruction, always ask your healthcare professional. [address] any warranty or liability for your use of this information.

## 2020-06-03 NOTE — ED TRIAGE NOTES
Pt arrives via EMS after pulling out his lopez catheter. Pt is on hospice and has stage 4 colon cancer. Pt also has an ostomy bag. After  Assessing pt it is noted that catheter is still in pt, but tubing and bag is missing.

## 2020-07-06 NOTE — ED PROVIDER NOTES
HPI patient is a 71-year-old male with past medical history significant for stage IV colon cancer who presents to the ED via EMS after taking a fall while sitting on the toilet this morning at home. His daughter, whom he lives with, called EMS, and he was transported to the ED for evaluation. Is uncertain whether there was any LOC; patient denies headache or neck pain. There is no obvious scalp injury/head injury. He denies any abdominal pain or back pain. He is moving his extremities without difficulty. His stoma is functioning; he is urinating on his own and no longer has a Colmenares catheter. Patient is a poor historian. Past Medical History:  
Diagnosis Date  History of vascular access device 05/04/2020 [facility] VAT 5Fr Triple PICC Right brachial 43cm for TPN Past Surgical History:  
Procedure Laterality Date 2021 Dee Becerra N/A 5/1/2020 SIGMOIDOSCOPY FLEXIBLE performed by Beto Cage MD at [address] No family history on file. Social History Socioeconomic History  Marital status: SINGLE Spouse name: Not on file  Number of children: Not on file  Years of education: Not on file  Highest education level: Not on file Occupational History  Not on file Social Needs  Financial resource strain: Not on file  Food insecurity Worry: Not on file Inability: Not on file  Transportation needs Medical: Not on file Non-medical: Not on file Tobacco Use  Smoking status: Former Smoker  Smokeless tobacco: Never Used Substance and Sexual Activity  Alcohol use: Not on file  Drug use: Not on file  Sexual activity: Not on file Lifestyle  Physical activity Days per week: Not on file Minutes per session: Not on file  Stress: Not on file Relationships  Social connections Talks on phone: Not on file Gets together: Not on file Attends [employer] service: Not on file Active member of club or organization: Not on file Attends meetings of clubs or organizations: Not on file Relationship status: Not on file  Intimate partner violence Fear of current or ex partner: Not on file Emotionally abused: Not on file Physically abused: Not on file Forced sexual activity: Not on file Other Topics Concern [address] Service Not Asked  Blood Transfusions Not Asked  Caffeine Concern Not Asked  Occupational Exposure Not Asked [employer] Hazards Not Asked  Sleep Concern Not Asked  Stress Concern Not Asked  Weight Concern Not Asked  Special Diet Not Asked  Back Care Not Asked  Exercise Not Asked  Bike Helmet Not Asked  Seat Belt Not Asked  Self-Exams Not Asked Social History Narrative  Not on file ALLERGIES: Patient has no known allergies. Review of Systems Unable to perform ROS: Other All other systems reviewed and are negative. Vitals:  
 07/06/20 1045 BP: 119/75 Pulse: 79 Resp: 16 Temp: 98.8 °F (37.1 °C) SpO2: 96% Weight: 86.2 kg (190 lb) Height: 5' 8\" (1.727 m) Physical Exam 
Vitals signs and nursing note reviewed. Constitutional:   
   General: He is not in acute distress. Appearance: Normal appearance. He is normal weight. He is not ill-appearing, toxic-appearing or diaphoretic. Comments: Elderly black male, non-smoker; lives with his adult daughter HENT:  
   Head: Normocephalic. Right Ear: Tympanic membrane normal.  
   Left Ear: Tympanic membrane normal.  
   Nose: Nose normal.  
   Mouth/Throat:  
   Mouth: Mucous membranes are moist.  
   Pharynx: No posterior oropharyngeal erythema. Neck: Musculoskeletal: Normal range of motion and neck supple. Cardiovascular:  
   Rate and Rhythm: Normal rate and regular rhythm. Pulmonary:  
   Effort: Pulmonary effort is normal.  
   Breath sounds: Normal breath sounds. Abdominal: General: Bowel sounds are normal.  
   Tenderness: There is no abdominal tenderness. Comments: Stoma appears normal and is functioning. Musculoskeletal: Normal range of motion. Lymphadenopathy:  
   Cervical: No cervical adenopathy. Skin: 
   General: Skin is warm and dry. Findings: No rash. Neurological:  
   General: No focal deficit present. Mental Status: He is alert and oriented to person, place, and time. MDM Procedures EKG reveals normal sinus rhythm with nonspecific ST and T wave abnormality; no acute changes noted; without ectopy; ventricular rate of 82. Reviewed by Dr. Gregorio De La Paz Patient has been reexamined after IV fluids and medications. He is eating crackers and drinking ginger ale without difficulty. He was offered admission but prefers to go home. He was given a gram of Rocephin IV and will be sent home on oral antibiotics for treatment of UTI. Urine culture pending. Recommend close follow-up with his PCP. Discussed plan of care with Dr. Gregorio De La Paz. 
2:23 PM 
Patient's results and plan of care have been reviewed with him. Patient and/or family have verbally conveyed their understanding and agreement of the patient's signs, symptoms, diagnosis, treatment and prognosis and additionally agree to follow up as recommended or return to the Emergency Room should his condition change prior to follow-up. Discharge instructions have also been provided to the patient with some educational information regarding his diagnosis as well a list of reasons why he would want to return to the ER prior to his follow-up appointment should his condition change. Jocelyn Melendez NP

## 2020-07-06 NOTE — DISCHARGE INSTRUCTIONS
Patient Education        Anemia: Care Instructions  Your Care Instructions     Anemia is a low level of red blood cells, which carry oxygen throughout your body. Many things can cause anemia. Lack of iron is one of the most common causes. Your body needs iron to make hemoglobin, a substance in red blood cells that carries oxygen from the lungs to your body's cells. Without enough iron, the body produces fewer and smaller red blood cells. As a result, your body's cells do not get enough oxygen, and you feel tired and weak. And you may have trouble concentrating. Bleeding is the most common cause of a lack of iron. You may have heavy menstrual bleeding or bleeding caused by conditions such as ulcers, hemorrhoids, or cancer. Regular use of aspirin or other anti-inflammatory medicines (such as ibuprofen) also can cause bleeding in some people. A lack of iron in your diet also can cause anemia, especially at times when the body needs more iron, such as during pregnancy, infancy, and the teen years. Your doctor may have prescribed iron pills. It may take several months of treatment for your iron levels to return to normal. Your doctor also may suggest that you eat foods that are rich in iron, such as meat and beans. There are many other causes of anemia. It is not always due to a lack of iron. Finding the specific cause of your anemia will help your doctor find the right treatment for you. Follow-up care is a key part of your treatment and safety. Be sure to make and go to all appointments, and call your doctor if you are having problems. It's also a good idea to know your test results and keep a list of the medicines you take. How can you care for yourself at home? · Take your medicines exactly as prescribed. Call your doctor if you think you are having a problem with your medicine.   · If your doctor recommends iron pills, take them as directed:  ? Try to take the pills on an empty stomach about 1 hour before or 2 hours after meals. But you may need to take iron with food to avoid an upset stomach. ? Do not take antacids or drink milk or caffeine drinks (such as coffee, tea, or cola) at the same time or within 2 hours of the time that you take your iron. They can make it hard for your body to absorb the iron. ? Vitamin C (from food or supplements) helps your body absorb iron. Try taking iron pills with a glass of orange juice or some other food that is high in vitamin C, such as citrus fruits. ? Iron pills may cause stomach problems, such as heartburn, nausea, diarrhea, constipation, and cramps. Be sure to drink plenty of fluids, and include fruits, vegetables, and fiber in your diet each day. Iron pills often make your bowel movements dark or green. ? If you forget to take an iron pill, do not take a double dose of iron the next time you take a pill. ? Keep iron pills out of the reach of small children. An overdose of iron can be very dangerous. · Follow your doctor's advice about eating iron-rich foods. These include red meat, shellfish, poultry, eggs, beans, raisins, whole-grain bread, and leafy green vegetables. · Steam vegetables to help them keep their iron content. When should you call for help? [id number] anytime you think you may need emergency care. For example, call if:  · You have symptoms of a heart attack. These may include:  ? Chest pain or pressure, or a strange feeling in the chest.  ? Sweating. ? Shortness of breath. ? Nausea or vomiting. ? Pain, pressure, or a strange feeling in the back, neck, jaw, or upper belly or in one or both shoulders or arms. ? Lightheadedness or sudden weakness. ? A fast or irregular heartbeat. After you call 911, the  may tell you to chew 1 adult-strength or 2 to 4 low-dose aspirin. Wait for an ambulance. Do not try to drive yourself. · You passed out (lost consciousness).   Call your doctor now or seek immediate medical care if:  · You have new or increased shortness of breath. · You are dizzy or lightheaded, or you feel like you may faint. · Your fatigue and weakness continue or get worse. · You have any abnormal bleeding, such as:  ? Nosebleeds. ? Vaginal bleeding that is different (heavier, more frequent, at a different time of the month) than what you are used to.  ? Bloody or black stools, or rectal bleeding. ? Bloody or pink urine. Watch closely for changes in your health, and be sure to contact your doctor if:  · You do not get better as expected. Where can you learn more? Go to http://www.devries.com/  Enter R301 in the search box to learn more about \"Anemia: Care Instructions. \"  Current as of: November 8, 2019               Content Version: 12.5  © 5367-8206 [employer]. Care instructions adapted under license by [employer] (which disclaims liability or warranty for this information). If you have questions about a medical condition or this instruction, always ask your healthcare professional. [place] [address] any warranty or liability for your use of this information. Patient Education        Preventing Falls: Care Instructions  Your Care Instructions     Getting around your home safely can be a challenge if you have injuries or health problems that make it easy for you to fall. Loose rugs and furniture in walkways are among the dangers for many older people who have problems walking or who have poor eyesight. People who have conditions such as arthritis, osteoporosis, or dementia also have to be careful not to fall. You can make your home safer with a few simple measures. Follow-up care is a key part of your treatment and safety. Be sure to make and go to all appointments, and call your doctor if you are having problems. It's also a good idea to know your test results and keep a list of the medicines you take. How can you care for yourself at home?   Taking care of yourself  · You may get dizzy if you do not drink enough water. To prevent dehydration, drink plenty of fluids, enough so that your urine is light yellow or clear like water. Choose water and other caffeine-free clear liquids. If you have kidney, heart, or liver disease and have to limit fluids, talk with your doctor before you increase the amount of fluids you drink. · Exercise regularly to improve your strength, muscle tone, and balance. Walk if you can. Swimming may be a good choice if you cannot walk easily. · Have your vision and hearing checked each year or any time you notice a change. If you have trouble seeing and hearing, you might not be able to avoid objects and could lose your balance. · Know the side effects of the medicines you take. Ask your doctor or pharmacist whether the medicines you take can affect your balance. Sleeping pills or sedatives can affect your balance. · Limit the amount of alcohol you drink. Alcohol can impair your balance and other senses. · Ask your doctor whether calluses or corns on your feet need to be removed. If you wear loose-fitting shoes because of calluses or corns, you can lose your balance and fall. · Talk to your doctor if you have numbness in your feet. Preventing falls at home  · Remove raised doorway thresholds, throw rugs, and clutter. Repair loose carpet or raised areas in the floor. · Move furniture and electrical cords to keep them out of walking paths. · Use nonskid floor wax, and wipe up spills right away, especially on ceramic tile floors. · If you use a walker or cane, put rubber tips on it. If you use crutches, clean the bottoms of them regularly with an abrasive pad, such as steel wool. · Keep your house well lit, especially [place], and outside walkways. Use night-lights in areas such as hallways and bathrooms.  Add extra light switches or use remote switches (such as switches that go on or off when you clap your hands) to make it easier to turn lights on if you have to get up during the night. · Install sturdy handrails on stairways. · Move items in your cabinets so that the things you use a lot are on the lower shelves (about waist level). · Keep a cordless phone and a flashlight with new batteries by your bed. If possible, put a phone in each of the main rooms of your house, or carry a cell phone in case you fall and cannot reach a phone. Or, you can wear a device around your neck or wrist. You push a button that sends a signal for help. · Wear low-heeled shoes that fit well and give your feet good support. Use footwear with nonskid soles. Check the heels and soles of your shoes for wear. Repair or replace worn heels or soles. · Do not wear socks without shoes on wood floors. · Walk on the grass when the sidewalks are slippery. If you live in an area that gets snow and ice in the winter, sprinkle salt on slippery steps and sidewalks. Preventing falls in the bath  · Install grab bars and nonskid mats inside and outside your shower or tub and near the toilet and sinks. · Use shower chairs and bath benches. · Use a hand-held shower head that will allow you to sit while showering. · Get into a tub or shower by putting the weaker leg in first. Get out of a tub or shower with your strong side first.  · Repair loose toilet seats and consider installing a raised toilet seat to make getting on and off the toilet easier. · Keep your bathroom door unlocked while you are in the shower. Where can you learn more? Go to http://www.WellnessFX.com/  Enter G117 in the search box to learn more about \"Preventing Falls: Care Instructions. \"  Current as of: August 7, 2019               Content Version: 12.5  © 6218-6461 [employer]. Care instructions adapted under license by [employer] (which disclaims liability or warranty for this information).  If you have questions about a medical condition or this instruction, always ask your healthcare professional. [place] [address] any warranty or liability for your use of this information. Patient Education        Urinary Tract Infections in Men: Care Instructions  Your Care Instructions     A urinary tract infection, or UTI, is a general term for an infection anywhere between the kidneys and the tip of the penis. UTIs can also be a result of a prostate problem. Most cause pain or burning when you urinate. Most UTIs are caused by bacteria and can be cured with antibiotics. It is important to complete your treatment so that the infection does not get worse. Follow-up care is a key part of your treatment and safety. Be sure to make and go to all appointments, and call your doctor if you are having problems. It's also a good idea to know your test results and keep a list of the medicines you take. How can you care for yourself at home? · Take your antibiotics as prescribed. Do not stop taking them just because you feel better. You need to take the full course of antibiotics. · Take your medicines exactly as prescribed. Your doctor may have prescribed a medicine, such as phenazopyridine (Pyridium), to help relieve pain when you urinate. This turns your urine orange. You may stop taking it when your symptoms get better. But be sure to take all of your antibiotics, which treat the infection. · Drink extra water for the next day or two. This will help make the urine less concentrated and help wash out the bacteria causing the infection. (If you have kidney, heart, or liver disease and have to limit your fluids, talk with your doctor before you increase your fluid intake.)  · Avoid drinks that are carbonated or have caffeine. They can irritate the bladder. · Urinate often. Try to empty your bladder each time. · To relieve pain, take a hot bath or lay a heating pad (set on low) over your lower belly or genital area.  Never go to sleep with a heating pad in place. To help prevent UTIs  · Drink plenty of fluids, enough so that your urine is light yellow or clear like water. If you have kidney, heart, or liver disease and have to limit fluids, talk with your doctor before you increase the amount of fluids you drink. · Urinate when you have the urge. Do not hold your urine for a long time. Urinate before you go to sleep. · Keep your penis clean. Catheter care  If you have a drainage tube (catheter) in place, the following steps will help you care for it. · Always wash your hands before and after touching your catheter. · Check the area around the urethra for inflammation or signs of infection. Signs of infection include irritated, swollen, red, or tender skin, or pus around the catheter. · Clean the area around the catheter with soap and water two times a day. Dry with a clean towel afterward. · Do not apply powder or lotion to the skin around the catheter. To empty the urine collection bag   · Wash your hands with soap and water. · Without touching the drain spout, remove the spout from its sleeve at the bottom of the collection bag. Open the valve on the spout. · Let the urine flow out of the bag and into the toilet or a container. Do not let the tubing or drain spout touch anything. · After you empty the bag, clean the end of the drain spout with tissue and water. Close the valve and put the drain spout back into its sleeve at the bottom of the collection bag. · Wash your hands with soap and water. When should you call for help? Call your doctor now or seek immediate medical care if:  · Symptoms such as a fever, chills, nausea, or vomiting get worse or happen for the first time. · You have new pain in your back just below your rib cage. This is called flank pain. · There is new blood or pus in your urine. · You are not able to take or keep down your antibiotics.   Watch closely for changes in your health, and be sure to contact your doctor if:  · You are not getting better after taking an antibiotic for 2 days. · Your symptoms go away but then come back. Where can you learn more? Go to http://www.gray.com/  Enter X516 in the search box to learn more about \"Urinary Tract Infections in Men: Care Instructions. \"  Current as of: August 22, 2019               Content Version: 12.5  © 6191-6699 [employer]. Care instructions adapted under license by [employer] (which disclaims liability or warranty for this information). If you have questions about a medical condition or this instruction, always ask your healthcare professional. [place] [address] any warranty or liability for your use of this information.

## 2020-07-06 NOTE — ED NOTES
Spoke to Sharon who is aware of patient d/c and instructions and states there will be someone at home to receive patient after transport.

## 2020-07-06 NOTE — ED TRIAGE NOTES
Pt reports weakness and GLF that occurred this morning while sitting on toilet. Denies hitting head, LOC, or blood thinners. + rectal bleeding but states this is normal d/t his rectal cancer.

## 2020-07-07 NOTE — PROGRESS NOTES
ACM contacted patient for ER follow up / [employer] teaching. Patient has dementia. Spoke with his caregiver Mel Burk . She states she is with a nurse at the moment and requests a call back later this afternoon. ACM will return call this afternoon .

## 2020-07-07 NOTE — PROGRESS NOTES
Patient contacted regarding recent discharge and COVID-19 risk. Discussed COVID-19 related testing which was not done at this time. Test results were not done. Patient informed of results, if available? no   
Care Transition Nurse/ Ambulatory Care Manager contacted the caregiver by telephone to perform post discharge assessment. Verified name and  with caregiver as identifiers. Patient has following risk factors of: immunocompromised. CTN/ACM reviewed discharge instructions, medical action plan and red flags related to discharge diagnosis. Reviewed and educated them on any new and changed medications related to discharge diagnosis. Advised obtaining a 90-day supply of all daily and as-needed medications. Education provided regarding infection prevention, and signs and symptoms of COVID-19 and when to seek medical attention with caregiver who verbalized understanding. Discussed exposure protocols and quarantine from [address] you at higher risk for severe illness  and given an opportunity for questions and concerns. The caregiver agrees to contact the COVID-19 hotline [phone] or PCP office for questions related to their healthcare. CTN/ACM provided contact information for future reference. From CDC: Are you at higher risk for severe illness?  Wash your hands often.  Avoid close contact (6 feet, which is about two arm lengths) with people who are sick.  Put distance between yourself and other people if COVID-19 is spreading in your community.  Clean and disinfect frequently touched surfaces.  Avoid all cruise travel and non-essential air travel.  Call your healthcare professional if you have concerns about COVID-19 and your underlying condition or if you are sick. For more information on steps you can take to protect yourself, see CDC's How to Protect Yourself Patient/family/caregiver given information for [employer] and agrees to enroll yes Patient's preferred e-mail:  [email or web address] Patient's preferred phone number: [phone] Based on Loop alert triggers, patient will be contacted by nurse care manager for worsening symptoms. Pt will be further monitored by COVID Loop Team based on severity of symptoms and risk factors. Spoke with patients caregiver Felisa Sanches. Rx's provided from ER were filled. Caregiver inquired if leg xray was done in the ER as patient is having leg pain . Provided results from head CT and that this was the imaging done. Caregiver notes may need to return to ER for imaging . Provided that a better option would be [facility] . This patient is on hospice. Patients caregiver to call [facility] for further evaluation . COVID teaching provided. No further questions/ concerns. Call was brief. ACM unable to review medical decision maker during this call. As patient is currently on hospice ACM will not return call in 14 days and will close episode at this time.

## 2020-07-31 PROBLEM — C20 MALIGNANT NEOPLASM OF RECTUM (HCC): Status: ACTIVE | Noted: 2020-01-01

## 2020-07-31 NOTE — WOUND CARE
Wound care consult Admitted for hospice respite care Alert, no distress- patient is familiar to ostomy care nurse from previous admission Alert no distress On Babar M/ S surface Assessment All skin folds and bony prominences assessed, turned with staff assistance. 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 Treatment One piece appliance replaced Repositioned in bed Heels floated Recommendations/Plan Turn, reposition every 2 hours as tolerated, float heels Incontinent care --- Apply Zinc to all open areas, moisture barrier as needed. Routine ostomy care Dry dressing to mucus fistula Will follow, reconsult as needed.  
 
[facility]

## 2020-07-31 NOTE — HOSPICE
[facility] Good Help to Those in Need 
[phone] Respite Nursing Note Patient Name: Krystina Padilla YOB: 1940 Age: 67 y.o. Date of Visit: 07/31/20 Facility of Care: [facility] Patient Room: [facility]/01 Hospice Attending: Lyn Link MD 
Hospice Diagnosis: Malignant neoplasm of rectum ([facility] [address] sOorio Lambert Level of Care: Respite ASSESSMENT & PLAN 1. Patient admitted to Respite level of care due to:  
 [x] Caregiver Exhaustion: Family caretakers required to provide increased ADL activity for patient with increased need for symptom management. 2. Provide education and support to unit staff caring for hospice patient and family. Provide staff with direct contact information to reach hospice team [phone] 3. Provide support and frequent rounds for patient comfort and safety ongoing, and fall precautions, as patient has been more confused at home, per home care team. 
4. Provide  support ongoing 3. Continue with PRN oral medications as per home use. Orders have been updated. 6. Maintain skin integrity as tolerated for hospice patient, turning and repositioning for comfort, and specialty mattress if appropriate. Wound care nurse to visit with patient and provide recommendations for ostomy care. Nursing Interventions: Reviewed respite orders with Dr. Kimberlee Garza. Education provided to floor nursing staff. Wound care nurse to visit with patient today. Spiritual Interventions: Hospital  to make bedside visit today for support Psych/ Social/ Emotional Interventions: Pt admitted for respite care due to caregiver exhaustion. Hospice Social worker support ongoing. Care Coordination Needs: Hospice Home Team: Journey. Plan to update home hospice team with plan of care changes. Home Hospice team will arrange transportation for patient at the end of his respite stay. Respite stay due to end on 8-4-2020. Care Plan and New Orders Discussed / Approved with Jyothi Montesinos MD. Description History and Chart Review List number of doses of PRN medications in last 24 hours: 
Medication 1: Oxycodone 20mg Number of doses: 1 upon arrival to 5th floor Medication 2:  
Number of doses: 
 
Medication 3:  
Number of doses: 
 
DISCHARGE PLANNING 1. Discharge Plan: Patient will return home to current living situation after 5 day respite stay is over. 2. Patient/Family teaching: Home team has provided this information to patient and family. 3. Response to patient/family teaching: Patient is able to repeat back respite stay plan of care to this liaison nurse. ASSESSMENT   
KARNOFSKY: 40 
 
 
 
Prognosis estimated based on 07/31/20 clinical assessment is:  
 
[x] Weeks to Months Quality Measure: Patient self-reports:  [x]  Yes   []  No 
 
ESAS:  
Time of Assessment: 15:30 Pain (1-10):8 Fatigue (1-10): 5 Shortness of breath (1-10):0 Nausea (1-10): 0 Appetite (1-10): Anxiety: (1-10): Depression: (1-10): Well-being: (1-10): Constipation:   X  No 
 
CLINICAL INFORMATION No data found. Currently this patient has: 
[] Supplemental O2  
[] IV   
[] PICC [] PORT  
[] NG Tube   
[] PEG Tube  
[x] Ostomy   Wound care consult ordered for today. [] Colmenares draining _______ urine 
[] Other SIGNS/PHYSICAL FINDINGS Skin: 
[x] Warm, dry, supple, intact and color normal for race 
[] Warm  
[] Dry  
[] Cool    
[] Clammy      
[] Diaphoretic Turgor [x] Normal 
 [] Decreased Color: 
 [] Pink 
 [] Pale 
 [] Cyanotic 
 [] Erythema 
 [] Jaundice [x] Normal for Race 
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 Will follow wound care RN recommendations after consult Neuro: Alert 
[] Lethargy 
[] Restlessness / agitation 
[] Confusion / delirium 
[] Hallucinations 
[] Responds to maximal stimulation 
[] Unresponsive 
[] Seizures Cardiac: 
[] Dyspnea on Exertion 
[] JVD [] Murmur 
[] Palpitations [] Hypotension 
[] Hypertension 
[] Tachycardia [] Bradycardia 
[] Irregular HR 
[] Pulses Decreased 
[] Pulses Absent 
[] Edema:      None 
[] Mottling:      
 
Respiratory: 
Breath sounds:  
 [x] Diminished 
 [] Wheeze 
 [] Rhonchi 
 [] Rales [x] Even and unlabored 
[] Labored:           
[] Cough 
 [] Non Productive 
 [] Productive 
  [] Description:          
[] Deep suctioned  
[] O2 at ___ LPM 
[] High flow oxygen greater than 10 LPM 
[] Bi-Pap GI: 
[x] Abdomen soft, moderately tender with ostomy care 
[] Ascites 
[] Nausea 
[] Vomiting 
[] Incontinent of bowels [x] Bowel sounds yes [] Diarrhea 
[] Constipation (see above including last bowel movement) [] Checked for impaction 
[x] Last BM 7-: Ostomy bag changed upon arrival. 
 
 
Nutrition Diet:___Regular diet ordered. Pt states that he has an appetite and is hungry. Appetite:  
[] Good  
[] Fair  
[] Poor  
[] Tube Feeding : 
[x] Voiding Per patient and confirmed with home team nurse 
[] Incontinent  
[] Colmenares Musculoskeletal 
[x] Balance/[place] Unsteady  
[] Weak Strength:  
 [] Normal  
 [x] Limited  
 [] Decreasing Activities:  
 [x] Up as tolerated 
 [] Bedridden  
 [x] Specify:Per Hospice home team nurse, patient has been up with family assistance to care for ADL activities, however, he has been more confused at home. Education for patient and staff as to high fall risk. SAFETY [x] 24 hr. Caregiver [x] Side rails ? [x] Hospital bed  
[x] Reviewed Falls & Safety ALLERGIES AND MEDICATIONS Allergies: No Known Allergies Current Facility-Administered Medications Medication Dose Route Frequency  haloperidol (HALDOL) 2 mg/mL oral solution 1 mg  1 mg SubLINGual Q6H PRN  
 ondansetron (ZOFRAN ODT) tablet 4 mg  4 mg Oral Q6H PRN  
 LORazepam (INTENSOL) 2 mg/mL oral concentrate 0.5 mg  0.5 mg SubLINGual Q4H PRN  
 acetaminophen (TYLENOL) tablet 650 mg  650 mg Oral Q4H PRN  
  hyoscyamine SL (LEVSIN/SL) tablet 0.125 mg  0.125 mg SubLINGual Q4H PRN  
 senna-docusate (PERICOLACE) 8.6-50 mg per tablet 2 Tab  2 Tab Oral BID PRN  
 oxyCODONE IR (ROXICODONE) tablet 20 mg  20 mg Oral Q4H PRN  
 labetaloL (NORMODYNE) tablet 100 mg  100 mg Oral Q12H  
 [START ON 8/1/2020] pantoprazole (PROTONIX) tablet 40 mg  40 mg Oral DAILY  potassium chloride SR (KLOR-CON 10) tablet 10 mEq  10 mEq Oral BID  QUEtiapine (SEROquel) tablet 25 mg  25 mg Oral QHS  prochlorperazine (COMPAZINE) tablet 10 mg  10 mg Oral Q6H PRN  
 sodium bicarbonate tablet 650 mg  650 mg Oral BID Visit Time In: 15:00 Visit Time Out: 16:00 Amanda Alves RN, [facility] Hospice Nurse Liaison [phone] [place] [phone] Office

## 2020-07-31 NOTE — ROUTINE PROCESS
Bedside shift change report given to Mihaela Henriquez (oncoming nurse) by Catie Webster (offgoing nurse). Report included the following information SBAR, Kardex, Procedure Summary, Intake/Output, MAR, Accordion, Recent Results and Med Rec Status.

## 2020-07-31 NOTE — ROUTINE PROCESS
Spoke to hospice nurse Main Goodman) she stated it was not necessary to place an IV. Patient can take all meds orally.

## 2020-07-31 NOTE — H&P
[facility] to Those in Need 
[phone] Patient Name: Demetris Espinoza YOB: 1940 Date of Provider Hospice Visit: 07/31/20 Level of Care:   [] General Inpatient (GIP)    [] Routine   [x] Respite Current Location of Care: 
[] [facility] [x] [facility] [] [facility] [] [facility] [] Hospice [facility] Hospice Diagnosis: Neoplasm of rectum and colon Wilmer Turner Chart Reviewed for patient's medical history and hospice care plan. Hospice Physician Certification/Recertification Narrative per Baird Alpers / brock MD:  
Patient admitted to hospice care secondary to malignant neoplasm of the rectum and the colon. Admission was on 7/23/2020. Patient admitted to respite care secondary to caregiver exhaustion as well as concern about pain management. Per home hospice nurse, patient has had issues with increased lethargy delusional thoughts and even combative. He had been started on a transdermal fentanyl patch and there was concern that this may have caused some of this symptoms. Patient showing increased fatigue and weakness. Patient separately brought to the hospital for respite care secondary exhaustion. Patient seen by hospice liaison at [facility] peers to be stable. We will order PRN medication for pain but consider long-acting opioid after is assessed over the next 48 hours on needs. Patient being admitted for Respite care x 5 days for  
[x]  Caregiver exhaustion and needing break 
[]  Caregiver unavailable PLAN 1. Patient's home medications were reviewed and reconciled. Continue with current home medications and plan of care as outlined in chart. 2.  and SW to support family needs. 3. Disposition: Home with hospice once respite stay is completed.

## 2020-08-01 NOTE — PROGRESS NOTES
Problem: Falls - Risk of 
Goal: *Absence of Falls Description: Document Mason Diazshire Fall Risk and appropriate interventions in the flowsheet. Outcome: Progressing Towards Goal 
Note: Fall Risk Interventions: 
Mobility Interventions: Bed/chair exit alarm, Communicate number of staff needed for ambulation/transfer, OT consult for ADLs, Patient to call before getting OOB, PT Consult for mobility concerns, PT Consult for assist device competence, Utilize walker, cane, or other assistive device, Utilize gait belt for transfers/ambulation Medication Interventions: Bed/chair exit alarm, Evaluate medications/consider consulting pharmacy, Patient to call before getting OOB, Teach patient to arise slowly, Utilize gait belt for transfers/ambulation Elimination Interventions: Bed/chair exit alarm, Call light in reach, Patient to call for help with toileting needs, Toileting schedule/hourly rounds Problem: Patient Education: Go to Patient Education Activity Goal: Patient/Family Education Outcome: Progressing Towards Goal 
  
Problem: Pressure Injury - Risk of 
Goal: *Prevention of pressure injury Description: Document Baltazar Scale and appropriate interventions in the flowsheet. Outcome: Progressing Towards Goal 
Note: Pressure Injury Interventions: Activity Interventions: Increase time out of bed, PT/OT evaluation, Pressure redistribution bed/mattress(bed type), Assess need for specialty bed Mobility Interventions: HOB 30 degrees or less, Pressure redistribution bed/mattress (bed type), PT/OT evaluation, Assess need for specialty bed, Turn and reposition approx. every two hours(pillow and wedges) Nutrition Interventions: Document food/fluid/supplement intake, Offer support with meals,snacks and hydration Friction and Shear Interventions: HOB 30 degrees or less, Lift sheet, Lift team/patient mobility team, Apply protective barrier, creams and emollients, Transferring/repositioning devices

## 2020-08-01 NOTE — PROGRESS NOTES
Bedside and Verbal shift change report given to Farhan Franklin RN (oncoming nurse) by iMriam Levy RN (offgoing nurse). Report included the following information SBAR, Kardex, Intake/Output, MAR, Accordion and Recent Results.

## 2020-08-02 NOTE — ROUTINE PROCESS
Bedside shift change report given to [place] (oncoming nurse) by Jerzy Zhong (offgoing nurse). Report included the following information SBAR, Kardex, Procedure Summary, Intake/Output, MAR, Accordion, Recent Results and Med Rec Status.

## 2020-08-02 NOTE — HOSPICE
[facility] Good Help to Those in Need 
[phone] Respite Nursing Note Patient Name: Karma Argueta YOB: 1940 Age: 67 y.o. Date of Visit: 08/02/20 Facility of Care: [facility] Patient Room: [facility]/ Hospice Attending: Juan Cotto MD 
Hospice Diagnosis: Malignant neoplasm of rectum ([address] Jean Claude Kami Level of Care: Respite ASSESSMENT & PLAN 1. Patient admitted to Respite level of care due to:  
 [x] Caregiver Exhaustion: Patient requring increased care needs, family very tired and needing a break.  
 [] Caregiver Breakdown: 2. Continue PO medications, adjust if needed Nursing Interventions: Nurses to help with ADL's of patient, nursing to perform assessments on patient, contact hospice with any acute changes in patient condition. Spiritual Interventions:  to see patient as needed. Psych/ Social/ Emotional Interventions: Home MSW following Care Coordination Needs: Communicate with home team/hospital team/family Care Plan and New Orders Discussed / Approved with Dr. Marcela Fonseca MD. Description History and Chart Review List number of doses of PRN medications in last 24 hours: 
Medication 1: 
Number of doses: 
 
Medication 2:  
Number of doses: 
 
Medication 3:  
Number of doses: 
 
DISCHARGE PLANNING 1. Discharge Plan: home when respite stay ends 2. Patient/Family teaching: no family present, patient sleeping 3. Response to patient/family teaching: n/a ASSESSMENT   
KARNOFSKY: 30 
 
FAST: N/A Prognosis estimated based on 08/02/20 clinical assessment is:  
[] Few to Many Hours [] Hours to Days  
[] Few to Many Days  
[] Days to Weeks  
[] Few to Many Weeks [x] Weeks to Months  
[] Few to Many Months Quality Measure: Patient self-reports:  [x]  Yes   []  No 
 
ESAS:  
Time of Assessment: 6727 Pain (1-10) 4 Fatigue (1-10): 7 Shortness of breath (1-10):0 Nausea (1-10): 0 Appetite (1-10):  3 Anxiety: (1-10): 2 Depression: (1-10): 0 Well-being: (1-10): 7 Constipation: No 
 
CLINICAL INFORMATION Patient Vitals for the past 12 hrs: 
 Temp Pulse Resp BP SpO2  
08/02/20 0814 98.3 °F (36.8 °C) 70 16 (!) 143/98 98 % Currently this patient has: 
[] Supplemental O2  
[] IV   
[] PICC [] PORT  
[] NG Tube   
[] PEG Tube  
[x] Ostomy    
[] Colmenares draining _______ urine 
[] Other SIGNS/PHYSICAL FINDINGS Skin: 
[] Warm, dry, supple, intact and color normal for race [x] Warm  
[x] Dry  
[] Cool    
[] Clammy      
[] Diaphoretic Turgor 
 [] Normal 
 [x] Decreased Color: 
 [] Pink [x] Pale 
 [] Cyanotic 
 [] Erythema 
 [] Jaundice [] Normal for Race 
[]  Wounds: 
 
Neuro: 
[x] Lethargy 
[] Restlessness / agitation 
[] Confusion / delirium 
[] Hallucinations 
[] Responds to maximal stimulation 
[] Unresponsive 
[] Seizures Cardiac: 
[] Dyspnea on Exertion 
[] JVD [] Murmur 
[] Palpitations [] Hypotension 
[x] Hypertension 
[] Tachycardia [] Bradycardia 
[] Irregular HR 
[] Pulses Decreased 
[] Pulses Absent 
[] Edema:     
[] Mottling:     
 
Respiratory: 
Breath sounds:  
 [x] Diminished 
 [] Wheeze 
 [] Rhonchi 
 [] Rales [x] Even and unlabored 
[] Labored:           
[] Cough 
 [] Non Productive 
 [] Productive 
  [] Description:          
[] Deep suctioned  
[] O2 at ___ LPM 
[] High flow oxygen greater than 10 LPM 
[] Bi-Pap GI: 
[x] Abdomen soft 
[] Ascites 
[] Nausea 
[] Vomiting 
[] Incontinent of bowels [x] Bowel sounds yes [] Diarrhea 
[] Constipation (see above including last bowel movement) [] Checked for impaction 
[] Last BM-ostomy Nutrition Diet:regular Appetite:  
[] Good  
[x] Fair  
[] Poor  
[] Tube Feeding : 
[x] Voiding 
[] Incontinent  
[] Colmenares Musculoskeletal 
[] Balance/[place] Unsteady [x] Weak Strength:  
 [] Normal  
 [] Limited [x] Decreasing Activities:  
 [] Up as tolerated 
 [x] Bedridden  
 [] Specify: 
 
SAFETY [] 24 hr. Caregiver [x] Side rails ? [x] Hospital bed  
[] Reviewed Falls & Safety ALLERGIES AND MEDICATIONS Allergies: No Known Allergies Current Facility-Administered Medications Medication Dose Route Frequency  haloperidol (HALDOL) 2 mg/mL oral solution 1 mg  1 mg SubLINGual Q6H PRN  
 ondansetron (ZOFRAN ODT) tablet 4 mg  4 mg Oral Q6H PRN  
 LORazepam (INTENSOL) 2 mg/mL oral concentrate 0.5 mg  0.5 mg SubLINGual Q4H PRN  
 acetaminophen (TYLENOL) tablet 650 mg  650 mg Oral Q4H PRN  
 hyoscyamine SL (LEVSIN/SL) tablet 0.125 mg  0.125 mg SubLINGual Q4H PRN  
 senna-docusate (PERICOLACE) 8.6-50 mg per tablet 2 Tab  2 Tab Oral BID PRN  
 oxyCODONE IR (ROXICODONE) tablet 20 mg  20 mg Oral Q4H PRN  
 labetaloL (NORMODYNE) tablet 100 mg  100 mg Oral Q12H  pantoprazole (PROTONIX) tablet 40 mg  40 mg Oral DAILY  potassium chloride SR (KLOR-CON 10) tablet 10 mEq  10 mEq Oral BID  QUEtiapine (SEROquel) tablet 25 mg  25 mg Oral QHS  prochlorperazine (COMPAZINE) tablet 10 mg  10 mg Oral Q6H PRN  
 sodium bicarbonate tablet 650 mg  650 mg Oral BID Visit Time In: 9784 Visit Time Out: 5119

## 2020-08-02 NOTE — PROGRESS NOTES
Problem: Falls - Risk of 
Goal: *Absence of Falls Description: Document Nelly Caputo Fall Risk and appropriate interventions in the flowsheet. Outcome: Progressing Towards Goal 
Note: Fall Risk Interventions: 
Mobility Interventions: Bed/chair exit alarm Medication Interventions: Evaluate medications/consider consulting pharmacy Elimination Interventions: Bed/chair exit alarm

## 2020-08-02 NOTE — ROUTINE PROCESS
Bedside shift change report given to Red Vincent (oncoming nurse) by Azael Ceballos (offgoing nurse). Report included the following information SBAR, Kardex, Procedure Summary, Intake/Output, MAR, Accordion, Recent Results and Med Rec Status.

## 2020-08-03 NOTE — PROGRESS NOTES
Bedside and Verbal shift change report given to Jeanette Dawson RN (oncoming nurse) by Zaynab Camara RN (offgoing nurse). Report included the following information SBAR, Kardex and ED Summary.

## 2020-08-03 NOTE — HOSPICE
[facility] in [facility] 
[phone] Respite Nursing Note Patient Name: Kacy Gunter YOB: 1940 Age: 67 y.o. Date of Visit: 08/03/20 Facility of Care: [facility] Patient Room: [facility]/ Hospice Attending: Edmund Guzmán MD 
Hospice Diagnosis: Malignant neoplasm of rectum ([address] Lorean Hy Level of Care: Respite ASSESSMENT & PLAN 1. Patient admitted to Respite level of care due to:  
 [x] Caregiver Exhaustion: Patient requring increased care needs, family very tired and needing a break.  
 [] Caregiver Breakdown: 2. Continue PO medications, adjust if needed Nursing Interventions: Nurses to help with ADL's of patient, nursing to perform assessments on patient, contact hospice with any acute changes in patient condition. Spiritual Interventions:  to see patient as needed. Psych/ Social/ Emotional Interventions: Home MSW following Care Coordination Needs: Communicate with home team/hospital team/family Care Plan and New Orders Discussed / Approved with Dr. Fred Nunez MD. Description History and Chart Review List number of doses of PRN medications in last 24 hours: 
Medication 1: 
Number of doses: 
 
Medication 2:  
Number of doses: 
 
Medication 3:  
Number of doses: 
 
DISCHARGE PLANNING 1. Discharge Plan: home when respite stay ends 2. Patient/Family teaching: no family present, patient sleeping 3. Response to patient/family teaching: n/a ASSESSMENT   
KARNOFSKY: 30 
 
FAST: N/A Prognosis estimated based on 08/03/20 clinical assessment is:  
[] Few to Many Hours [] Hours to Days  
[] Few to Many Days  
[] Days to Weeks  
[] Few to Many Weeks [x] Weeks to Months  
[] Few to Many Months Quality Measure: Patient self-reports:  [x]  Yes   []  No 
 
ESAS:  
Time of Assessment: 2346 Pain (1-10) 4 Fatigue (1-10): 7 Shortness of breath (1-10):0 Nausea (1-10): 0 Appetite (1-10):  3 Anxiety: (1-10): 2 Depression: (1-10): 0 Well-being: (1-10): 7 Constipation: No 
 
CLINICAL INFORMATION Patient Vitals for the past 12 hrs: 
 Temp Pulse Resp BP SpO2  
08/03/20 0755 98.5 °F (36.9 °C) 65 18 136/80 98 % Currently this patient has: 
[] Supplemental O2  
[] IV   
[] PICC [] PORT  
[] NG Tube   
[] PEG Tube  
[x] Ostomy    
[] Colmenares draining _______ urine 
[] Other SIGNS/PHYSICAL FINDINGS Skin: 
[] Warm, dry, supple, intact and color normal for race [x] Warm  
[x] Dry  
[] Cool    
[] Clammy      
[] Diaphoretic Turgor 
 [] Normal 
 [x] Decreased Color: 
 [] Pink [x] Pale 
 [] Cyanotic 
 [] Erythema 
 [] Jaundice [] Normal for Race 
[]  Wounds: 
 
Neuro: 
[x] Lethargy 
[] Restlessness / agitation 
[] Confusion / delirium 
[] Hallucinations 
[] Responds to maximal stimulation 
[] Unresponsive 
[] Seizures Cardiac: 
[] Dyspnea on Exertion 
[] JVD [] Murmur 
[] Palpitations [] Hypotension 
[x] Hypertension 
[] Tachycardia [] Bradycardia 
[] Irregular HR 
[] Pulses Decreased 
[] Pulses Absent 
[] Edema:     
[] Mottling:     
 
Respiratory: 
Breath sounds:  
 [x] Diminished 
 [] Wheeze 
 [] Rhonchi 
 [] Rales [x] Even and unlabored 
[] Labored:           
[] Cough 
 [] Non Productive 
 [] Productive 
  [] Description:          
[] Deep suctioned  
[] O2 at ___ LPM 
[] High flow oxygen greater than 10 LPM 
[] Bi-Pap GI: 
[x] Abdomen soft 
[] Ascites 
[] Nausea 
[] Vomiting 
[] Incontinent of bowels [x] Bowel sounds yes [] Diarrhea 
[] Constipation (see above including last bowel movement) [] Checked for impaction 
[] Last BM-ostomy Nutrition Diet:regular Appetite:  
[] Good  
[x] Fair  
[] Poor  
[] Tube Feeding : 
[x] Voiding 
[] Incontinent  
[] Colmenares Musculoskeletal 
[] Balance/[place] Unsteady [x] Weak Strength:  
 [] Normal  
 [] Limited [x] Decreasing Activities:  
 [] Up as tolerated 
 [x] Bedridden  
 [] Specify: 
 
SAFETY [] 24 hr. Caregiver [x] Side rails ? [x] Hospital bed  
[] Reviewed Falls & Safety ALLERGIES AND MEDICATIONS Allergies: No Known Allergies Current Facility-Administered Medications Medication Dose Route Frequency  haloperidol (HALDOL) 2 mg/mL oral solution 1 mg  1 mg SubLINGual Q6H PRN  
 ondansetron (ZOFRAN ODT) tablet 4 mg  4 mg Oral Q6H PRN  
 LORazepam (INTENSOL) 2 mg/mL oral concentrate 0.5 mg  0.5 mg SubLINGual Q4H PRN  
 acetaminophen (TYLENOL) tablet 650 mg  650 mg Oral Q4H PRN  
 hyoscyamine SL (LEVSIN/SL) tablet 0.125 mg  0.125 mg SubLINGual Q4H PRN  
 senna-docusate (PERICOLACE) 8.6-50 mg per tablet 2 Tab  2 Tab Oral BID PRN  
 oxyCODONE IR (ROXICODONE) tablet 20 mg  20 mg Oral Q4H PRN  
 labetaloL (NORMODYNE) tablet 100 mg  100 mg Oral Q12H  pantoprazole (PROTONIX) tablet 40 mg  40 mg Oral DAILY  potassium chloride SR (KLOR-CON 10) tablet 10 mEq  10 mEq Oral BID  QUEtiapine (SEROquel) tablet 25 mg  25 mg Oral QHS  prochlorperazine (COMPAZINE) tablet 10 mg  10 mg Oral Q6H PRN  
 sodium bicarbonate tablet 650 mg  650 mg Oral BID Visit Time In: 0900 Visit Time Out: 3042

## 2020-08-03 NOTE — HOSPICE
This morning I met with Mr. Shanique Barber who's a home hospice patient receiving respite care at [facility]. Mr. Shanique Barber was alert and oriented. Moreover, Mr. Shanique Barber was able to engage in a guided life review. He chose to focus on recent losses in his life including the death of his spouse two years ago. He also mentioned the deaths of friends. Mr. Shanique Barber michele by believing in taking each day at a time and hoping for better days. Mr. Shanique Barber described himself as not being \"very Mormon\" at this time. He had been involved in a local Jain but chose to distant himself from the issues that often hinder a Pentecostalism. Mr. Shanique Barber appeared non-anxious and able to articulate abstract thoughts and beliefs. Mr. Shanique Barber was appreciative to have an opportunity to be heard and validated. The plan to respond to Mr. Sibleyon's request for spiritual support both at home and while at the hospital.

## 2020-08-03 NOTE — ROUTINE PROCESS
Bedside shift change report given to Felipe Bagley RN (oncoming nurse) by Isabel Russo RN (offgoing nurse). Report included the following information SBAR, Kardex and MAR.

## 2020-08-04 NOTE — HOSPICE
[facility] Good Help to Those in Need 
[phone] Respite Nursing Note Patient Name: Bhavna Fuchs YOB: 1940 Age: 67 y.o. Date of Visit: 08/04/20 Facility of Care: [facility] Patient Room: [facility]/ Hospice Attending: Dellie Cooks, MD 
Hospice Diagnosis: Malignant neoplasm of rectum ([address] Altaf Spotted Level of Care: Respite ASSESSMENT & PLAN 1. Patient admitted to Respite level of care due to:  
 [] Caregiver Exhaustion:  
 [x] Caregiver Breakdown: family needed a break 2. Patient seemed confused this morning, stated that \"he is not ready for hospice\" 3. Appetite is minimal, no complaints of pain, on room air, drowsy Nursing Interventions: none at this time Spiritual Interventions: was visited by christian Green yesterday Psych/ Social/ Emotional Interventions: None at this time, spent time speaking with patient, he was tired Care Coordination Needs: Contact hospice social worker Rell Mena for discharge information. Care Plan and New Orders Discussed / Approved with Dr. Jyothi Montesinos MD. Description History and Chart Review List number of doses of PRN medications in last 24 hours: 
Medication 1: Arin Banter Number of doses: 1 Medication 2: oxycodone Number of doses: 1 Medication 3:  
Number of doses: 
 
DISCHARGE PLANNING 1. Discharge Plan: will return home when respite ends 2. Patient/Family teaching: discussed care with patient 3. Response to patient/family teaching: seems confused, stated that he was not ready for hospice but wanted to go home ASSESSMENT   
KARNOFSKY: 40 FAST:  
 
Prognosis estimated based on 08/04/20 clinical assessment is:  
[] Few to Many Hours [] Hours to Days  
[] Few to Many Days [x] Days to Weeks  
[] Few to Many Weeks  
[] Weeks to Months  
[] Few to Many Months Quality Measure: Patient self-reports:  [x]  Yes   []  No 
 
ESAS:  
Time of Assessment: 1030 Pain (1-10):6 Fatigue (1-10): 6 Shortness of breath (1-10):5 Nausea (1-10): 6 Appetite (1-10): 3 Anxiety: (1-10): 3 Depression: (1-10): 5 Well-being: (1-10): 5 Constipation: _ Yes  x_ No 
 
CLINICAL INFORMATION Patient Vitals for the past 12 hrs: 
 Temp Pulse Resp BP SpO2  
08/04/20 0750 98.1 °F (36.7 °C) 64 18 140/89 98 % Currently this patient has: 
[] Supplemental O2  
[] IV   
[] PICC [] PORT  
[] NG Tube   
[] PEG Tube  
[x] Ostomy    
[] Colmenares draining _______ urine 
[] Other SIGNS/PHYSICAL FINDINGS Skin: 
[] Warm, dry, supple, intact and color normal for race [x] Warm  
[x] Dry  
[] Cool    
[] Clammy      
[] Diaphoretic Turgor 
 [] Normal 
 [x] Decreased Color: 
 [] Pink [x] Pale 
 [] Cyanotic 
 [] Erythema 
 [] Jaundice [] Normal for Race 
[]  Wounds: 
 
Neuro: 
[] Lethargy 
[] Restlessness / agitation 
[] Confusion / delirium 
[] Hallucinations 
[] Responds to maximal stimulation 
[] Unresponsive 
[] Seizures Cardiac: 
[] Dyspnea on Exertion 
[] JVD [] Murmur 
[] Palpitations [] Hypotension 
[x] Hypertension 
[] Tachycardia [] Bradycardia 
[] Irregular HR 
[] Pulses Decreased 
[] Pulses Absent 
[] Edema:       (Location, Grade and Pitting) [] Mottling:      (Location) Respiratory: 
Breath sounds:  
 [x] Diminished 
 [] Wheeze 
 [] Rhonchi 
 [] Rales [x] Even and unlabored 
[] Labored:           
[] Cough 
 [] Non Productive 
 [] Productive 
  [] Description:          
[] Deep suctioned  
[] O2 at ___ LPM 
[] High flow oxygen greater than 10 LPM 
[] Bi-Pap GI: 
[x] Abdomen (describe) ostomy 
[] Ascites [x] Nausea 
[] Vomiting 
[] Incontinent of bowels [x] Bowel sounds (yes/no) [] Diarrhea 
[] Constipation (see above including last bowel movement) [] Checked for impaction 
[x] Last BM ostomy Nutrition Diet:__regular Appetite:  
[] Good  
[x] Fair  
[] Poor  
[] Tube Feeding : 
[] Voiding 
[x] Incontinent  
[] Colmenares Musculoskeletal 
[] Balance/[place] Unsteady [x] Weak Strength:  
 [] Normal  
 [x] Limited  
 [] Decreasing Activities:  
 [x] Up as tolerated 
 [] Bedridden  
 [] Specify: 
 
SAFETY [] 24 hr. Caregiver [x] Side rails ? [x] Hospital bed  
[] Reviewed Falls & Safety ALLERGIES AND MEDICATIONS Allergies: No Known Allergies Current Facility-Administered Medications Medication Dose Route Frequency  haloperidol (HALDOL) 2 mg/mL oral solution 1 mg  1 mg SubLINGual Q6H PRN  
 ondansetron (ZOFRAN ODT) tablet 4 mg  4 mg Oral Q6H PRN  
 LORazepam (INTENSOL) 2 mg/mL oral concentrate 0.5 mg  0.5 mg SubLINGual Q4H PRN  
 acetaminophen (TYLENOL) tablet 650 mg  650 mg Oral Q4H PRN  
 hyoscyamine SL (LEVSIN/SL) tablet 0.125 mg  0.125 mg SubLINGual Q4H PRN  
 senna-docusate (PERICOLACE) 8.6-50 mg per tablet 2 Tab  2 Tab Oral BID PRN  
 oxyCODONE IR (ROXICODONE) tablet 20 mg  20 mg Oral Q4H PRN  
 labetaloL (NORMODYNE) tablet 100 mg  100 mg Oral Q12H  pantoprazole (PROTONIX) tablet 40 mg  40 mg Oral DAILY  potassium chloride SR (KLOR-CON 10) tablet 10 mEq  10 mEq Oral BID  QUEtiapine (SEROquel) tablet 25 mg  25 mg Oral QHS  prochlorperazine (COMPAZINE) tablet 10 mg  10 mg Oral Q6H PRN  
 sodium bicarbonate tablet 650 mg  650 mg Oral BID Visit Time In: 1000 Visit Time Out: 1843

## 2020-08-04 NOTE — PROGRESS NOTES
Bedside and Verbal shift change report given to Krupa (oncoming nurse) by Felipe Bagley (offgoing nurse). Report included the following information SBAR, Kardex and MAR.

## 2020-08-04 NOTE — PROGRESS NOTES
Bedside and Verbal shift change report given to  Madhu Lau Rn (oncoming nurse) by  Concepcion Raygoza (offgoing nurse). Report included the following information SBAR, Kardex, Intake/Output, MAR, Accordion, Recent Results and Med Rec Status.

## 2020-08-05 NOTE — DISCHARGE SUMMARY
Hospice Discharge Summary [facility] Good Help to Those in Need Date of Admission: 7/31/2020 Date of Discharge: 8/5/20 Natasha Mars is a 67y.o. year old who was admitted to [facility] at [facility] with a Hospice diagnosis of Malignant neoplasm of rectum ([facility] [address] Casandra Clements. The patient was discharged to home for ongoing Hospice care. Patient was admitted for respite care. No issues during the hospitalization. Of note, patient was only requiring oxycodone 20 mg-one dose daily. No other concerns or issues.  Mariann-hospice liaison has talked with the home hospice team and discussed his opioids needs in the hospital.

## 2020-08-05 NOTE — PROGRESS NOTES
Bedside and Verbal shift change report given to Rick Handley (oncoming nurse) by Noe Bosworth (offgoing nurse). Report included the following information SBAR, Kardex, Procedure Summary, Intake/Output, MAR, Recent Results and Med Rec Status.

## 2020-08-15 NOTE — PROGRESS NOTES
Primary Nurse Yuki Kramer and [facility] LPN performed a dual skin assessment on this patient Impairment noted- Right Foot (hard, scaly callous noted on ball of foot), Matt Shins healing scratches noted, under left knee, small circular area not open, inside gluteal cleft, red irritation, no open wounds, zinc paste applied  Baltazar score is 12

## 2020-08-15 NOTE — HOSPICE
[facility]  [phone]    Respite Nursing Note   Patient Name: Candis Guerrero  YOB: 1940  Age: 67 y.o. Date of Visit: 08/15/20  Facility of Care: [facility]  Patient Room: 603/     Hospice Attending: Vincent Li MD  Hospice Diagnosis: Rectal cancer ([address] [C20]    Level of Care: Respite    ASSESSMENT & PLAN     1. Patient admitted to Respite level of care due to:    [x] Caregiver Exhaustion: Step daughter, Naa Coronado reports that patient's condition is deteriorating and she doesn't feel that she is providing the level of care that the patient needs. She is exhausted. She would like to work with the hospice MSW on options for supplemental caregivers once the patient goes home. Nursing Interventions: Patient arrived wearing 2 briefs that were soaked with urine. Unable to place lopez catheter and condom catheter was placed. Spiritual Interventions: None at this time. Psych/ Social/ Emotional Interventions: Caregiver reports that she is not able to work at this time due to her caregiving responsibilities and the patient's income is contributing to rent and living expenses. The patient's biological children are en route from [place] and she will talk to them about possible support. Care Coordination Needs: Work with nursing unit to monitor patient for changes in pain levels that may warrant change in LOC. Care Plan and New Orders Discussed / Approved with Remy MAHONEY. Description History and Chart Review     List number of doses of PRN medications in last 24 hours:  Medication 1:  Number of doses:    Medication 2:   Number of doses:    Medication 3:   Number of doses:    DISCHARGE PLANNING     1. Discharge Plan: Patient is scheduled for discharge from respite care on 8/20/20 around 11 am.   2. Patient/Family teaching: EOL symptoms, medications, Hospice LOC  3.  Response to patient/family teaching: Receptive    ASSESSMENT KARNOFSKY: 20-30    FAST: N/A     Prognosis estimated based on 08/15/20 clinical assessment is:   [] Few to Many Hours  [] Hours to Days   [] Few to Many Days   [x] Days to Weeks   [] Few to Many Weeks   [] Weeks to Months   [] Few to Many Months    Quality Measure: Patient self-reports:  []  Yes   [x]  No        CLINICAL INFORMATION     Patient Vitals for the past 12 hrs:   Temp Pulse Resp BP SpO2   08/15/20 0946 98.6 °F (37 °C) 66 15 (!) 121/91 93 %       Currently this patient has:  [] Supplemental O2   [] IV    [] PICC      [] PORT   [] NG Tube    [x] Ostomy  : Left abdomen   [x] Condom Catheter     SIGNS/PHYSICAL FINDINGS     Skin:  [] Warm, dry, supple, intact and color normal for race  [] Warm   [] Dry   [x] Cool     [] Clammy       [] Diaphoretic    Turgor   [] Normal   [] Decreased  Color:   [] Pink   [] Pale   [] Cyanotic   [] Erythema   [] Jaundice   [x] Normal for Race  []  Wounds:     Neuro:  [x] Lethargy  [] Restlessness / agitation  [] Confusion / delirium  [] Hallucinations  [x] Responds to maximal stimulation  [] Unresponsive  [] Seizures     Cardiac:  [] Dyspnea on Exertion  [] JVD  [] Murmur  [] Palpitations  [] Hypotension  [] Hypertension  [] Tachycardia  [] Bradycardia  [] Irregular HR  [] Pulses Decreased  [] Pulses Absent  [] Edema:       (Location, Grade and Pitting)  [] Mottling:      (Location)    Respiratory:  Breath sounds:    [] Diminished   [] Wheeze   [] Rhonchi   [] Rales   [x] Even and unlabored  [] Labored:            [] Cough   [] Non Productive   [] Productive    [] Description:           [] Deep suctioned   [] O2 at ___ LPM  [] High flow oxygen greater than 10 LPM  [] Bi-Pap    GI:  [] Abdomen (describe)   [] Ascites  [] Nausea  [] Vomiting  [] Incontinent of bowels  [x] Bowel sounds: yes  [] Diarrhea  [] Constipation (see above including last bowel movement)  [] Checked for impaction  [] Last BM : unknown      Nutrition  Diet:__________  Appetite:   [] Good   [x] Fair   [] Poor   [] Tube Feeding     :  [] Voiding  [] Incontinent   [] Colmenares    Musculoskeletal  [] Balance/[place] Unsteady   [] Weak   Strength:    [] Normal    [] Limited    [x] Decreasing   Activities:    [] Up as tolerated   [x] Bedridden    [] Specify:    SAFETY  [x] 24 hr. Caregiver   [x] Side rails ?     [x] Hospital bed   [x] Reviewed Falls & Safety     ALLERGIES AND MEDICATIONS     Allergies: No Known Allergies    Current Facility-Administered Medications   Medication Dose Route Frequency    LORazepam (INTENSOL) 2 mg/mL oral concentrate 0.5 mg  0.5 mg Oral Q3H PRN    oxyCODONE ER (OxyCONTIN) tablet 40 mg  40 mg Oral Q12H    oxyCODONE IR (ROXICODONE) tablet 20 mg  20 mg Oral Q3H PRN    QUEtiapine (SEROquel) tablet 25 mg  25 mg Oral QHS    prochlorperazine (COMPAZINE) tablet 10 mg  10 mg Oral Q6H PRN    acetaminophen (TYLENOL) tablet 650 mg  650 mg Oral Q4H PRN    hyoscyamine SL (LEVSIN/SL) tablet 0.125 mg  0.125 mg SubLINGual Q4H PRN    senna-docusate (PERICOLACE) 8.6-50 mg per tablet 2 Tab  2 Tab Oral BID PRN    labetaloL (NORMODYNE) tablet 100 mg  100 mg Oral BID          Visit Time In: 9:45  Visit Time Out: 10:15

## 2020-08-16 NOTE — PROGRESS NOTES
Problem: Falls - Risk of  Goal: *Absence of Falls  Description: Document Devere Lincoln Fall Risk and appropriate interventions in the flowsheet.   Outcome: Progressing Towards Goal  Note: Fall Risk Interventions:       Mentation Interventions: Door open when patient unattended    Medication Interventions: Evaluate medications/consider consulting pharmacy    Elimination Interventions: Call light in reach, Toileting schedule/hourly rounds

## 2020-08-16 NOTE — HOSPICE
[facility]  [phone]    [facility] Daily Nursing Note   Patient Name: Geovany Ang  YOB: 1940  Age: 67 y.o. Date of Visit: 08/16/20  Facility of Care: [facility]  Patient Room: 603/01     Hospice Attending: Brenda Guevara MD  Hospice Diagnosis: Rectal cancer [facility]) [C20]    Level of Care: GIP    Current GIP Symptoms    1. Pain  2. Agitation  3. Secretions  4. Decreased     ASSESSMENT & PLAN     1. Pain: no s/s of pain. Pt has received oxycodone PO prior to this assessment. Pt can no longer tolerate PO medications. Scheduled morphine IV 2mg q4hrs with prn available  2. Agitation: no s/s of agitation. Pt has received PO medications prior to assessment but can no longer tolerate PO medications. IV lorazepam and IV haldol available prn  3. Secretions: LS crackles/BL. PRN robinul available  4. Decreased: pt is minimally responsive and requires frequent assessments by skilled medical staff for non-verbal cues of distress. Spiritual Interventions: Hospital and Hospice Chaplain available 24/7    Psych/ Social/ Emotional Interventions: Tosheika/step-daughter and her daughter was present. Family is managing well. Hue June shared that she lost her mother 2 years ago and does not want to see him suffer. She does not plan to visit now that he is in his \"last days\". She wishes to remember \"the way he was\" prior to this admission to [facility]. Care Coordination Needs: communication with hospice team and staff at [place] [address] and New Orders discussed / approved with Linda Mckeon NP. Description History and Chart Review     Narrative History of last 24 hours that demonstrates care cannot be provided in another setting:    Pt requiring frequent assessments by skilled medical staff with administration of IV medications along with adjustments in medication dosage. This care cannot be provided outside the inpatient setting.       What has been done to control the patient's symptoms in the last 24 hours? Attempts at PO medications prior to change in LOC from Respite to GIP. IV medications started r/t patient not tolerating PO medications    Does the patient currently require IV medications? Yes  Does the patient currently require scheduled medications? Yes  Does the patient currently require a PCA? No    List number of doses of PRN medications in last 24 hours:  Medication 1:  Number of doses:    Medication 2:   Number of doses:    Medication 3:   Number of doses:    Supporting documentation for GIP need for pain control:  [x] Frequent evaluation by a doctor, nurse practitioner, nurse   [x] Frequent medication adjustment    [x] IVs that cannot be administered at home   [x] Aggressive pain management   [] Complicated technical delivery of medications              Supporting documentation for GIP need for symptom control:  [x]  Sudden decline necessitating intensive nursing intervention  []  Uncontrolled / intractable nausea or vomiting   []  Pathological fractures  []  Advanced open wounds requiring frequent skilled care  [x] Unmanageable respiratory distress  [] New or worsening delirium   [] Delirium with behavior issues: Is 24 hour caregiver present due to safety concerns with agitation? (yes/no)  [] Imminent death - with skilled nursing needs documented above    DISCHARGE PLANNING     1. Discharge Plan: Pt will more than likely  at [facility] but if stabilizes, family will take pt home. 2. Patient/Family teaching: EOL processes to family at bedside  3.  Response to patient/family teaching: verbalized understanding    ASSESSMENT    KARNOFSKY: 20%    Prognosis estimated based on 20 clinical assessment is:   [] Few to Many Hours  [x] Hours to Days   [] Few to Many Days   [] Days to Weeks   [] Few to Many Weeks   [] Weeks to Months   [] Few to Many Months    Quality Measure: Patient self-reports:  [] Yes    [x] No    ESAS:   Time of Assessment: 1730  Pain (1-10): 0  Fatigue (1-10): 10  Shortness of breath (1-10): 4  Nausea (1-10): 0  Appetite (1-10): 0  Anxiety: (1-10): 0  Depression: (1-10): 0  Well-being: (1-10): 5  Constipation:   LAST BM: unknown    CLINICAL INFORMATION     Patient Vitals for the past 12 hrs:   Temp Pulse Resp BP SpO2   08/16/20 0804 98.6 °F (37 °C) 71 16 127/65 97 %       Currently this patient has:  [] Supplemental O2   [x] IV    [] PICC      [] PORT   [] NG Tube    [] PEG Tube   [] Ostomy     [] Colmenares draining _______ urine  [] Other:     SIGNS/PHYSICAL FINDINGS     Skin (including wound):  [] Warm, dry, supple, intact and color normal for race  [x] Warm   [] Dry   [] Cool     [] Clammy       [] Diaphoretic    Turgor   [] Normal   [x] Decreased  Color:   [] Pink   [] Pale   [] Cyanotic   [] Erythema   [] Jaundice   [x] Normal for Race  []  Wounds:    Neuro:  [] Lethargy  [] Restlessness / agitation  [] Confusion / delirium  [] Hallucinations  [] Responds to maximal stimulation  [x] decreased responsiveness  [] Seizures     Cardiac:  [] Dyspnea on Exertion  [] JVD  [] Murmur  [] Palpitations  [] Hypotension  [] Hypertension  [] Tachycardia  [] Bradycardia  [] Irregular HR  [] Pulses Decreased  [] Pulses Absent  [] Edema:         [] Mottling:          Respiratory:  Breath sounds:    [] Diminished   [] Wheeze   [x] Crackles   [] Rales   [x] Even and unlabored  [] Labored:            [] Cough   [] Non Productive   [] Productive    [] Description:           [] Deep suctioned   [] O2 at ___ LPM  [] High flow oxygen greater than 10 LPM  [] Bi-Pap    GI  [x] Abdomen (soft)   [] Ascites  [] Nausea  [] Vomiting  [x] Incontinent of bowels  [] Bowel sounds (yes)  [] Diarrhea  [] Constipation (see above including last bowel movement)  [] Checked for impaction  [x] Last BM unknown    Nutrition  Diet:_NPO____  Appetite:   [] Good   [] Fair   [] Poor   [] Tube Feeding       [] Voiding  [x] Incontinent   [] Colmenares    Musculoskeletal  [] Balance/[place] Unsteady   [x] Weak   Strength:    [] Normal    [] Limited    [x] Decreasing   Activities:    [] Up as tolerated   [x] Bedridden    [] Specify:    SAFETY  [x] 24 hr. Caregiver   [x] Side rails ?     [x] Hospital bed   [x] Reviewed Falls & Safety       ALLERGIES AND MEDICATIONS     Allergies: No Known Allergies    Current Facility-Administered Medications   Medication Dose Route Frequency    bisacodyL (DULCOLAX) suppository 10 mg  10 mg Rectal DAILY PRN    acetaminophen (TYLENOL) suppository 650 mg  650 mg Rectal Q4H PRN    morphine injection 2 mg  2 mg IntraVENous Q4H    haloperidol lactate (HALDOL) injection 2 mg  2 mg IntraVENous Q6H PRN    glycopyrrolate (ROBINUL) injection 0.2 mg  0.2 mg IntraVENous Q4H PRN    LORazepam (ATIVAN) injection 0.5 mg  0.5 mg IntraVENous Q30MIN PRN    morphine injection 2 mg  2 mg IntraVENous Q30MIN PRN    sodium chloride (NS) flush 5-10 mL  5-10 mL IntraVENous PRN          Visit Time In: 0515  Visit Time Out: 0530

## 2020-08-16 NOTE — HOSPICE
Hospice Liaison Update:     Family members are currently bedside. Discussion over patients general status and comfort. Daughter states that she feels patient is still uncomfortable, and not \"settled\". Dose of SL medications have been provided, with fair results. Pt reviewed with Linda Mckeon NP, and plan to change hospice level of care from Respite stay to [facility] LOC for symptoms of pain, agitation and secretions. Plan to have peripheral IV established, and will order symptom medications IV. Family bedside in agreement with this hospice plan of care.     Betsy Mcdonald RN, [place] [address] Nurse Liaison  [phone] Mobile  [phone] Office

## 2020-08-16 NOTE — PROGRESS NOTES
Problem: Pain  Goal: Verbalize satisfaction of level of comfort and symptom control  Note: No s/s of pain at assessment. Prior to assessment, pt had been on scheduled oxycodone/PO 20mg q4hrs. Pt can no longer tolerate PO medications. IV morphine 2mg q4hrs scheduled with prn available. Problem: Dyspnea Due to End of Life  Goal: Demonstrate understanding of and ability to manage respiratory symptoms at end of life  Note: Prior to assessment, pt was noted to have SOB. IV morphine is scheduled with prn available.

## 2020-08-16 NOTE — HOSPICE
[facility]  [phone]    Respite Nursing Note   Patient Name: Candis Guerrero  YOB: 1940  Age: 67 y.o. Date of Visit: 08/16/20  Facility of Care: [facility]  Patient Room: 603/01     Hospice Attending: Margaret Shah MD  Hospice Diagnosis: Rectal cancer [facility]) [C20]    Level of Care: Respite    ASSESSMENT & PLAN     1. Patient admitted to Respite level of care due to:    [x] Caregiver Exhaustion: Step daughter, Naa Coronado reports that patient's condition is deteriorating and she doesn't feel that she is providing the level of care that the patient needs. She is exhausted. She would like to work with the hospice MSW on options for supplemental caregivers once the patient goes home. 2. Evaluate patient for change in level of care under hospice services. 3. Education of patient and family regarding end of life care and Hospice plan of care  4. Provide education and support to unit staff caring for hospice patient and family. Provide staff with direct contact information to reach hospice team [phone]   5. Provide support and frequent rounds for patient comfort and safety ongoing  6. Provide  support ongoing, continue to discuss discharge plan   7. Change PO medications to SL; pt is now unresponsive. 8. Schedule SL oxydodone  9. Maintain skin integrity as tolerated for hospice patient, turning and repositioning for comfort, and specialty mattress if appropriate      Spiritual Interventions: Hospice Chaplain available ongoing    Psych/ Social/ Emotional Interventions: Continue to update home hospice team during respite stay    Care Coordination Needs: Communicate with unit nursing staff for plan of care updates for hospice patient. Care Plan and New Orders Discussed / Approved with Remy Holland NP.     Description History and Chart Review     List number of doses of PRN medications in last 24 hours:  Medication 1:  Number of doses: Medication 2:   Number of doses:    Medication 3:   Number of doses:    DISCHARGE PLANNING     1.  Discharge Plan: Patient is scheduled for discharge from respite care on 8/20/20 around 11 am.   2. Patient/Family teaching: Family aware of this plan of care, as documented by hospice nurse on 8-      ASSESSMENT    KARNOFSKY: 10      Prognosis estimated based on 08/16/20 clinical assessment is:     [x] Hours to Days       Quality Measure: Patient self-reports:  []  Yes   [x]  No    Adult Non-Verbal Pain Assessment Score: 4/10     Face  [] 0   No particular expression or smile  [x] 1   Occasional grimace, tearing, frowning, wrinkled forehead  [] 2   Frequent grimace, tearing, frowning, wrinkled forehead     Activity (movement)  [] 0   Lying quietly, normal position  [x] 1   Seeking attention through movement or slow, cautious movement  [] 2   Restless, excessive activity and/or withdrawal reflexes     Guarding  [x] 0   Lying quietly, no positioning of hands over areas of body  [] 1   Splinting areas of the body, tense  [] 2   Rigid, stiff     Physiology (vital signs)  [x] 0   Stable vital signs  [] 1   Change in any of the following: SBP > 20mm Hg; HR > 20/minute  [] 2   Change in any of the following: SBP > 30mm Hg; HR > 25/minute     Respiratory  [] 0   Baseline RR/SpO2, compliant with ventilator  [x] 1   RR > 10 above baseline, or 5% drop SpO2, mild asynchrony with ventilator  [] 2   RR > 20 above baseline, or 10% drop SpO2, asynchrony with ventilator      CLINICAL INFORMATION     Patient Vitals for the past 12 hrs:   Temp Pulse Resp BP SpO2   08/16/20 0804 98.6 °F (37 °C) 71 16 127/65 97 %       Currently this patient has:  [] Supplemental O2   [] IV    [] PICC      [] PORT   [] NG Tube    [] PEG Tube   [] Ostomy     [] Colmenares   [] Other    SIGNS/PHYSICAL FINDINGS     Skin:  [x] Warm, dry, supple, intact and color normal for race  [] Warm   [] Dry   [] Cool     [] Clammy       [] Diaphoretic Turgor   [] Normal   [x] Decreased  Color:   [] Pink   [x] Pale   [] Cyanotic   [] Erythema   [] Jaundice   [] Normal for Race  []  Wounds:    Neuro:  [x] Lethargy  [] Restlessness / agitation  [] Confusion / delirium  [] Hallucinations  [] Responds to maximal stimulation  [x] Unresponsive  [] Seizures     Cardiac:  [] Dyspnea on Exertion  [] JVD  [] Murmur  [] Palpitations  [] Hypotension  [] Hypertension  [] Tachycardia  [] Bradycardia  [] Irregular HR  [x] Pulses Decreased  [] Pulses Absent  [] Edema:       (Location, Grade and Pitting)  [] Mottling:      (Location)    Respiratory:  Breath sounds:    [x] Diminished   [] Wheeze   [] Rhonchi   [] Rales   [x] UnEven   [x] Labored:            [] Cough   [] Non Productive   [] Productive    [] Description:           [] Deep suctioned   [] O2 at ___ LPM  [] High flow oxygen greater than 10 LPM  [] Bi-Pap    GI:  [x] Abdomen soft, non-tender  [] Ascites  [] Nausea  [] Vomiting  [] Incontinent of bowels  [x] Bowel sounds faint  [] Diarrhea  [] Constipation (see above including last bowel movement)  [] Checked for impaction  [x] Last BM prior to respite stay      Nutrition  Diet:__Unresponsive________  Appetite:     :  [] Voiding  [] Incontinent   [x] Colmenares    Musculoskeletal  [] Balance/[place] Unsteady   [x] Weak   Strength:    [] Normal    [] Limited    [x] Decreasing   Activities:    [] Up as tolerated   [x] Bedridden    [] Specify:    SAFETY  [x] 24 hr. Caregiver   [x] Side rails ?     [x] Hospital bed   [x] Reviewed Falls & Safety     ALLERGIES AND MEDICATIONS     Allergies: No Known Allergies    Current Facility-Administered Medications   Medication Dose Route Frequency    haloperidol (HALDOL) 2 mg/mL oral solution 2 mg  2 mg SubLINGual Q6H PRN    bisacodyL (DULCOLAX) suppository 10 mg  10 mg Rectal DAILY PRN    acetaminophen (TYLENOL) suppository 650 mg  650 mg Rectal Q4H PRN    oxyCODONE (ROXICODONE INTENSOL) 20 mg/mL concentrated solution 20 mg  20 mg Oral Q4H    LORazepam (INTENSOL) 2 mg/mL oral concentrate 0.5 mg  0.5 mg Oral Q3H PRN    prochlorperazine (COMPAZINE) tablet 10 mg  10 mg Oral Q6H PRN    hyoscyamine SL (LEVSIN/SL) tablet 0.125 mg  0.125 mg SubLINGual Q4H PRN    oxyCODONE (ROXICODONE INTENSOL) 20 mg/mL concentrated solution 20 mg  20 mg Oral Q3H PRN          Visit Time In: 10:00  Visit Time Out: 12:00  Girma Shea RN, [place] [address] Nurse Liaison  [phone] [place]  [phone] Office

## 2020-08-16 NOTE — H&P
[address] [place] [address] [facility]  [phone]    Patient Name: Candis Guerrero  YOB: 1940    Date of Provider Hospice Visit: 08/16/20    Level of Care:   [x] General Inpatient (GIP)    [] Routine   [] Respite    Current Location of Care:  [x] [facility] [] [facility] [] [facility] [] [facility] [] [facility], patient referred from:  [] [facility] [] [facility] [] [facility] [] [facility] [] Home [] Other:     Date of Original Hospice Admission: 7/23/2020  Hospice Medical Director at time of admission: Samantha Ryan MD    Principle Hospice Diagnosis: Rectal Cancer  Diagnoses RELATED to the terminal prognosis: pain, AM  Other Diagnoses:      HOSPICE SUMMARY   Do not cut and paste chart information other than imaging findings  Candis Guerrero is a 66 y/o male admitted to [facility] on 8/15/2020 for respite care, today is transitioning to [facility]. He has a history of HTN, CKD3, dementia, rectal cancer s/p diverting colostomy 5/5/2020, severe pro-kaleb malnutrition. At home, he has declined, is bed ridden, unable to eat or drink anything. Functionally, the patient's Karnofsky and/or Palliative Performance Scale has declined over a period of weeks and is estimated at 10. The patient is dependent on the following ADLs: all ADLs. Objective information that support this patients limited prognosis includes:   ABD CT 5/6/2020   1. Hyperenhancing rectal lesion, as described above, consistent with neoplasm. Thickening of the colon and multiple small bowel loops. 2. Right upper lobe groundglass attenuation and mild patchy air space. 3. Trace bilateral pleural fluid. 4. Urinary bladder wall thickening and perivesicular inflammatory stranding. Findings may represent cystitis. 5. Anasarca.       The patient/family chose comfort measures with the support of Hospice. HOSPICE DIAGNOSES   Active Symptoms:  1. Pain   2.secretions   3. Altered mental status     PLAN   1.  Transfer status from respite to GIP: pt with sudden decline, no longer tolerating SL medications, requiring IV for scheduled pain medication, frequent evaluation and medication adjustment. Appears to be transitioning. 2. IV access. 3. Morphine 2mg IV q. 4 hours scheduled for pain. 4. Robinul 0.2mg q. 4 hours prn for secretions. 5. Lorazepam 0.5mg q 30 min prn agitation. 6.  and SW to support family needs  7. Disposition: respite or home. 8. Hospice Plan of care was reviewed in detail and agree with current plan of care    Prognosis estimated based on 08/16/20 clinical assessment is:   [x] Hours to Days    [] Days to Weeks    [] Other:    Communicated plan of care with: Hospice Case Manager; Hospice IDT; Care Team     GOALS OF CARE     Patient/Medical POA stated Goal of Care: comfort care    [x] I have reviewed and/or updated ACP information in the Advance Care Planning Navigator. This information is available in the [address] [place] [address] Drive link in the patient's chart header. Primary Decision Maker (Health Care Agent):   Primary Decision Maker: Jacqueline Albrecht (STEP-DTR) - Daughter - [phone]    Resuscitation Status: DNR  If DNR is there a Durable DNR on file? : [] Yes [x] No (If no, complete Durable DNR)    HISTORY     History obtained from: chart, RN    CHIEF COMPLAINT: respite  The patient is:   [] Verbal  [] Nonverbal  [x] Unresponsive    HPI/SUBJECTIVE:  Pt was admitted 8/15 for respite care, on exam today he is unresponsive, moans briefly when stimulated.         REVIEW OF SYSTEMS     The following systems were: [] reviewed  [x] unable to be reviewed    Positive ROS include:  Constitutional: fatigue, weakness, in pain, short of breath  Ears/nose/mouth/throat: increased airway secretions  Respiratory:shortness of breath, wheezing  Gastrointestinal:poor appetite, nausea, vomiting, abdominal pain, constipation, diarrhea  Musculoskeletal:pain, deformities, swelling legs  Neurologic:confusion, hallucinations, weakness  Psychiatric:anxiety, feeling depressed, poor sleep  Endocrine:     Adult Non-Verbal Pain Assessment Score: 1    Face  [] 0   No particular expression or smile  [x] 1   Occasional grimace, tearing, frowning, wrinkled forehead  [] 2   Frequent grimace, tearing, frowning, wrinkled forehead    Activity (movement)  [x] 0   Lying quietly, normal position  [] 1   Seeking attention through movement or slow, cautious movement  [] 2   Restless, excessive activity and/or withdrawal reflexes    Guarding  [x] 0   Lying quietly, no positioning of hands over areas of body  [] 1   Splinting areas of the body, tense  [] 2   Rigid, stiff    Physiology (vital signs)  [x] 0   Stable vital signs  [] 1   Change in any of the following: SBP > 20mm Hg; HR > 20/minute  [] 2   Change in any of the following: SBP > 30mm Hg; HR > 25/minute    Respiratory  [x] 0   Baseline RR/SpO2, compliant with ventilator  [] 1   RR > 10 above baseline, or 5% drop SpO2, mild asynchrony with ventilator  [] 2   RR > 20 above baseline, or 10% drop SpO2, asynchrony with ventilator     FUNCTIONAL ASSESSMENT     Palliative Performance Scale (PPS):  10%       PSYCHOSOCIAL/SPIRITUAL ASSESSMENT     Active Problems:    Rectal cancer ([address] (5/13/2020)      Past Medical History:   Diagnosis Date    History of vascular access device 05/04/2020    [facility] VAT 5Fr Triple PICC Right brachial 43cm for TPN      Past Surgical History:   Procedure Laterality Date    FLEXIBLE SIGMOIDOSCOPY N/A 5/1/2020    SIGMOIDOSCOPY FLEXIBLE performed by Altaf Álvarez MD at [facility] END[facility]      Social History     Tobacco Use    Smoking status: Former Smoker    Smokeless tobacco: Never Used   Substance Use Topics    Alcohol use: Not on file     No family history on file.    No Known Allergies   Current Facility-Administered Medications   Medication Dose Route Frequency    bisacodyL (DULCOLAX) suppository 10 mg  10 mg Rectal DAILY PRN    acetaminophen (TYLENOL) suppository 650 mg  650 mg Rectal Q4H PRN    morphine injection 2 mg 2 mg IntraVENous Q4H    haloperidol lactate (HALDOL) injection 2 mg  2 mg IntraVENous Q6H PRN    glycopyrrolate (ROBINUL) injection 0.2 mg  0.2 mg IntraVENous Q4H PRN    LORazepam (ATIVAN) injection 0.5 mg  0.5 mg IntraVENous Q30MIN PRN    morphine injection 2 mg  2 mg IntraVENous Q30MIN PRN    sodium chloride (NS) flush 5-10 mL  5-10 mL IntraVENous PRN        PHYSICAL EXAM     Wt Readings from Last 3 Encounters:   07/22/20 86.2 kg (190 lb)   07/06/20 86.2 kg (190 lb)   06/03/20 86.2 kg (190 lb)       Visit Vitals  /65 (BP 1 Location: Right arm, BP Patient Position: At rest)   Pulse 71   Temp 98.6 °F (37 °C)   Resp 16   SpO2 97%       Supplemental O2  [] Yes  [] NO  Last bowel movement: *unknown    Currently this patient has:  [] Peripheral IV [] PICC  [] PORT [] ICD    [x] Colmenares Catheter [] NG Tube   [] PEG Tube    [] Rectal Tube [] Drain  [x] Other: ostomy    Constitutional:  obtunded  Eyes: closed  ENMT: moist, no nasal drainage  Cardiovascular: RRR  Respiratory: CTAB, shallow  Gastrointestinal: soft, non-tender  Musculoskeletal: no deformities  Skin: dry  Neurologic:unable to assess due to pt factors, not following command  Psychiatric: unable to assess due to pt factors       Pertinent Lab and or Imaging Tests:  Lab Results   Component Value Date/Time    Sodium 143 07/06/2020 10:57 AM    Potassium 3.2 (L) 07/06/2020 10:57 AM    Chloride 113 (H) 07/06/2020 10:57 AM    CO2 23 07/06/2020 10:57 AM    Anion gap 7 07/06/2020 10:57 AM    Glucose 114 (H) 07/06/2020 10:57 AM    BUN 21 (H) 07/06/2020 10:57 AM    Creatinine 1.64 (H) 07/06/2020 10:57 AM    BUN/Creatinine ratio 13 07/06/2020 10:57 AM    GFR est AA 49 (L) 07/06/2020 10:57 AM    GFR est non-AA 41 (L) 07/06/2020 10:57 AM    Calcium 8.5 07/06/2020 10:57 AM     Lab Results   Component Value Date/Time    Protein, total 6.8 07/06/2020 10:57 AM    Albumin 2.8 (L) 07/06/2020 10:57 AM           Total time:   Counseling / coordination time:   > 50% counseling / coordination?:

## 2020-08-17 NOTE — [facility] NOTES
Patient: Jerri Lira    Date: 08/17/20  Time: 4:32 PM    [facility]  Notes  Initial visit. No family present. Vzm left for daughter. Prayer at bedside. Chaplains to follow up as able/needed. Signed by: Demetris Jamil

## 2020-08-17 NOTE — PROGRESS NOTES
[facility] Help to Those in Need  [phone]    Patient Name: Candis Guerrero  YOB: 1940    Date of Provider Hospice Visit: 08/17/20    Level of Care:   [x] General Inpatient (GIP)    [] Routine   [] Respite    Current Location of Care:  [x] [facility] [] [facility] [] [facility] [] [address] [place] [address] [] Hospice [facility], patient referred from:  [] [facility] [] [facility] [] [facility] [] [address] [place] [address] [] Home [] Other:     Date of Original Hospice Admission: 7/23/2020  Hospice Medical Director at time of admission: Samantha Ryan MD    Principle Hospice Diagnosis: Rectal Cancer  Diagnoses RELATED to the terminal prognosis: pain, AM  Other Diagnoses:      HOSPICE SUMMARY   Do not cut and paste chart information other than imaging findings  Candis Guerrero is a 68 y/o male admitted to [facility] on 8/15/2020 for respite care, today is transitioning to GIP. He has a history of HTN, CKD3, dementia, rectal cancer s/p diverting colostomy 5/5/2020, severe pro-kaleb malnutrition. At home, he has declined, is bed ridden, unable to eat or drink anything. Functionally, the patient's Karnofsky and/or Palliative Performance Scale has declined over a period of weeks and is estimated at 10. The patient is dependent on the following ADLs: all ADLs. Objective information that support this patients limited prognosis includes:   ABD CT 5/6/2020   1. Hyperenhancing rectal lesion, as described above, consistent with neoplasm. Thickening of the colon and multiple small bowel loops. 2. Right upper lobe groundglass attenuation and mild patchy air space. 3. Trace bilateral pleural fluid. 4. Urinary bladder wall thickening and perivesicular inflammatory stranding. Findings may represent cystitis. 5. Anasarca.       The patient/family chose comfort measures with the support of Hospice.      HOSPICE DIAGNOSES   Active Symptoms:  -Generalized Pain   -Secretions   -Altered mental status/decreased responsiveness  -Irregular breathing pattern  -Hospice care     PLAN   1. Transferred LOC from respite to [facility] on 8/16: pt with sudden decline, no longer tolerating SL medications, requiring IV for scheduled pain medication, requires frequent evaluation and medication adjustment. 2. Continue Morphine 2mg IV q. 4 hours scheduled for pain. No prns overnight. 3. Schedule Robinul 0.2mg q. 4 hours scheduled for secretions. 4. Will have lorazepam 0.5mg q 30 min prn agitation. 5. Comfort meds for other sxs  6. Prn bisacodyl available for constipation  7.  and SW to support family needs  8. Disposition: if sxs stabilize will consider return to respite LOC  9. Hospice Plan of care was reviewed in detail and agree with current plan of care    Prognosis estimated based on 08/17/20 clinical assessment is:   [x] Hours to Days--discussed with daughter at bedside that he appears to be transitioning. Reviewed sings of eol such as decreased responsiveness and changes in breathing pattern.    [] Days to Weeks    [] Other:    Communicated plan of care with: Hospice Case Manager; Hospice IDT; Care Team     GOALS OF CARE     Patient/Medical POA stated Goal of Care: comfort care    [x] I have reviewed and/or updated ACP information in the Advance Care Planning Navigator. This information is available in the [facility] [address] link in the patient's chart header. Primary Decision Maker (Health Care Agent):   Primary Decision Maker: Andreas George (STEP-DTR) - Daughter - [phone]    Resuscitation Status: DNR  If DNR is there a Durable DNR on file? : [] Yes [x] No (If no, complete Durable DNR)    HISTORY     History obtained from: chart, RN    CHIEF COMPLAINT: patient not responsive to questions  The patient is:   [] Verbal  [] Nonverbal  [x] Unresponsive    HPI/SUBJECTIVE:  Pt was admitted 8/15 for respite care, on exam today he is unresponsive, moans briefly when stimulated. 8/17: daughter at bedside and is playing [employer] music for patient. His eyes are open.  He does not focus on me or respond to conversation. Breathing is irregular. Daughter believes he is more comfortable today compared to yesterday and is amenable to any changes which will help promote comfort. She asks about prognosis and believes time is short. She thinks he may pass in the next day or two. Says he had previously been praying to go to [place] with his wife who  2 years ago.      REVIEW OF SYSTEMS     The following systems were: [] reviewed  [x] unable to be reviewed as patient is unresponsive to questions    Positive ROS include:  Constitutional: fatigue, weakness, in pain, short of breath  Ears/nose/mouth/throat: increased airway secretions  Respiratory:shortness of breath, wheezing  Gastrointestinal:poor appetite, nausea, vomiting, abdominal pain, constipation, diarrhea  Musculoskeletal:pain, deformities, swelling legs  Neurologic:confusion, hallucinations, weakness  Psychiatric:anxiety, feeling depressed, poor sleep  Endocrine:     Adult Non-Verbal Pain Assessment Score: 0    Face  [x] 0   No particular expression or smile  [] 1   Occasional grimace, tearing, frowning, wrinkled forehead  [] 2   Frequent grimace, tearing, frowning, wrinkled forehead    Activity (movement)  [x] 0   Lying quietly, normal position  [] 1   Seeking attention through movement or slow, cautious movement  [] 2   Restless, excessive activity and/or withdrawal reflexes    Guarding  [x] 0   Lying quietly, no positioning of hands over areas of body  [] 1   Splinting areas of the body, tense  [] 2   Rigid, stiff    Physiology (vital signs)  [x] 0   Stable vital signs  [] 1   Change in any of the following: SBP > 20mm Hg; HR > 20/minute  [] 2   Change in any of the following: SBP > 30mm Hg; HR > 25/minute    Respiratory  [x] 0   Baseline RR/SpO2, compliant with ventilator  [] 1   RR > 10 above baseline, or 5% drop SpO2, mild asynchrony with ventilator  [] 2   RR > 20 above baseline, or 10% drop SpO2, asynchrony with ventilator FUNCTIONAL ASSESSMENT     Palliative Performance Scale (PPS):  10%       PSYCHOSOCIAL/SPIRITUAL ASSESSMENT     Active Problems:    Rectal cancer ([address] (5/13/2020)      Past Medical History:   Diagnosis Date    History of vascular access device 05/04/2020    [facility] VAT 5Fr Triple PICC Right brachial 43cm for TPN      Past Surgical History:   Procedure Laterality Date    FLEXIBLE SIGMOIDOSCOPY N/A 5/1/2020    SIGMOIDOSCOPY FLEXIBLE performed by Jaun Phoenix, MD at [facility] ENDOSCOPY      Social History     Tobacco Use    Smoking status: Former Smoker    Smokeless tobacco: Never Used   Substance Use Topics    Alcohol use: Not on file     No family history on file.    No Known Allergies   Current Facility-Administered Medications   Medication Dose Route Frequency    glycopyrrolate (ROBINUL) injection 0.2 mg  0.2 mg IntraVENous Q4H    bisacodyL (DULCOLAX) suppository 10 mg  10 mg Rectal DAILY PRN    acetaminophen (TYLENOL) suppository 650 mg  650 mg Rectal Q4H PRN    morphine injection 2 mg  2 mg IntraVENous Q4H    haloperidol lactate (HALDOL) injection 2 mg  2 mg IntraVENous Q6H PRN    LORazepam (ATIVAN) injection 0.5 mg  0.5 mg IntraVENous Q30MIN PRN    morphine injection 2 mg  2 mg IntraVENous Q30MIN PRN    sodium chloride (NS) flush 5-10 mL  5-10 mL IntraVENous PRN        PHYSICAL EXAM     Wt Readings from Last 3 Encounters:   07/22/20 86.2 kg (190 lb)   07/06/20 86.2 kg (190 lb)   06/03/20 86.2 kg (190 lb)       Visit Vitals  /71 (BP 1 Location: Right arm, BP Patient Position: At rest)   Pulse 64   Temp 98.2 °F (36.8 °C)   Resp 15   SpO2 98%       Supplemental O2  [] Yes  [] NO  Last bowel movement: *unknown    Currently this patient has:  [] Peripheral IV [] PICC  [] PORT [] ICD    [x] Colmenares Catheter [] NG Tube   [] PEG Tube    [] Rectal Tube [] Drain  [x] Other: ostomy    Constitutional: lying abed awake but not responsive, elderly, frail, NAD  Eyes: open but do not focus or track, lids normal, no drainage  ENMT: nares normal, dryness of mm  Cardiovascular: rrr, no LE edema  Respiratory: irregular breathing pattern, normal rate, +secretions  Gastrointestinal: soft, non-tender  Musculoskeletal: no deformities or TTP  Skin: warm, dry, no mottling  Neurologic: does not focus gaze or track movements, does not respond to conversation  Psychiatric: not restless at this time      Pertinent Lab and or Imaging Tests:  Lab Results   Component Value Date/Time    Sodium 143 07/06/2020 10:57 AM    Potassium 3.2 (L) 07/06/2020 10:57 AM    Chloride 113 (H) 07/06/2020 10:57 AM    CO2 23 07/06/2020 10:57 AM    Anion gap 7 07/06/2020 10:57 AM    Glucose 114 (H) 07/06/2020 10:57 AM    BUN 21 (H) 07/06/2020 10:57 AM    Creatinine 1.64 (H) 07/06/2020 10:57 AM    BUN/Creatinine ratio 13 07/06/2020 10:57 AM    GFR est AA 49 (L) 07/06/2020 10:57 AM    GFR est non-AA 41 (L) 07/06/2020 10:57 AM    Calcium 8.5 07/06/2020 10:57 AM     Lab Results   Component Value Date/Time    Protein, total 6.8 07/06/2020 10:57 AM    Albumin 2.8 (L) 07/06/2020 10:57 AM           Total time:   Counseling / coordination time:   > 50% counseling / coordination?:

## 2020-08-17 NOTE — PROGRESS NOTES
Initial visit attempted. Pt unable to respond, appeared comfortable, with normal breaths. [place] provided at bedside with empathic touch. Reached out to daughter Miryam Dial and left a vzm introducing self and role, informing of visit and exploring spiritual and supportive needs. Advised of availability as needed. Chaplains will follow up as able/needed. TOYA Baird. Div

## 2020-08-17 NOTE — HOSPICE
[facility] LCSW note; This LCSW and Geraldine Hilton shadowing this LCSW in to see pt for SW visit. Pt is minimally responsive, appears to be declining. LCSW introduced self as SWer from [facility] and part of pts care team. LCSW spoke to pts home SWer Carlos Taya MSW, discussed family concerns and pts plan of care. LCSW will reach out to pts step daughter Chula Hdz- to update introduce self and update her on pts clinical status once MD has made her visit. Hospice Balta offered a prayer. This LCSW, Balta Donahue, Stanislav Chatman Rn and Dr. Tracey Moon met with step[daughter Haile Santana at bedside. Step dtr was holding pts hand. Step daughter is accepting, realistic. LCSW provided reflective listening for step daughter as she talked about the past 4 months of pts life and a \"rally\" he experienced. . Step daughter grateful she has been able to keep pt in her home. Step daughter reports significant decline in pt since Friday. Naheed Mendoza LCSW provided reflective listening for step daughter as she talked about  pt  being \"tired\" and \"ready to go\" to be with her mother, who  2 yrs ago. Step daughter has been pcg for pt. LCSW affirmed her role as caregiver. LCSW provided supportive counseling in coping with pts end stage cancer and care giver stress. Daughter reports she has not been sleeping well,  has been \" on edge\" as she has been so \"used to him being at home\". LCSW provided support for her in processing the above changes. Next steps were discussed. Pt is currently at the [facility] LOC. If pt were to stabilize he has 4 nights remaining of his respite period if needed. Daughter reports he would have difficulty caring for pt at home now that he is \"bedridden\". Hospice team and step daughter talked about taking one day at a time. LCSW will continue to assess and monitor pt and family needs. Per chart review Bridget  in [place] to serve.

## 2020-08-17 NOTE — PROGRESS NOTES
Problem: Falls - Risk of  Goal: *Absence of Falls  Description: Document [facility] Fall Risk and appropriate interventions in the flowsheet. Outcome: Progressing Towards Goal  Note: Fall Risk Interventions:       Mentation Interventions: Adequate sleep, hydration, pain control, Door open when patient unattended    Medication Interventions: Evaluate medications/consider consulting pharmacy    Elimination Interventions: Call light in reach, Toileting schedule/hourly rounds              Problem: Patient Education: Go to Patient Education Activity  Goal: Patient/Family Education  Outcome: Progressing Towards Goal     Problem: Pressure Injury - Risk of  Goal: *Prevention of pressure injury  Description: Document Baltazar Scale and appropriate interventions in the flowsheet. Outcome: Progressing Towards Goal  Note: Pressure Injury Interventions:  Sensory Interventions: Assess changes in LOC, Assess need for specialty bed, Avoid rigorous massage over bony prominences, Float heels, Keep linens dry and wrinkle-free, Minimize linen layers, Pressure redistribution bed/mattress (bed type), Turn and reposition approx. every two hours (pillows and wedges if needed)    Moisture Interventions: Absorbent underpads, Apply protective barrier, creams and emollients, Check for incontinence Q2 hours and as needed, Minimize layers    Activity Interventions: Assess need for specialty bed, Pressure redistribution bed/mattress(bed type)    Mobility Interventions: Assess need for specialty bed, Float heels, Pressure redistribution bed/mattress (bed type), Turn and reposition approx.  every two hours(pillow and wedges)    Nutrition Interventions: Document food/fluid/supplement intake    Friction and Shear Interventions: Apply protective barrier, creams and emollients, Lift sheet, Minimize layers                Problem: Patient Education: Go to Patient Education Activity  Goal: Patient/Family Education  Outcome: Progressing Towards Goal Problem: Pain  Goal: Verbalize satisfaction of level of comfort and symptom control  Outcome: Progressing Towards Goal     Problem: Anxiety/Agitation  Goal: Verbalize and demonstrate ability to manage anxiety  Description: The patient/family/caregiver will verbalize and demonstrate ability to manage the patient's anxiety throughout hospice care. Outcome: Progressing Towards Goal     Problem: End of Life Process  Goal: Demonstrate understanding of end of life processes  Description: Patient/caregiver will understand end of life processes. Outcome: Progressing Towards Goal     Problem: Dyspnea Due to End of Life  Goal: Demonstrate understanding of and ability to manage respiratory symptoms at end of life  Outcome: Progressing Towards Goal     Problem: Spiritual Evaluation  Goal: Identify beliefs/practices that support hospice experience  Description: Patient/family identify their beliefs/practices that impair Hospice experience. Patient/family identify their beliefs/practices that support Hospice experience. Patient coping identified. Spiritual distress identified and decreased with visit.   Outcome: Progressing Towards Goal

## 2020-08-17 NOTE — PROGRESS NOTES
Initial visit attempted. Pt unable to respond, appeared comfortable, with normal breaths. [place] provided at bedside with empathic touch. Reached out to daughter Merlyn Calloway and left a vzm introducing self and role, informing of visit and exploring spiritual and supportive needs. Advised of availability as needed. Chaplains will follow up as able/needed. TOYA Torres. Div

## 2020-08-17 NOTE — HOSPICE
[facility] Those in Need  [phone]    GIP Daily Nursing Note   Patient Name: Edi Massey  YOB: 1940  Age: 67 y.o. Date of Visit: 08/17/20  Facility of Care: [facility]  Patient Room: 603/01     Hospice Attending: Tata Jeong MD  Hospice Diagnosis: Rectal cancer [facility]) [C20]    Level of Care: GIP    Current GIP Symptoms    1. Pain   2. Secretions   3. Altered mental status        ASSESSMENT & PLAN   Plan of Care:    1. Education of patient and family regarding end of life care and Hospice plan of care. NOK contact is [place], pt step-daughter. 2. Provide education and support to unit staff caring for hospice patient and family. Provide staff with direct contact information to reach hospice team [phone]   3. Provide support and frequent rounds for patient comfort and safety ongoing  4. Provide  support ongoing, continue to discuss discharge plan. 5. Provide  and bereavement support ongoing  6. Continue with scheduled IV Morphine every 4 hours; continue with PRN medications available for symptom management. 7. Maintain skin integrity as tolerated for hospice patient, turning and repositioning for comfort, and specialty mattress if appropriate  8. Scheduled Robinul IV Q 4 hours with morphine dosing. Spiritual Interventions:Family seem to be of [place]. No discussion today. Psych/ Social/ Emotional Interventions: Continue to offer support to family. Plan to speak with patients step-daughter later today as planned. Afua Hughes shared that she lost her mother 2 years ago and does not want to see him suffer. She does not plan to visit now that he is in his \"last days\". She wishes to remember \"the way he was\" prior to this admission to [facility]. Care Coordination Needs: Continue to communicate with unit staff to best manage patient's symptoms while GIP LOC.     Care plan and New Orders discussed / approved with Dio Warren MD.    Description History and Chart Review   If this is initial GIP note must document RN assessment/MD communication in previous setting. Specifically document nursing/medication needs in last 24 hours to support GIP care  Narrative History of last 24 hours that demonstrates care cannot be provided in another setting:  Frequent bedside rounding for symptom management and safety. Continue with IV medications as pt did not tolerate PO or SL meds. What has been done to control the patient's symptoms in the last 24 hours? IV medications for symptom relief. Does the patient currently require IV medications? Yes  Does the patient currently require scheduled medications? Yes  Does the patient currently require a PCA? No    List number of doses of PRN medications in last 24 hours:  Medication 1:  Number of doses:    Medication 2:   Number of doses:    Medication 3:   Number of doses:    Supporting documentation for GIP need for pain control:  [x] Frequent evaluation by a doctor, nurse practitioner, nurse   [x] Frequent medication adjustment    [x] IVs that cannot be administered at home   [] Aggressive pain management   [] Complicated technical delivery of medications              Supporting documentation for GIP need for symptom control:  [x]  Sudden decline necessitating intensive nursing intervention  []  Uncontrolled / intractable nausea or vomiting   []  Pathological fractures  []  Advanced open wounds requiring frequent skilled care  [] Unmanageable respiratory distress  [x] New or worsening delirium   [] Delirium with behavior issues: Is 24 hour caregiver present due to safety concerns with agitation? (yes/no)  [] Imminent death - with skilled nursing needs documented above    DISCHARGE PLANNING     1. Discharge Plan: Pt will more than likely  at [facility] but if stabilizes, family will take pt home.   2. Patient/Family teaching: EOL processes to family during respite stay and into GIP admission on 2020.  3. Response to patient/family teaching: States agreement with hospice plan of care under GIP LOC     ASSESSMENT    KARNOFSKY: 10    Prognosis estimated based on 08/17/20 clinical assessment is:     [x] Hours to Days       Quality Measure: Patient self-reports:  [] Yes    [x] No    Adult Non-Verbal Pain Assessment Score: 4/10     Face  [] 0   No particular expression or smile  [x] 1   Occasional grimace, tearing, frowning, wrinkled forehead  [] 2   Frequent grimace, tearing, frowning, wrinkled forehead     Activity (movement)  [] 0   Lying quietly, normal position  [x] 1   Seeking attention through movement or slow, cautious movement  [] 2   Restless, excessive activity and/or withdrawal reflexes     Guarding  [] 0   Lying quietly, no positioning of hands over areas of body  [] 1   Splinting areas of the body, tense  [x] 2   Rigid, stiff     Physiology (vital signs)  [x] 0   Stable vital signs  [] 1   Change in any of the following: SBP > 20mm Hg; HR > 20/minute  [] 2   Change in any of the following: SBP > 30mm Hg; HR > 25/minute     Respiratory  [x] 0   Baseline RR/SpO2, compliant with ventilator  [] 1   RR > 10 above baseline, or 5% drop SpO2, mild asynchrony with ventilator  [] 2   RR > 20 above baseline, or 10% drop SpO2, asynchrony with ventilator      CLINICAL INFORMATION     Patient Vitals for the past 12 hrs:   Temp Pulse Resp BP SpO2   08/17/20 0814 98.2 °F (36.8 °C) 64 15 134/71 98 %       Currently this patient has:  [] Supplemental O2   [x] IV    [] PICC      [] PORT   [] NG Tube    [] PEG Tube   [] Ostomy     [] Colmenares draining _______ urine  [] Other:     SIGNS/PHYSICAL FINDINGS     Skin (including wound):  [] Warm, dry, supple, intact and color normal for race  [x] Warm   [] Dry   [] Cool     [x] Clammy       [] Diaphoretic    Turgor   [] Normal   [x] Decreased  Color:   [] Pink   [] Pale   [] Cyanotic   [] Erythema   [] Jaundice   [x] Normal for Race  [x]  Wounds: Protective ointment on sacrum     Neuro:  [x] Lethargy  [] Restlessness / agitation  [] Confusion / delirium  [] Hallucinations  [x] Responds to maximal stimulation  [] Unresponsive  [] Seizures     Cardiac:  [] Dyspnea on Exertion  [] JVD  [] Murmur  [] Palpitations  [] Hypotension  [] Hypertension  [] Tachycardia  [] Bradycardia  [] Irregular HR  [x] Pulses Decreased  [] Pulses Absent  [] Edema:       (Location, Grade and Pitting)  [] Mottling:      (Location)    Respiratory:  Breath sounds:    [x] Diminished   [] Wheeze   [x] Rhonchi   [] Rales   [] Even and unlabored  [x] Labored: at times. Uneven, expiratory moans audible   [] Cough   [] Non Productive   [] Productive    [] Description:           [] Deep suctioned   [] O2 at ___ LPM  [] High flow oxygen greater than 10 LPM  [] Bi-Pap    GI  [x] Abdomen soft non-tender  [] Ascites  [] Nausea  [] Vomiting  [x] Incontinent of bowels  [x] Bowel sounds yes/no  [] Diarrhea  [] Constipation (see above including last bowel movement)  [] Checked for impaction  [x] Last BM passing gas    Nutrition  Diet:_______NPO___  Appetite:   [] Good   [] Fair   [] Poor   [] Tube Feeding       [] Voiding  [x] Incontinent   [] Colmenares    Musculoskeletal  [] Balance/[place] Unsteady   [x] Weak   Strength:    [] Normal    [] Limited    [x] Decreasing   Activities:    [] Up as tolerated   [x] Bedridden    [] Specify:    SAFETY  [x] 24 hr. Caregiver   [x] Side rails ?     [x] Hospital bed   [x] Reviewed Falls & Safety       ALLERGIES AND MEDICATIONS     Allergies: No Known Allergies    Current Facility-Administered Medications   Medication Dose Route Frequency    bisacodyL (DULCOLAX) suppository 10 mg  10 mg Rectal DAILY PRN    acetaminophen (TYLENOL) suppository 650 mg  650 mg Rectal Q4H PRN    morphine injection 2 mg  2 mg IntraVENous Q4H    haloperidol lactate (HALDOL) injection 2 mg  2 mg IntraVENous Q6H PRN    glycopyrrolate (ROBINUL) injection 0.2 mg  0.2 mg IntraVENous Q4H PRN    LORazepam (ATIVAN) injection 0.5 mg  0.5 mg IntraVENous Q30MIN PRN    morphine injection 2 mg  2 mg IntraVENous Q30MIN PRN    sodium chloride (NS) flush 5-10 mL  5-10 mL IntraVENous PRN          Visit Time In: 09:30  Visit Time Out: 10:30  Fatimah Torres RN, [place] [address] Nurse Liaison  [phone] [place]  [phone] Office

## 2020-08-18 NOTE — PROGRESS NOTES
Problem: Falls - Risk of  Goal: *Absence of Falls  Description: Document [facility] Fall Risk and appropriate interventions in the flowsheet.   Outcome: Progressing Towards Goal  Note: Fall Risk Interventions:       Mentation Interventions: Adequate sleep, hydration, pain control, Door open when patient unattended    Medication Interventions: Evaluate medications/consider consulting pharmacy    Elimination Interventions: Call light in reach, Toileting schedule/hourly rounds

## 2020-08-18 NOTE — PROGRESS NOTES
08/17/20 1951   Vital Signs   Temp 98.5 °F (36.9 °C)   Temp Source Axillary   Pulse (Heart Rate) 78   Heart Rate Source Monitor   Resp Rate 16   O2 Sat (%) 97 %   Level of Consciousness (!) Unresponsive   /87   MAP (Calculated) 104   BP 1 Method Automatic   BP 1 Location Left arm   BP Patient Position At rest   MEWS Score 4   patient on hospice

## 2020-08-18 NOTE — PROGRESS NOTES
[facility]  [phone]    Patient Name: Elgin Mccord  YOB: 1940    Date of Provider Hospice Visit: 08/18/20    Level of Care:   [x] General Inpatient (GIP)    [] Routine   [] Respite    Current Location of Care:  [x] [facility] [] [facility] [] [address] [place] [facility] [] [facility] [] Hospice House [facility]    IF [facility], patient referred from:  [] [facility] [] [facility] [] [address] [place] [facility] [] [facility] [] Home [] Other:     Date of Original Hospice Admission: 7/23/2020  Hospice Medical Director at time of admission: Rohan Comer MD    Principle Hospice Diagnosis: Rectal Cancer  Diagnoses RELATED to the terminal prognosis: pain, AM  Other Diagnoses:      HOSPICE SUMMARY   Do not cut and paste chart information other than imaging findings  Elgin Mccord is a 66 y/o male admitted to [facility] on 8/15/2020 for respite care, today is transitioning to [facility]. He has a history of HTN, CKD3, dementia, rectal cancer s/p diverting colostomy 5/5/2020, severe pro-kaleb malnutrition. At home, he has declined, is bed ridden, unable to eat or drink anything. Functionally, the patient's Karnofsky and/or Palliative Performance Scale has declined over a period of weeks and is estimated at 10. The patient is dependent on the following ADLs: all ADLs. Objective information that support this patients limited prognosis includes:   ABD CT 5/6/2020   1. Hyperenhancing rectal lesion, as described above, consistent with neoplasm. Thickening of the colon and multiple small bowel loops. 2. Right upper lobe groundglass attenuation and mild patchy air space. 3. Trace bilateral pleural fluid. 4. Urinary bladder wall thickening and perivesicular inflammatory stranding. Findings may represent cystitis. 5. Anasarca.       The patient/family chose comfort measures with the support of Hospice.      HOSPICE DIAGNOSES   Active Symptoms:  -Generalized Pain   -Secretions   -Altered mental status/decreased responsiveness  -Irregular breathing pattern  -Hospice care     PLAN   1. Transferred LOC from respite to [facility] on 8/16: pt with sudden decline, no longer tolerating SL medications, requiring IV for scheduled pain medication, requires frequent evaluation and medication adjustment. 2. To improve control of dyspnea and pain, will titrate Morphine to 4 mg IV every 4 hours routinely and prn pain, dyspnea. 3. Continue Robinul 0.2mg every 4 hours routinely for secretion preventions. 4. Will have lorazepam 0.5 mg every 30 minutes prn agitation. 5. Comfort meds for other sxs  6. Prn bisacodyl available for constipation  7.  and SW to support family needs  8. Disposition: if sxs stabilize will consider return to respite LOC  9. Hospice Plan of care was reviewed in detail and agree with current plan of care    Prognosis estimated based on 08/18/20 clinical assessment is:   [x] Hours to Days  [] Days to Weeks    [] Other:    Communicated plan of care with: Hospice Case Manager; Hospice IDT; Care Team     GOALS OF CARE     Patient/Medical POA stated Goal of Care: comfort care    [x] I have reviewed and/or updated ACP information in the Advance Care Planning Navigator. This information is available in the [address] [place] [address] Drive link in the patient's chart header. Primary Decision Maker (Health Care Agent):   Primary Decision Maker: Tae Dillon (STEP-DTR) - Daughter - [phone]    Resuscitation Status: DNR  If DNR is there a Durable DNR on file? : [] Yes [x] No (If no, complete Durable DNR)    HISTORY     History obtained from: chart, RN    CHIEF COMPLAINT: patient not responsive to questions  The patient is:   [] Verbal  [] Nonverbal  [x] Unresponsive    HPI/SUBJECTIVE:  Pt was admitted 8/15 for respite care, on exam today he is unresponsive, moans briefly when stimulated. 8/17: daughter at bedside and is playing [employer] music for patient. His eyes are open. He does not focus on me or respond to conversation. Breathing is irregular.  Daughter believes he is more comfortable today compared to yesterday and is amenable to any changes which will help promote comfort. She asks about prognosis and believes time is short. She thinks he may pass in the next day or two. Says he had previously been praying to go to heaven with his wife who  2 years ago. : patient unresponsive. Bedside nursing reports hiccups and end-expiratory moaning. On my exam patient has tachypnea to mid- to upper 20s with increased wob and moans. 1 prn haldol this am. I asked nursing to give a prn of morphine after my visit.      REVIEW OF SYSTEMS     The following systems were: [] reviewed  [x] unable to be reviewed as patient is unresponsive to questions    Positive ROS include:  Constitutional: fatigue, weakness, in pain, short of breath  Ears/nose/mouth/throat: increased airway secretions  Respiratory:shortness of breath, wheezing  Gastrointestinal:poor appetite, nausea, vomiting, abdominal pain, constipation, diarrhea  Musculoskeletal:pain, deformities, swelling legs  Neurologic:confusion, hallucinations, weakness  Psychiatric:anxiety, feeling depressed, poor sleep  Endocrine:     Adult Non-Verbal Pain Assessment Score: 2    Face  [] 0   No particular expression or smile  [x] 1   Occasional grimace, tearing, frowning, wrinkled forehead  [] 2   Frequent grimace, tearing, frowning, wrinkled forehead    Activity (movement)  [x] 0   Lying quietly, normal position  [] 1   Seeking attention through movement or slow, cautious movement  [] 2   Restless, excessive activity and/or withdrawal reflexes    Guarding  [x] 0   Lying quietly, no positioning of hands over areas of body  [] 1   Splinting areas of the body, tense  [] 2   Rigid, stiff    Physiology (vital signs)  [x] 0   Stable vital signs  [] 1   Change in any of the following: SBP > 20mm Hg; HR > 20/minute  [] 2   Change in any of the following: SBP > 30mm Hg; HR > 25/minute    Respiratory  [] 0   Baseline RR/SpO2, compliant with ventilator  [x] 1 RR > 10 above baseline, or 5% drop SpO2, mild asynchrony with ventilator  [] 2   RR > 20 above baseline, or 10% drop SpO2, asynchrony with ventilator     FUNCTIONAL ASSESSMENT     Palliative Performance Scale (PPS):  10%       PSYCHOSOCIAL/SPIRITUAL ASSESSMENT     Active Problems:    Rectal cancer ([address] (5/13/2020)      Past Medical History:   Diagnosis Date    History of vascular access device 05/04/2020    [facility] VAT 5Fr Triple PICC Right brachial 43cm for TPN      Past Surgical History:   Procedure Laterality Date    FLEXIBLE SIGMOIDOSCOPY N/A 5/1/2020    SIGMOIDOSCOPY FLEXIBLE performed by Quinn Tate MD at [address] [facility] History     Tobacco Use    Smoking status: Former Smoker    Smokeless tobacco: Never Used   Substance Use Topics    Alcohol use: Not on file     No family history on file.    No Known Allergies   Current Facility-Administered Medications   Medication Dose Route Frequency    morphine injection 4 mg  4 mg IntraVENous Q4H    morphine injection 4 mg  4 mg IntraVENous Q30MIN PRN    glycopyrrolate (ROBINUL) injection 0.2 mg  0.2 mg IntraVENous Q4H    bisacodyL (DULCOLAX) suppository 10 mg  10 mg Rectal DAILY PRN    acetaminophen (TYLENOL) suppository 650 mg  650 mg Rectal Q4H PRN    haloperidol lactate (HALDOL) injection 2 mg  2 mg IntraVENous Q6H PRN    LORazepam (ATIVAN) injection 0.5 mg  0.5 mg IntraVENous Q30MIN PRN    sodium chloride (NS) flush 5-10 mL  5-10 mL IntraVENous PRN        PHYSICAL EXAM     Wt Readings from Last 3 Encounters:   07/22/20 86.2 kg (190 lb)   07/06/20 86.2 kg (190 lb)   06/03/20 86.2 kg (190 lb)       Visit Vitals  /84 (BP 1 Location: Left arm, BP Patient Position: At rest)   Pulse 85   Temp 98.7 °F (37.1 °C)   Resp 24   SpO2 97%     Currently this patient has:  [] Peripheral IV [] PICC  [] PORT [] ICD    [x] Colmenares Catheter [] NG Tube   [] PEG Tube    [] Rectal Tube [] Drain  [x] Other: ostomy    Constitutional: lying abed not awake, elderly, frail, NAD, moans multiple times  Eyes: eyes open but does not focus or track, lids normal, no drainage  ENMT: nares normal, dryness of mm  Cardiovascular: rrr, no LE edema  Respiratory: RR mid to upper 20s with mild increased breathing, +secretions  Gastrointestinal: soft, non-tender  Musculoskeletal: no deformities or TTP  Skin: warm, dry, no mottling  Neurologic: does not focus gaze or track movements, does not move extremities during exam  Psychiatric: not restless at this time, unresponsive to questions      Pertinent Lab and or Imaging Tests:  Lab Results   Component Value Date/Time    Sodium 143 07/06/2020 10:57 AM    Potassium 3.2 (L) 07/06/2020 10:57 AM    Chloride 113 (H) 07/06/2020 10:57 AM    CO2 23 07/06/2020 10:57 AM    Anion gap 7 07/06/2020 10:57 AM    Glucose 114 (H) 07/06/2020 10:57 AM    BUN 21 (H) 07/06/2020 10:57 AM    Creatinine 1.64 (H) 07/06/2020 10:57 AM    BUN/Creatinine ratio 13 07/06/2020 10:57 AM    GFR est AA 49 (L) 07/06/2020 10:57 AM    GFR est non-AA 41 (L) 07/06/2020 10:57 AM    Calcium 8.5 07/06/2020 10:57 AM     Lab Results   Component Value Date/Time    Protein, total 6.8 07/06/2020 10:57 AM    Albumin 2.8 (L) 07/06/2020 10:57 AM           Total time:   Counseling / coordination time:   > 50% counseling / coordination?:

## 2020-08-18 NOTE — HOSPICE
[facility]  [phone]    GIP Daily Nursing Note   Patient Name: Castro Graf  YOB: 1940  Age: 67 y.o. Date of Visit: 08/18/20  Facility of Care: [facility]  Patient Room: 603/01     Hospice Attending: Martina Florian MD  Hospice Diagnosis: Rectal cancer ([address] [C20]    Level of Care: GIP    Current GIP Symptoms    1. Pain   2. Secretions   3. Altered mental status  4. Hiccups      ASSESSMENT & PLAN     Plan of Care:     1. Education of patient and family regarding end of life care and Hospice plan of care. NOK contact is Annalee, pt step-daughter. 2. Provide education and support to unit staff caring for hospice patient and family. Provide staff with direct contact information to reach hospice team [phone]. Educated to use Haldol PRN for hiccups. 3. Provide support and frequent rounds for patient comfort and safety ongoing  4. Provide  support ongoing, continue to discuss discharge plan. 5. Provide  and bereavement support ongoing  6. Continue with scheduled IV Morphine every 4 hours; continue with PRN medications available for symptom management. 7. Maintain skin integrity as tolerated for hospice patient, turning and repositioning for comfort, and specialty mattress if appropriate  8. Scheduled Robinul IV Q 4 hours with morphine dosing.           Spiritual Interventions:Family seem to be of strong  Lalita. Lalita shared by daughter yesterday afternoon, 8-. Will request hospice Chaplain to visit.     Psych/ Social/ Emotional Interventions: Continue to offer support to family. Plan to speak with patients step-daughter later today as planned. Sae Thomas shared that she lost her mother 2 years ago and does not want to see him suffer. Wellington Arteaga does not plan to visit now that he is in his \"last days\".  She wishes to remember \"the way he was\" prior to this admission to [facility].      Care Coordination Needs: Continue to communicate with unit staff to best manage patient's symptoms while GIP LOC.     Care plan and New Orders discussed / approved with Carlee Miller MD.    Description History and Chart Review     Narrative History of last 24 hours that demonstrates care cannot be provided in another setting:  Frequent bedside rounding for symptom management and safety. Continue with IV medications as pt did not tolerate PO or SL meds.     What has been done to control the patient's symptoms in the last 24 hours? IV medications for symptom relief.     Does the patient currently require IV medications? Yes  Does the patient currently require scheduled medications? Yes  Does the patient currently require a PCA? No      List number of doses of PRN medications in last 24 hours:  Medication 1: Haldol IV   Number of doses: 1    Medication 2: Morphine IV   Number of doses: 1    Medication 3:   Number of doses:    Supporting documentation for GIP need for pain control:  [x]? Frequent evaluation by a doctor, nurse practitioner, nurse   [x]? Frequent medication adjustment    [x]? IVs that cannot be administered at home   [x]? Aggressive pain management   []? Complicated technical delivery of medications                                                                                                                               Supporting documentation for GIP need for symptom control:  [x]? Sudden decline necessitating intensive nursing intervention  [x]? Uncontrolled / intractable nausea or vomiting or hiccups  []? Pathological fractures  []? Advanced open wounds requiring frequent skilled care  []? Unmanageable respiratory distress  [x]? New or worsening delirium   []? Delirium with behavior issues: Is 24 hour caregiver present due to safety concerns with agitation? (yes/no)  []?  Imminent death - with skilled nursing needs documented above     DISCHARGE PLANNING      1. Discharge Plan: Pt will more than likely  at [facility] but if stabilizes, family will take pt home. 2. Patient/Family teaching: EOL processes to family during respite stay and into GIP admission on 2020.  3. Response to patient/family teaching: States agreement with hospice plan of care under [facility] LOC      ASSESSMENT    KARNOFSKY: 10     Prognosis estimated based on 20 clinical assessment is:      [x]? Hours to Days         Quality Measure:       Patient self-reports:  []? Yes    [x]? No     Adult Non-Verbal Pain Assessment Score: 4/10     Face  []? 0   No particular expression or smile  [x]? 1   Occasional grimace, tearing, frowning, wrinkled forehead  []? 2   Frequent grimace, tearing, frowning, wrinkled forehead     Activity (movement)  []? 0   Lying quietly, normal position  [x]? 1   Seeking attention through movement or slow, cautious movement  []? 2   Restless, excessive activity and/or withdrawal reflexes     Guarding  []? 0   Lying quietly, no positioning of hands over areas of body  [x]? 1   Splinting areas of the body, tense  []? 2   Rigid, stiff     Physiology (vital signs)  [x]? 0   Stable vital signs  []? 1   Change in any of the following: SBP > 20mm Hg; HR > 20/minute  []? 2   Change in any of the following: SBP > 30mm Hg; HR > 25/minute     Respiratory  []? 0   Baseline RR/SpO2, compliant with ventilator  [x]? 1   RR > 10 above baseline, or 5% drop SpO2, mild asynchrony with ventilator  []? 2   RR > 20 above baseline, or 10% drop SpO2, asynchrony with ventilator       [x] Frequent evaluation by a doctor, nurse practitioner, nurse   [x] Frequent medication adjustment    [x] IVs that cannot be administered at home   [] Aggressive pain management   [] Complicated technical delivery of medications                  DISCHARGE PLANNING     1. Discharge Plan: Pt will more than likely  at [facility] but if stabilizes, family will take pt home.   2. Patient/Family teaching: EOL processes to family during respite stay and into [facility] admission on 2020.  3. Response to patient/family teaching: States agreement with hospice plan of care under [facility] LOC     ASSESSMENT    KARNOFSKY: 10      CLINICAL INFORMATION     Patient Vitals for the past 12 hrs:   Temp Pulse Resp BP SpO2   08/17/20 1951 98.5 °F (36.9 °C) 78 16 138/87 97 %       Currently this patient has:  [] Supplemental O2   [x] IV    [] PICC      [] PORT   [] NG Tube    [] PEG Tube   [] Ostomy     [] Colmenares draining _______ urine  [] Other:     SIGNS/PHYSICAL FINDINGS   Skin (including wound):  []? Warm, dry, supple, intact and color normal for race  [x]? Warm   []? Dry   []? Cool     [x]? Clammy       []? Diaphoretic    Turgor              []? Normal              [x]? Decreased  Color:              []? Pink              []? Pale              []? Cyanotic              []? Erythema              []? Jaundice              [x]? Normal for Race  [x]? Wounds: Protective ointment on sacrum      Neuro:  [x]? Lethargy  []? Restlessness / agitation  []? Confusion / delirium  []? Hallucinations  [x]? Responds to maximal stimulation  []? Unresponsive  []? Seizures      Cardiac:  []? Dyspnea on Exertion  []? JVD  []? Murmur  []? Palpitations  []? Hypotension  []? Hypertension  []? Tachycardia  []? Bradycardia  []? Irregular HR  [x]? Pulses Decreased  []? Pulses Absent  []? Edema:       (Location, Grade and Pitting)  []? Mottling:      (Location)     Respiratory:  Breath sounds:               [x]? Diminished              []? Wheeze              [x]? Rhonchi              []? Rales   []? Even and unlabored  [x]? Labored: at times. Uneven, expiratory moans audible   []? Cough              []? Non Productive              []? Productive                          []? Description:           []? Deep suctioned   []? O2 at ___ LPM  []? High flow oxygen greater than 10 LPM  []? Bi-Pap     GI  [x]? Abdomen soft non-tender  []? Ascites  []? Nausea  []? Vomiting  [x]? Incontinent of bowels  [x]? Bowel sounds yes/no  []? Diarrhea  []?  Constipation (see above including last bowel movement)  []? Checked for impaction  [x]? Last BM passing gas     Nutrition  Diet:_______NPO___  Appetite:   []? Good   []? Fair   []? Poor   []? Tube Feeding        []? Voiding  [x]? Incontinent   []? Colmenares     Musculoskeletal  []? Balance/[place] Unsteady   [x]? Weak   Strength:               []? Normal               []? Limited               [x]? Decreasing   Activities:               []? Up as tolerated              [x]? Bedridden               []? Specify:     SAFETY  [x]? 24 hr. Caregiver   [x]? Side rails ? [x]? Hospital bed   [x]?  Reviewed Falls & Safety        ALLERGIES AND MEDICATIONS     Allergies: No Known Allergies    Current Facility-Administered Medications   Medication Dose Route Frequency    glycopyrrolate (ROBINUL) injection 0.2 mg  0.2 mg IntraVENous Q4H    bisacodyL (DULCOLAX) suppository 10 mg  10 mg Rectal DAILY PRN    acetaminophen (TYLENOL) suppository 650 mg  650 mg Rectal Q4H PRN    morphine injection 2 mg  2 mg IntraVENous Q4H    haloperidol lactate (HALDOL) injection 2 mg  2 mg IntraVENous Q6H PRN    LORazepam (ATIVAN) injection 0.5 mg  0.5 mg IntraVENous Q30MIN PRN    morphine injection 2 mg  2 mg IntraVENous Q30MIN PRN    sodium chloride (NS) flush 5-10 mL  5-10 mL IntraVENous PRN          Visit Time In: 07:00  Visit Time Out: 07:40    Ronak Shaw RN, [facility] Nurse Liaison  [phone] Mobile  [phone] Office

## 2020-08-18 NOTE — HOSPICE
[facility] LCSW note: This LCSW visited the room of pt, no family was present. Pt appears minimally responsive. LCSW provided a supportive presence for pt. LCSW offered a prayer of peace and comfort for pt. LCSW will continue to assess and monitor pt and family needs.

## 2020-08-19 NOTE — HOSPICE
[facility] LCSW note: This LCSW in to meet with pt, who has declined overnight. Pt is having heavy secretions, and has had changes in breathing. Pt is minimally responsive. Ronda Carrizales and close family friends who are like \"children to pt\" Kwabena Abdi and No Parikh are at bedside as well. LCSW provided reflective listening for family as they talked about EOL. LCSW provided education re the EOL process, normalized increased secretions, and shared GFMS. LCSW and family talked about any needed resources. LCSW sent referral for ronda Carrizales for the [facility] as he is here from [place]. LCSW will continue to assess and monitor pt and family needs.

## 2020-08-19 NOTE — HOSPICE
[facility]  [phone]    GIP Daily Nursing Note   Patient Name: Kacy Gunter  YOB: 1940  Age: 67 y.o. Date of Visit: 08/19/20  Facility of Care: [facility]  Patient Room: 603/     Hospice Attending: Hoa Ramiers MD  Hospice Diagnosis: Rectal cancer ([facility] [address] [C20]    Level of Care: GIP    Current GIP Symptoms    1. Pain   2. Secretions   3. Altered mental status  4. Dyspnea      ASSESSMENT & PLAN   1. Patient does not appear to have pain this morning, no moaning or grimacing  2. IV robinul scheduled every 4 hours, patient placed in recovery position to aid in draining secretions, large amount of secretions suctioned this morning  3. Decreased responsiveness- patient only responds to pain, continue to monitor for non verbal signs or pain or respiratory distress  4. Dyspnea- patient dyspneic on assessment, RR 28, primary RN at bedside giving PRN medication, meds changed to 1mg IV dilaudid scheduled every 4 hours and PRN available             Spiritual Interventions: in to see patient yesterday,  available 24/7     Psych/ Social/ Emotional Interventions: Continue to support family     Care Coordination Needs: Coordinate needs with [facility] staff     Care plan and New Orders discussed / approved with Charly Gongora MD.    Description History and Chart Review     Narrative History of last 24 hours that demonstrates care cannot be provided in another setting: Patient requires frequent nursing assessments to assess for non verbal signs or symptoms of pain/dyspnea       What has been done to control the patient's symptoms in the last 24 hours? IV medications for symptom relief.     Does the patient currently require IV medications? Yes  Does the patient currently require scheduled medications? Yes  Does the patient currently require a PCA?  No      List number of doses of PRN medications in last 24 hours:  Medication 1: Morphine IV  Number of doses: 2    Medication 2:   Number of doses:     Medication 3:   Number of doses:    Supporting documentation for GIP need for pain control:  [x]? Frequent evaluation by a doctor, nurse practitioner, nurse   [x]? Frequent medication adjustment    [x]? IVs that cannot be administered at home   [x]? Aggressive pain management   []? Complicated technical delivery of medications                                                                                                                               Supporting documentation for GIP need for symptom control:  [x]? Sudden decline necessitating intensive nursing intervention  [x]? Uncontrolled / intractable nausea or vomiting or hiccups  []? Pathological fractures  []? Advanced open wounds requiring frequent skilled care  [x]? Unmanageable respiratory distress  []? New or worsening delirium   []? Delirium with behavior issues: Is 24 hour caregiver present due to safety concerns with agitation? (yes/no)  []? Imminent death - with skilled nursing needs documented above     DISCHARGE PLANNING      1. Discharge Plan: Pt will more than likely  at [facility] but if stabilizes, family will take pt home. 2. Patient/Family teaching: No family present, patient unable to learn due to decreased responsiveness     3. Response to patient/family teaching: No family present     ASSESSMENT    KARNOFSKY: 10     Prognosis estimated based on 20 clinical assessment is:      [x]? Hours to Days         Quality Measure:       Patient self-reports:  []? Yes    [x]?  No     Adult Non-Verbal Pain Assessment Score: 3/10     Face  []? 0   No particular expression or smile  [x]? 1   Occasional grimace, tearing, frowning, wrinkled forehead  []? 2   Frequent grimace, tearing, frowning, wrinkled forehead     Activity (movement)  []? 0   Lying quietly, normal position  [x]? 1   Seeking attention through movement or slow, cautious movement  []? 2   Restless, excessive activity and/or withdrawal reflexes     Guarding  [x]? 0   Lying quietly, no positioning of hands over areas of body  []? 1   Splinting areas of the body, tense  []? 2   Rigid, stiff     Physiology (vital signs)  [x]? 0   Stable vital signs  []? 1   Change in any of the following: SBP > 20mm Hg; HR > 20/minute  []? 2   Change in any of the following: SBP > 30mm Hg; HR > 25/minute     Respiratory  []? 0   Baseline RR/SpO2, compliant with ventilator  [x]? 1   RR > 10 above baseline, or 5% drop SpO2, mild asynchrony with ventilator  []? 2   RR > 20 above baseline, or 10% drop SpO2, asynchrony with ventilator       [x] Frequent evaluation by a doctor, nurse practitioner, nurse   [x] Frequent medication adjustment    [x] IVs that cannot be administered at home   [] Aggressive pain management   [] Complicated technical delivery of medications                  DISCHARGE PLANNING     1. Discharge Plan: Pt will more than likely  at [facility] but if stabilizes, family will take pt home. 2. Patient/Family teaching: EOL processes to family during respite stay and into [facility] admission on 2020.  3. Response to patient/family teaching: States agreement with hospice plan of care under [facility] LOC     ASSESSMENT    KARNOFSKY: 10      CLINICAL INFORMATION     Patient Vitals for the past 12 hrs:   Temp Pulse Resp BP SpO2   20 0852 -- -- (!) 36 -- --   20 0806 98.6 °F (37 °C) (!) 104 27 109/70 (!) 84 %       Currently this patient has:  [] Supplemental O2   [x] IV    [] PICC      [] PORT   [] NG Tube    [] PEG Tube   [] Ostomy     [] Colmenares draining _______ urine  [] Other:     SIGNS/PHYSICAL FINDINGS   Skin (including wound):  []? Warm, dry, supple, intact and color normal for race  [x]? Warm   []? Dry   []? Cool     [x]? Clammy       []? Diaphoretic    Turgor              []? Normal              [x]?  Decreased  Color:              []? Pink              []? Pale              []? Cyanotic              []? Erythema              []? Jaundice [x]? Normal for Race  [x]? Wounds: Protective ointment on sacrum      Neuro:  [x]? Lethargy  []? Restlessness / agitation  []? Confusion / delirium  []? Hallucinations  [x]? Responds to maximal stimulation  []? Unresponsive  []? Seizures      Cardiac:  [x]? Dyspnea on Exertion  []? JVD  []? Murmur  []? Palpitations  []? Hypotension  []? Hypertension  []? Tachycardia  []? Bradycardia  []? Irregular HR  [x]? Pulses Decreased  []? Pulses Absent  []? Edema: none  []? Mottling: none     Respiratory:  Breath sounds:               [x]? Diminished              []? Wheeze              [x]? Rhonchi              []? Rales   []? Even and unlabored  [x]? Labored: RR 28  []? Cough              []? Non Productive              []? Productive                          []? Description:           []? Deep suctioned   []? O2 at ___ LPM  []? High flow oxygen greater than 10 LPM  []? Bi-Pap     GI  [x]? Abdomen soft non-tender  []? Ascites  []? Nausea  []? Vomiting  [x]? Incontinent of bowels  [x]? Bowel sounds no  []? Diarrhea  []? Constipation (see above including last bowel movement)  []? Checked for impaction  [x]? Last BM 8/17/20     Nutrition  Diet:NPO  Appetite:   []? Good   []? Fair   []? Poor   []? Tube Feeding        []? Voiding  [x]? Incontinent   []? Colmenares     Musculoskeletal  []? Balance/Glasgow Unsteady   [x]? Weak   Strength:               []? Normal               []? Limited               [x]? Decreasing   Activities:               []? Up as tolerated              [x]? Bedridden               []? Specify:     SAFETY  []? 24 hr. Caregiver   [x]? Side rails ? [x]? Hospital bed   [x]?  Reviewed Falls & Safety        ALLERGIES AND MEDICATIONS     Allergies: No Known Allergies    Current Facility-Administered Medications   Medication Dose Route Frequency    morphine injection 4 mg  4 mg IntraVENous Q4H    morphine injection 4 mg  4 mg IntraVENous Q30MIN PRN    glycopyrrolate (ROBINUL) injection 0.2 mg  0.2 mg IntraVENous Q4H    bisacodyL (DULCOLAX) suppository 10 mg  10 mg Rectal DAILY PRN    acetaminophen (TYLENOL) suppository 650 mg  650 mg Rectal Q4H PRN    haloperidol lactate (HALDOL) injection 2 mg  2 mg IntraVENous Q6H PRN    LORazepam (ATIVAN) injection 0.5 mg  0.5 mg IntraVENous Q30MIN PRN    sodium chloride (NS) flush 5-10 mL  5-10 mL IntraVENous PRN          Visit Time In: 07:50  Visit Time Out: 08:30

## 2020-08-19 NOTE — PROGRESS NOTES
[facility]  [phone]    Patient Name: Basilio Solis  YOB: 1940    Date of Provider Hospice Visit: 08/19/20    Level of Care:   [x] General Inpatient (GIP)    [] Routine   [] Respite    Current Location of Care:  [x] [facility] [] [facility] [] [facility] [] [address] [] Hospice [facility], patient referred from:  [] [facility] [] [facility] [] [facility] [] [address] [] Home [] Other:     Date of Original Hospice Admission: 7/23/2020  Hospice Medical Director at time of admission: Cesar Martel MD    Principle Hospice Diagnosis: Rectal Cancer  Diagnoses RELATED to the terminal prognosis: pain, AM  Other Diagnoses:      HOSPICE SUMMARY   Do not cut and paste chart information other than imaging findings  Basilio Solis is a 68 y/o male admitted to [facility] on 8/15/2020 for respite care, today is transitioning to GIP. He has a history of HTN, CKD3, dementia, rectal cancer s/p diverting colostomy 5/5/2020, severe pro-kaleb malnutrition. At home, he has declined, is bed ridden, unable to eat or drink anything. Functionally, the patient's Karnofsky and/or Palliative Performance Scale has declined over a period of weeks and is estimated at 10. The patient is dependent on the following ADLs: all ADLs. Objective information that support this patients limited prognosis includes:   ABD CT 5/6/2020   1. Hyperenhancing rectal lesion, as described above, consistent with neoplasm. Thickening of the colon and multiple small bowel loops. 2. Right upper lobe groundglass attenuation and mild patchy air space. 3. Trace bilateral pleural fluid. 4. Urinary bladder wall thickening and perivesicular inflammatory stranding. Findings may represent cystitis. 5. Anasarca.       The patient/family chose comfort measures with the support of Hospice.      HOSPICE DIAGNOSES   Active Symptoms:  -Generalized Pain   -Secretions   -Altered mental status/decreased responsiveness  -Irregular breathing pattern  -Hospice care     PLAN   1. Transferred LOC from respite to [facility] on 8/16: pt with sudden decline, no longer tolerating SL medications, requiring IV for scheduled pain medication, requires frequent evaluation and medication adjustment. 2. To improve control of dyspnea, will rotate to dilaudid 1 mg mg IV every 4 hours routinely and prn pain, dyspnea. May need further titration. 3. Continue Robinul 0.2mg every 4 hours routinely for secretion preventions. Also utilize positional drainage and gentle anterior suction (no deep suctioning). 4. Will have lorazepam 0.5 mg every 30 minutes prn agitation. 5. Comfort meds for other sxs  6. Prn bisacodyl available for constipation  7.  and SW to support family needs  8. Disposition: if sxs stabilize will consider return to respite LOC  9. Hospice Plan of care was reviewed in detail and agree with current plan of care    Prognosis estimated based on 08/19/20 clinical assessment is:   [x] Hours to Days  [] Days to Weeks    [] Other:    Communicated plan of care with: Hospice Case Manager; Hospice IDT; Care Team     GOALS OF CARE     Patient/Medical POA stated Goal of Care: comfort care    [x] I have reviewed and/or updated ACP information in the Advance Care Planning Navigator. This information is available in the [facility] [address] link in the patient's chart header. Primary Decision Maker (Health Care Agent):   Primary Decision Maker: Ashley Lazo (STEP-DTR) - Daughter - [phone]    Resuscitation Status: DNR  If DNR is there a Durable DNR on file? : [] Yes [x] No (If no, complete Durable DNR)    HISTORY     History obtained from: chart, RN    CHIEF COMPLAINT: patient not responsive to questions  The patient is:   [] Verbal  [] Nonverbal  [x] Unresponsive    HPI/SUBJECTIVE:  Pt was admitted 8/15 for respite care, on exam today he is unresponsive, moans briefly when stimulated. 8/17: daughter at bedside and is playing [employer] music for patient. His eyes are open.  He does not focus on me or respond to conversation. Breathing is irregular. Daughter believes he is more comfortable today compared to yesterday and is amenable to any changes which will help promote comfort. She asks about prognosis and believes time is short. She thinks he may pass in the next day or two. Says he had previously been praying to go to heaven with his wife who  2 years ago. : patient unresponsive. Bedside nursing reports hiccups and end-expiratory moaning. On my exam patient has tachypnea to mid- to upper 20s with increased wob and moans. 1 prn haldol this am. I asked nursing to give a prn of morphine after my visit. : patient unresponsive. Increasing secretions today. 2 prns morphine needed this morning in addition to scheduled for increased wob and tachypnea.      REVIEW OF SYSTEMS     The following systems were: [] reviewed  [x] unable to be reviewed as patient is unresponsive to questions    Positive ROS include:  Constitutional: fatigue, weakness, in pain, short of breath  Ears/nose/mouth/throat: increased airway secretions  Respiratory:shortness of breath, wheezing  Gastrointestinal:poor appetite, nausea, vomiting, abdominal pain, constipation, diarrhea  Musculoskeletal:pain, deformities, swelling legs  Neurologic:confusion, hallucinations, weakness  Psychiatric:anxiety, feeling depressed, poor sleep  Endocrine:     Adult Non-Verbal Pain Assessment Score: 1    Face  [x] 0   No particular expression or smile  [] 1   Occasional grimace, tearing, frowning, wrinkled forehead  [] 2   Frequent grimace, tearing, frowning, wrinkled forehead    Activity (movement)  [x] 0   Lying quietly, normal position  [] 1   Seeking attention through movement or slow, cautious movement  [] 2   Restless, excessive activity and/or withdrawal reflexes    Guarding  [x] 0   Lying quietly, no positioning of hands over areas of body  [] 1   Splinting areas of the body, tense  [] 2   Rigid, stiff    Physiology (vital signs)  [x] 0   Stable vital signs  [] 1   Change in any of the following: SBP > 20mm Hg; HR > 20/minute  [] 2   Change in any of the following: SBP > 30mm Hg; HR > 25/minute    Respiratory  [] 0   Baseline RR/SpO2, compliant with ventilator  [x] 1   RR > 10 above baseline, or 5% drop SpO2, mild asynchrony with ventilator  [] 2   RR > 20 above baseline, or 10% drop SpO2, asynchrony with ventilator     FUNCTIONAL ASSESSMENT     Palliative Performance Scale (PPS):  10%       PSYCHOSOCIAL/SPIRITUAL ASSESSMENT     Active Problems:    Rectal cancer ([address] (5/13/2020)      Past Medical History:   Diagnosis Date    History of vascular access device 05/04/2020    [facility] VAT 5Fr Triple PICC Right brachial 43cm for TPN      Past Surgical History:   Procedure Laterality Date    FLEXIBLE SIGMOIDOSCOPY N/A 5/1/2020    SIGMOIDOSCOPY FLEXIBLE performed by Anabella Lord MD at [address] [facility] History     Tobacco Use    Smoking status: Former Smoker    Smokeless tobacco: Never Used   Substance Use Topics    Alcohol use: Not on file     No family history on file.    No Known Allergies   Current Facility-Administered Medications   Medication Dose Route Frequency    HYDROmorphone (PF) (DILAUDID) injection 1 mg  1 mg IntraVENous Q4H    HYDROmorphone (PF) (DILAUDID) injection 1 mg  1 mg IntraVENous Q15MIN PRN    glycopyrrolate (ROBINUL) injection 0.2 mg  0.2 mg IntraVENous Q4H    bisacodyL (DULCOLAX) suppository 10 mg  10 mg Rectal DAILY PRN    acetaminophen (TYLENOL) suppository 650 mg  650 mg Rectal Q4H PRN    haloperidol lactate (HALDOL) injection 2 mg  2 mg IntraVENous Q6H PRN    LORazepam (ATIVAN) injection 0.5 mg  0.5 mg IntraVENous Q30MIN PRN    sodium chloride (NS) flush 5-10 mL  5-10 mL IntraVENous PRN        PHYSICAL EXAM     Wt Readings from Last 3 Encounters:   07/22/20 86.2 kg (190 lb)   07/06/20 86.2 kg (190 lb)   06/03/20 86.2 kg (190 lb)       Visit Vitals  /70 (BP 1 Location: Left arm, BP Patient Position: At rest)   Pulse (!) 104   Temp 98.6 °F (37 °C)   Resp 24   SpO2 (!) 84%     Currently this patient has:  [] Peripheral IV [] PICC  [] PORT [] ICD    [x] Colmenares Catheter [] NG Tube   [] PEG Tube    [] Rectal Tube [] Drain  [x] Other: ostomy    Constitutional: lying abed not awake, elderly, frail, NAD  Eyes: eyes open but does not focus or track, lids normal, no drainage  ENMT: nares normal, dryness of mm  Cardiovascular: rrr, no LE edema  Respiratory: RR mid to upper 20s with increased breathing, +worsening secretions  Gastrointestinal: soft, non-tender  Musculoskeletal: no deformities or TTP  Skin: warm, dry, no mottling  Neurologic: does not focus gaze or track movements, does not move extremities during exam  Psychiatric: not restless at this time, unresponsive to questions      Pertinent Lab and or Imaging Tests:  Lab Results   Component Value Date/Time    Sodium 143 07/06/2020 10:57 AM    Potassium 3.2 (L) 07/06/2020 10:57 AM    Chloride 113 (H) 07/06/2020 10:57 AM    CO2 23 07/06/2020 10:57 AM    Anion gap 7 07/06/2020 10:57 AM    Glucose 114 (H) 07/06/2020 10:57 AM    BUN 21 (H) 07/06/2020 10:57 AM    Creatinine 1.64 (H) 07/06/2020 10:57 AM    BUN/Creatinine ratio 13 07/06/2020 10:57 AM    GFR est AA 49 (L) 07/06/2020 10:57 AM    GFR est non-AA 41 (L) 07/06/2020 10:57 AM    Calcium 8.5 07/06/2020 10:57 AM     Lab Results   Component Value Date/Time    Protein, total 6.8 07/06/2020 10:57 AM    Albumin 2.8 (L) 07/06/2020 10:57 AM           Total time:   Counseling / coordination time:   > 50% counseling / coordination?:

## 2020-08-19 NOTE — PROGRESS NOTES
Problem: Falls - Risk of  Goal: *Absence of Falls  Description: Document Humberto Ang Fall Risk and appropriate interventions in the flowsheet. Outcome: Progressing Towards Goal  Note: Fall Risk Interventions:       Mentation Interventions: Adequate sleep, hydration, pain control, Door open when patient unattended    Medication Interventions: Evaluate medications/consider consulting pharmacy    Elimination Interventions: Call light in reach              Problem: Patient Education: Go to Patient Education Activity  Goal: Patient/Family Education  Outcome: Progressing Towards Goal     Problem: Pressure Injury - Risk of  Goal: *Prevention of pressure injury  Description: Document Baltazar Scale and appropriate interventions in the flowsheet. Outcome: Progressing Towards Goal  Note: Pressure Injury Interventions:  Sensory Interventions: Assess changes in LOC    Moisture Interventions: Absorbent underpads    Activity Interventions: Assess need for specialty bed, Pressure redistribution bed/mattress(bed type)    Mobility Interventions: Assess need for specialty bed, Turn and reposition approx. every two hours(pillow and wedges), Pressure redistribution bed/mattress (bed type), Float heels    Nutrition Interventions: Document food/fluid/supplement intake    Friction and Shear Interventions: Apply protective barrier, creams and emollients                Problem: Patient Education: Go to Patient Education Activity  Goal: Patient/Family Education  Outcome: Progressing Towards Goal     Problem: Pain  Goal: Verbalize satisfaction of level of comfort and symptom control  Outcome: Progressing Towards Goal     Problem: Anxiety/Agitation  Goal: Verbalize and demonstrate ability to manage anxiety  Description: The patient/family/caregiver will verbalize and demonstrate ability to manage the patient's anxiety throughout hospice care.   Outcome: Progressing Towards Goal     Problem: End of Life Process  Goal: Demonstrate understanding of end of life processes  Description: Patient/caregiver will understand end of life processes. Outcome: Progressing Towards Goal     Problem: Dyspnea Due to End of Life  Goal: Demonstrate understanding of and ability to manage respiratory symptoms at end of life  Outcome: Progressing Towards Goal     Problem: Spiritual Evaluation  Goal: Identify beliefs/practices that support hospice experience  Description: Patient/family identify their beliefs/practices that impair Hospice experience. Patient/family identify their beliefs/practices that support Hospice experience. Patient coping identified. Spiritual distress identified and decreased with visit.   Outcome: Progressing Towards Goal

## 2020-08-20 NOTE — PROGRESS NOTES
responded to hospice pt death. Hospice was in the room talking with family. Please contact [phone] [facility] for further support.  follow up as needed.      Nelida Romero M.Div, MACE   [phone])

## 2020-08-20 NOTE — PROGRESS NOTES
[facility] to Those in Need  [phone]    Patient Name: Florencio Hill  YOB: 1940    Date of Provider Hospice Visit: 08/20/20    Level of Care:   [x] General Inpatient (GIP)    [] Routine   [] Respite    Current Location of Care:  [x] [facility] [] [facility] [] [facility] [] [facility] [] [facility]    IF [facility], patient referred from:  [] [facility] [] [facility] [] [facility] [] [address] [place] [address] [] Home [] Other:     Date of Original Hospice Admission: 7/23/2020  Hospice Medical Director at time of admission: Allyn Palomino MD    Principle Hospice Diagnosis: Rectal Cancer  Diagnoses RELATED to the terminal prognosis: pain, AM  Other Diagnoses:      HOSPICE SUMMARY   Do not cut and paste chart information other than imaging findings  Florencio Hill is a 68 y/o male admitted to [facility] on 8/15/2020 for respite care, today is transitioning to GIP. He has a history of HTN, CKD3, dementia, rectal cancer s/p diverting colostomy 5/5/2020, severe pro-kaleb malnutrition. At home, he has declined, is bed ridden, unable to eat or drink anything. Functionally, the patient's Karnofsky and/or Palliative Performance Scale has declined over a period of weeks and is estimated at 10. The patient is dependent on the following ADLs: all ADLs. Objective information that support this patients limited prognosis includes:   ABD CT 5/6/2020   1. Hyperenhancing rectal lesion, as described above, consistent with neoplasm. Thickening of the colon and multiple small bowel loops. 2. Right upper lobe groundglass attenuation and mild patchy air space. 3. Trace bilateral pleural fluid. 4. Urinary bladder wall thickening and perivesicular inflammatory stranding. Findings may represent cystitis. 5. Anasarca.       The patient/family chose comfort measures with the support of Hospice.      HOSPICE DIAGNOSES   Active Symptoms:  -Generalized Pain   -Secretions   -Altered mental status/decreased responsiveness  -Irregular breathing pattern  -Hospice care    Patient assessed this morning. Friends at bedside. Patient appears comfortable with relaxed breathing. 2 prns dilaudid, 1 prn ativan overnight. PLAN   1. [facility] on 8[facility]16: pt with sudden decline, no longer tolerating SL medications, requiring IV for scheduled pain medication, requires frequent evaluation and medication adjustment. 2. Based on sxs, yesterday afternoon/evening, we titrated dilaudid to 2 mg IV every 4 hours routinely and prn pain, dyspnea. 3. Continue Robinul 0.2mg every 4 hours routinely for secretion preventions. Also utilize positional drainage and gentle anterior suction (no deep suctioning). 4. Will have lorazepam 0.5 mg every 30 minutes prn agitation. 5. Comfort meds for other sxs  6. Prn bisacodyl available for constipation  7.  and SW to support family needs  8. Disposition: anticipate that pt will decline and die in the coming hours-days without stabilizing enough for care in the home setting  9. Hospice Plan of care was reviewed in detail and agree with current plan of care    Prognosis estimated based on 08/20/20 clinical assessment is:   [x] Hours to Days  [] Days to Weeks    [] Other:    Communicated plan of care with: Hospice Case Manager; Hospice IDT; Care Team     GOALS OF CARE     Patient/Medical POA stated Goal of Care: comfort care    [x] I have reviewed and/or updated ACP information in the Advance Care Planning Navigator. This information is available in the [address] [place] [address] Drive link in the patient's chart header.     Primary Decision Maker (Health Care Agent):   Primary Decision Maker: Kody Daugherty (STEP-DTR) - Daughter - [phone]    Resuscitation Status: DNR  If DNR is there a Durable DNR on file? : [] Yes [x] No (If no, complete Durable DNR)    HISTORY     History obtained from: chart, RN    CHIEF COMPLAINT: patient not responsive to questions  The patient is:   [] Verbal  [] Nonverbal  [x] Unresponsive    HPI/SUBJECTIVE:  Pt was admitted 8/15 for respite care, on exam today he is unresponsive, moans briefly when stimulated. : daughter at bedside and is playing [employer] music for patient. His eyes are open. He does not focus on me or respond to conversation. Breathing is irregular. Daughter believes he is more comfortable today compared to yesterday and is amenable to any changes which will help promote comfort. She asks about prognosis and believes time is short. She thinks he may pass in the next day or two. Says he had previously been praying to go to heaven with his wife who  2 years ago. : patient unresponsive. Bedside nursing reports hiccups and end-expiratory moaning. On my exam patient has tachypnea to mid- to upper 20s with increased wob and moans. 1 prn haldol this am. I asked nursing to give a prn of morphine after my visit. : patient unresponsive. Increasing secretions today. 2 prns morphine needed this morning in addition to scheduled for increased wob and tachypnea. : patient unresponsive. Agonal, relaxed breathing this am. Friends at bedside and believe time is short. 2 prns dilaudid, 1 prn ativan overnight.       REVIEW OF SYSTEMS     The following systems were: [] reviewed  [x] unable to be reviewed as patient is unresponsive to questions    Positive ROS include:  Constitutional: fatigue, weakness, in pain, short of breath  Ears/nose/mouth/throat: increased airway secretions  Respiratory:shortness of breath, wheezing  Gastrointestinal:poor appetite, nausea, vomiting, abdominal pain, constipation, diarrhea  Musculoskeletal:pain, deformities, swelling legs  Neurologic:confusion, hallucinations, weakness  Psychiatric:anxiety, feeling depressed, poor sleep  Endocrine:     Adult Non-Verbal Pain Assessment Score: 0    Face  [x] 0   No particular expression or smile  [] 1   Occasional grimace, tearing, frowning, wrinkled forehead  [] 2   Frequent grimace, tearing, frowning, wrinkled forehead    Activity (movement)  [x] 0   Lying quietly, normal position  [] 1   Seeking attention through movement or slow, cautious movement  [] 2   Restless, excessive activity and/or withdrawal reflexes    Guarding  [x] 0   Lying quietly, no positioning of hands over areas of body  [] 1   Splinting areas of the body, tense  [] 2   Rigid, stiff    Physiology (vital signs)  [x] 0   Stable vital signs  [] 1   Change in any of the following: SBP > 20mm Hg; HR > 20/minute  [] 2   Change in any of the following: SBP > 30mm Hg; HR > 25/minute    Respiratory  [x] 0   Baseline RR/SpO2, compliant with ventilator  [] 1   RR > 10 above baseline, or 5% drop SpO2, mild asynchrony with ventilator  [] 2   RR > 20 above baseline, or 10% drop SpO2, asynchrony with ventilator     FUNCTIONAL ASSESSMENT     Palliative Performance Scale (PPS):  10%       PSYCHOSOCIAL/SPIRITUAL ASSESSMENT     Active Problems:    Rectal cancer ([address] (5/13/2020)      Past Medical History:   Diagnosis Date    History of vascular access device 05/04/2020    [facility] VAT 5Fr Triple PICC Right brachial 43cm for TPN      Past Surgical History:   Procedure Laterality Date    FLEXIBLE SIGMOIDOSCOPY N/A 5/1/2020    SIGMOIDOSCOPY FLEXIBLE performed by Rahat Jean MD at [facility] ENDOSCOPY      Social History     Tobacco Use    Smoking status: Former Smoker    Smokeless tobacco: Never Used   Substance Use Topics    Alcohol use: Not on file     No family history on file.    No Known Allergies   Current Facility-Administered Medications   Medication Dose Route Frequency    HYDROmorphone (PF) (DILAUDID) injection 2 mg  2 mg IntraVENous Q4H    HYDROmorphone (PF) (DILAUDID) injection 2 mg  2 mg IntraVENous Q15MIN PRN    glycopyrrolate (ROBINUL) injection 0.2 mg  0.2 mg IntraVENous Q4H    bisacodyL (DULCOLAX) suppository 10 mg  10 mg Rectal DAILY PRN    acetaminophen (TYLENOL) suppository 650 mg  650 mg Rectal Q4H PRN    haloperidol lactate (HALDOL) injection 2 mg  2 mg IntraVENous Q6H PRN    LORazepam (ATIVAN) injection 0.5 mg  0.5 mg IntraVENous Q30MIN PRN    sodium chloride (NS) flush 5-10 mL  5-10 mL IntraVENous PRN        PHYSICAL EXAM     Wt Readings from Last 3 Encounters:   07/22/20 86.2 kg (190 lb)   07/06/20 86.2 kg (190 lb)   06/03/20 86.2 kg (190 lb)       Visit Vitals  BP (!) 56/39   Pulse (!) 109   Temp (!) 102.7 °F (39.3 °C)   Resp 22   SpO2 (!) 84%     Currently this patient has:  [] Peripheral IV [] PICC  [] PORT [] ICD    [x] Colmenares Catheter [] NG Tube   [] PEG Tube    [] Rectal Tube [] Drain  [x] Other: ostomy    Constitutional: lying abed not awake, elderly, frail, NAD  Eyes: eyes slightly open but does not focus or track, lids normal, no drainage  ENMT: nares normal, dryness of mm  Cardiovascular: rrr, no LE edema  Respiratory: RR normal, agonal and relaxed breaths, improved secretions  Gastrointestinal: soft, non-tender  Musculoskeletal: no deformities or TTP  Skin: warm, dry, no mottling  Neurologic: does not focus gaze or track movements, does not move extremities during exam  Psychiatric: not restless at this time, unresponsive       Pertinent Lab and or Imaging Tests:  Lab Results   Component Value Date/Time    Sodium 143 07/06/2020 10:57 AM    Potassium 3.2 (L) 07/06/2020 10:57 AM    Chloride 113 (H) 07/06/2020 10:57 AM    CO2 23 07/06/2020 10:57 AM    Anion gap 7 07/06/2020 10:57 AM    Glucose 114 (H) 07/06/2020 10:57 AM    BUN 21 (H) 07/06/2020 10:57 AM    Creatinine 1.64 (H) 07/06/2020 10:57 AM    BUN/Creatinine ratio 13 07/06/2020 10:57 AM    GFR est AA 49 (L) 07/06/2020 10:57 AM    GFR est non-AA 41 (L) 07/06/2020 10:57 AM    Calcium 8.5 07/06/2020 10:57 AM     Lab Results   Component Value Date/Time    Protein, total 6.8 07/06/2020 10:57 AM    Albumin 2.8 (L) 07/06/2020 10:57 AM           Total time:   Counseling / coordination time:   > 50% counseling / coordination?:

## 2020-08-20 NOTE — PROGRESS NOTES
responded to family request for prayer and support.  had prayer at bedside with family/friends. Let them know of chaplains continued support.  follow up as needed.      [address] Shawanda Romero, MACE   [phone])

## 2020-08-20 NOTE — HOSPICE
[facility] LCSW note; This LCSW in to meet with pts son Azael Espinoza upon pts passing. Azael Espinoza reports pts kinship son Lima Barajas and his wife were at bedside. Azael Espinoza came shortly after his father passed. Azael Espinoza grieving appropriately. LCSW provided support for him in processing fathers passing. Pts step daughter Alexi Chaudhari may be coming up, family was unsure.

## 2020-08-20 NOTE — HOSPICE
[facility] to Those in Need  [phone]    Discharge/Death Nursing Note   Patient Name: Mariangel Guevara  YOB: 1940  Age: 67 y.o.     Date of Death: 20  Admitted Date: 8/15/2020  Time of Death: 11:00 am  Facility of Care: [facility]  Level of Care: GIP  Patient Room: 603/     Hospice Attending: Walker Gatica MD  Hospice Diagnosis: Rectal cancer [facility]) Yehuda Martinez    Death Pronouncement   Pronouncement of death completed by: Pretty Grimes MD    Agency staff was present at the time of death    At the time of death the patient was documented as without pulse or respirations    The pt  within [facility]    The following were notified of the patient's death: family at bedside, attending    Medications were disposed of per facility protocol     Discharge Summary   Discharge Reason: Death    Summary of Care Provided:    [x] Post mortem care provided by unit staff  [x] Notification of  home by nursing supervisor  [] Referrals/Community resources provided:   [] Goals completed  [] Durable Medical Equipment vendor notified     Disciplines involved: [x] RN [x] SW [x]  [] DAVIS [] Vol [] PT [] OT [] ST [] [facility]    [x] IDT communication/notification    Attending Physician, Dr. Pretty Grimes, notified of death    Bereaved   Verify bereaved identified with name, address, telephone number and risk level      Advance Care Planning 2020   Patient's Healthcare Decision Maker is: Verbal statement (Legal Next of Kin remains as decision maker)   Confirm Advance Directive Yes, not on file   Patient Would Like to Complete Advance Directive Yes

## 2020-08-20 NOTE — PROGRESS NOTES
Death Pronouncement Note:    Called to examine patient who has . No response to verbal and tactile stimuli. No respiratory effort. Absent heart sounds and pulses. Pupils fixed and dilated.    Patient pronounced dead at 11:00 am.    Merlyn Diaz MD

## 2020-08-20 NOTE — HOSPICE
[facility]  [phone]    GIP Daily Nursing Note   Patient Name: Alberto Gill  YOB: 1940  Age: 67 y.o. Date of Visit: 08/20/20  Facility of Care: [facility]  Patient Room: 603/     Hospice Attending: Jaci Rodriguez MD  Hospice Diagnosis: Rectal cancer ([facility] [address] [C20]    Level of Care: GIP    Current GIP Symptoms    1. Pain   2. Secretions   3. Altered mental status  4. Dyspnea      ASSESSMENT & PLAN   1. Patient does not appear to have pain this morning, no moaning or grimacing  2. IV robinul scheduled every 4 hours, patient placed in recovery position to aid in draining secretions, large amount of secretions suctioned yesterday  3. Decreased responsiveness- patient is now unresponsive, continue to assess for non verbal signs and symptoms of pain or respiratory distress  4. Dyspnea- patient is agonal breathing, dilaudid increased late yesterday afternoon to 2mg every 4 hours  5. Patient appears imminent, extremities are cool, brief periods of apnea and agonal breathing, family updated and are on the way to hospital             Spiritual Interventions:  paged at family's request      Psych/ Social/ Emotional Interventions: Continue to support family, updated patient's step daughter that patient is declining     Care Coordination Needs: Coordinate needs with [facility] staff     Care plan and New Orders discussed / approved with Matty Romano MD.    Description History and Chart Review     Narrative History of last 24 hours that demonstrates care cannot be provided in another setting: Patient requires frequent nursing assessments to assess for non verbal signs or symptoms of pain/dyspnea       What has been done to control the patient's symptoms in the last 24 hours? Scheduled and PRN IV medications for symptom relief.     Does the patient currently require IV medications? Yes  Does the patient currently require scheduled medications?  Yes  Does the patient currently require a PCA? No      List number of doses of PRN medications in last 24 hours:  Medication 1:   Number of doses:     Medication 2:   Number of doses:     Medication 3:   Number of doses:    Supporting documentation for GIP need for pain control:  [x]? Frequent evaluation by a doctor, nurse practitioner, nurse   [x]? Frequent medication adjustment    [x]? IVs that cannot be administered at home   [x]? Aggressive pain management   []? Complicated technical delivery of medications                                                                                                                               Supporting documentation for GIP need for symptom control:  [x]? Sudden decline necessitating intensive nursing intervention  [x]? Uncontrolled / intractable nausea or vomiting or hiccups  []? Pathological fractures  []? Advanced open wounds requiring frequent skilled care  [x]? Unmanageable respiratory distress  []? New or worsening delirium   []? Delirium with behavior issues: Is 24 hour caregiver present due to safety concerns with agitation? (yes/no)  [x]? Imminent death - with skilled nursing needs documented above     DISCHARGE PLANNING      1. Discharge Plan: Pt will more than likely  at [facility] but if stabilizes, family will take pt home. 2. Patient/Family teaching: Educated family on EOL symptoms and symptom management    3. Response to patient/family teaching: receptive    ASSESSMENT    KARNOFSKY: 10     Prognosis estimated based on 20 clinical assessment is:      [x]? Hours to Days         Quality Measure:       Patient self-reports:  []? Yes    [x]?  No     Adult Non-Verbal Pain Assessment Score: 0/10     Face  [x]? 0   No particular expression or smile  []? 1   Occasional grimace, tearing, frowning, wrinkled forehead  []? 2   Frequent grimace, tearing, frowning, wrinkled forehead     Activity (movement)  [x]? 0   Lying quietly, normal position  []? 1   Seeking attention through movement or slow, cautious movement  []? 2   Restless, excessive activity and/or withdrawal reflexes     Guarding  [x]? 0   Lying quietly, no positioning of hands over areas of body  []? 1   Splinting areas of the body, tense  []? 2   Rigid, stiff     Physiology (vital signs)  [x]? 0   Stable vital signs  []? 1   Change in any of the following: SBP > 20mm Hg; HR > 20/minute  []? 2   Change in any of the following: SBP > 30mm Hg; HR > 25/minute     Respiratory  [x]? 0   Baseline RR/SpO2, compliant with ventilator  []? 1   RR > 10 above baseline, or 5% drop SpO2, mild asynchrony with ventilator  []? 2   RR > 20 above baseline, or 10% drop SpO2, asynchrony with ventilator       [x] Frequent evaluation by a doctor, nurse practitioner, nurse   [x] Frequent medication adjustment    [x] IVs that cannot be administered at home   [] Aggressive pain management   [] Complicated technical delivery of medications                  CLINICAL INFORMATION     Patient Vitals for the past 12 hrs:   Temp Pulse Resp BP   08/20/20 0802 (!) 102.7 °F (39.3 °C) (!) 109 22 (!) 56/39       Currently this patient has:  [] Supplemental O2   [x] IV    [] PICC      [] PORT   [] NG Tube    [] PEG Tube   [x] Ostomy     [] Colmenares draining _______ urine  [] Other:     SIGNS/PHYSICAL FINDINGS   Skin (including wound):  []? Warm, dry, supple, intact and color normal for race  []? Warm   [x]? Dry   [x]? Cool     []? Clammy       []? Diaphoretic    Turgor              []? Normal              [x]? Decreased  Color:              []? Pink              []? Pale              []? Cyanotic              []? Erythema              []? Jaundice              [x]? Normal for Race  []? Wounds:      Neuro:  []? Lethargy  []? Restlessness / agitation  []? Confusion / delirium  []? Hallucinations  []? Responds to maximal stimulation  [x]? Unresponsive  []? Seizures      Cardiac:  [x]? Dyspnea on Exertion  []? JVD  []? Murmur  []? Palpitations  [x]? Hypotension  []? Hypertension  [x]? Tachycardia  []? Bradycardia  []? Irregular HR  [x]? Pulses Decreased  []? Pulses Absent  []? Edema: none  []? Mottling: none     Respiratory:  Breath sounds:               [x]? Diminished              []? Wheeze              [x]? Rhonchi              []? Rales   [x]? Even and unlabored RR14  []? Labored:   []? Cough              []? Non Productive              []? Productive                          []? Description:           []? Deep suctioned   []? O2 at ___ LPM  []? High flow oxygen greater than 10 LPM  []? Bi-Pap     GI  [x]? Abdomen soft non-tender  []? Ascites  []? Nausea  []? Vomiting  [x]? Incontinent of bowels  [x]? Bowel sounds no  []? Diarrhea  []? Constipation (see above including last bowel movement)  []? Checked for impaction  [x]? Last BM 8/17/20     Nutrition  Diet:NPO  Appetite:   []? Good   []? Fair   []? Poor   []? Tube Feeding        []? Voiding  [x]? Incontinent   []? Colmenares     Musculoskeletal  []? Balance/[place] Unsteady   [x]? Weak   Strength:               []? Normal               []? Limited               [x]? Decreasing   Activities:               []? Up as tolerated              [x]? Bedridden               []? Specify:     SAFETY  []? 24 hr. Caregiver   [x]? Side rails ? [x]? Hospital bed   [x]?  Reviewed Falls & Safety        ALLERGIES AND MEDICATIONS     Allergies: No Known Allergies    Current Facility-Administered Medications   Medication Dose Route Frequency    HYDROmorphone (PF) (DILAUDID) injection 2 mg  2 mg IntraVENous Q4H    HYDROmorphone (PF) (DILAUDID) injection 2 mg  2 mg IntraVENous Q15MIN PRN    glycopyrrolate (ROBINUL) injection 0.2 mg  0.2 mg IntraVENous Q4H    bisacodyL (DULCOLAX) suppository 10 mg  10 mg Rectal DAILY PRN    acetaminophen (TYLENOL) suppository 650 mg  650 mg Rectal Q4H PRN    haloperidol lactate (HALDOL) injection 2 mg  2 mg IntraVENous Q6H PRN    LORazepam (ATIVAN) injection 0.5 mg  0.5 mg IntraVENous Q30MIN PRN    sodium chloride (NS) flush 5-10 mL  5-10 mL IntraVENous PRN          Visit Time In: 07:50  Visit Time Out: 08:20

## 2020-08-20 NOTE — DISCHARGE SUMMARY
Discharge Summary    [facility] [facility]  [facility]  [phone]      Date of Admission: 8/15/2020  Date of Discharge:  08/20/20    Demetris Espinoza is a 67y.o. year old who was admitted to [facility] at [facility] with a Hospice diagnosis of Rectal cancer ([facility] [address] Jessica Kaba. Pt was admitted for [facility] level care. Per HPI:  Nellie Leninana Matias is a 66 y/o male admitted to [facility] on 8/15/2020 for respite care, today is transitioning to [facility]. He has a history of HTN, CKD3, dementia, rectal cancer s/p diverting colostomy 5/5/2020, severe pro-kaleb malnutrition. At home, he has declined, is bed ridden, unable to eat or drink anything.      Functionally, the patient's Karnofsky and/or Palliative Performance Scale has declined over a period of weeks and is estimated at 10. The patient is dependent on the following ADLs: all ADLs. \"    The patient's care was focused on comfort, and the patient passed away on  08/20/20.     Celso Reddy MD
